# Patient Record
Sex: FEMALE | Race: WHITE | Employment: OTHER | URBAN - METROPOLITAN AREA
[De-identification: names, ages, dates, MRNs, and addresses within clinical notes are randomized per-mention and may not be internally consistent; named-entity substitution may affect disease eponyms.]

---

## 2017-08-15 ENCOUNTER — HOSPITAL ENCOUNTER (OUTPATIENT)
Dept: LAB | Age: 67
Discharge: HOME | End: 2017-08-15

## 2017-08-15 PROCEDURE — 88305 TISSUE EXAM BY PATHOLOGIST: CPT | Performed by: INTERNAL MEDICINE

## 2017-08-16 PROBLEM — K63.5 COLON POLYP: Status: ACTIVE | Noted: 2017-08-16

## 2017-08-16 PROBLEM — Z78.0 POSTMENOPAUSAL: Status: ACTIVE | Noted: 2017-08-16

## 2017-08-16 PROBLEM — K57.30 DIVERTICULOSIS OF LARGE INTESTINE: Status: ACTIVE | Noted: 2017-08-16

## 2018-08-21 PROBLEM — R01.1 SYSTOLIC MURMUR: Status: ACTIVE | Noted: 2018-08-21

## 2018-08-21 PROBLEM — D64.9 ANEMIA: Status: ACTIVE | Noted: 2018-08-21

## 2019-08-18 ENCOUNTER — HOSPITAL ENCOUNTER (OUTPATIENT)
Age: 69
Setting detail: OBSERVATION
Discharge: HOME | End: 2019-08-21
Attending: EMERGENCY MEDICINE | Admitting: INTERNAL MEDICINE
Payer: MEDICARE

## 2019-08-18 ENCOUNTER — APPOINTMENT (OUTPATIENT)
Dept: GENERAL RADIOLOGY | Age: 69
End: 2019-08-18
Attending: EMERGENCY MEDICINE
Payer: MEDICARE

## 2019-08-18 DIAGNOSIS — R06.00 DYSPNEA, UNSPECIFIED TYPE: ICD-10-CM

## 2019-08-18 DIAGNOSIS — R07.9 CHEST PAIN, UNSPECIFIED TYPE: Primary | ICD-10-CM

## 2019-08-18 LAB
ALBUMIN SERPL-MCNC: 3.4 G/DL (ref 3.2–4.6)
ALBUMIN/GLOB SERPL: 0.8 {RATIO} (ref 1.2–3.5)
ALP SERPL-CCNC: 120 U/L (ref 50–136)
ALT SERPL-CCNC: 15 U/L (ref 12–65)
ANION GAP SERPL CALC-SCNC: 8 MMOL/L (ref 7–16)
AST SERPL-CCNC: 8 U/L (ref 15–37)
BASOPHILS # BLD: 0.1 K/UL (ref 0–0.2)
BASOPHILS NFR BLD: 1 % (ref 0–2)
BILIRUB SERPL-MCNC: 0.4 MG/DL (ref 0.2–1.1)
BUN SERPL-MCNC: 15 MG/DL (ref 8–23)
CALCIUM SERPL-MCNC: 8.9 MG/DL (ref 8.3–10.4)
CHLORIDE SERPL-SCNC: 99 MMOL/L (ref 98–107)
CO2 SERPL-SCNC: 30 MMOL/L (ref 21–32)
CREAT SERPL-MCNC: 0.52 MG/DL (ref 0.6–1)
D DIMER PPP FEU-MCNC: 0.33 UG/ML(FEU)
DIFFERENTIAL METHOD BLD: ABNORMAL
EOSINOPHIL # BLD: 0.3 K/UL (ref 0–0.8)
EOSINOPHIL NFR BLD: 3 % (ref 0.5–7.8)
ERYTHROCYTE [DISTWIDTH] IN BLOOD BY AUTOMATED COUNT: 13.1 % (ref 11.9–14.6)
GLOBULIN SER CALC-MCNC: 4.2 G/DL (ref 2.3–3.5)
GLUCOSE SERPL-MCNC: 135 MG/DL (ref 65–100)
HCT VFR BLD AUTO: 34 % (ref 35.8–46.3)
HGB BLD-MCNC: 10.8 G/DL (ref 11.7–15.4)
IMM GRANULOCYTES # BLD AUTO: 0.1 K/UL (ref 0–0.5)
IMM GRANULOCYTES NFR BLD AUTO: 1 % (ref 0–5)
LYMPHOCYTES # BLD: 2 K/UL (ref 0.5–4.6)
LYMPHOCYTES NFR BLD: 21 % (ref 13–44)
MCH RBC QN AUTO: 29.4 PG (ref 26.1–32.9)
MCHC RBC AUTO-ENTMCNC: 31.8 G/DL (ref 31.4–35)
MCV RBC AUTO: 92.6 FL (ref 79.6–97.8)
MONOCYTES # BLD: 1.1 K/UL (ref 0.1–1.3)
MONOCYTES NFR BLD: 11 % (ref 4–12)
NEUTS SEG # BLD: 6.1 K/UL (ref 1.7–8.2)
NEUTS SEG NFR BLD: 64 % (ref 43–78)
NRBC # BLD: 0 K/UL (ref 0–0.2)
PLATELET # BLD AUTO: 339 K/UL (ref 150–450)
PMV BLD AUTO: 8.8 FL (ref 9.4–12.3)
POTASSIUM SERPL-SCNC: 3.9 MMOL/L (ref 3.5–5.1)
PROT SERPL-MCNC: 7.6 G/DL (ref 6.3–8.2)
RBC # BLD AUTO: 3.67 M/UL (ref 4.05–5.2)
SODIUM SERPL-SCNC: 137 MMOL/L (ref 136–145)
TROPONIN I SERPL-MCNC: <0.02 NG/ML (ref 0.02–0.05)
WBC # BLD AUTO: 9.5 K/UL (ref 4.3–11.1)

## 2019-08-18 PROCEDURE — 99285 EMERGENCY DEPT VISIT HI MDM: CPT | Performed by: EMERGENCY MEDICINE

## 2019-08-18 PROCEDURE — 93005 ELECTROCARDIOGRAM TRACING: CPT | Performed by: EMERGENCY MEDICINE

## 2019-08-18 PROCEDURE — 85379 FIBRIN DEGRADATION QUANT: CPT

## 2019-08-18 PROCEDURE — 85025 COMPLETE CBC W/AUTO DIFF WBC: CPT

## 2019-08-18 PROCEDURE — 80053 COMPREHEN METABOLIC PANEL: CPT

## 2019-08-18 PROCEDURE — 84484 ASSAY OF TROPONIN QUANT: CPT

## 2019-08-18 PROCEDURE — 71046 X-RAY EXAM CHEST 2 VIEWS: CPT

## 2019-08-19 ENCOUNTER — APPOINTMENT (OUTPATIENT)
Dept: ULTRASOUND IMAGING | Age: 69
End: 2019-08-19
Attending: NURSE PRACTITIONER
Payer: MEDICARE

## 2019-08-19 PROBLEM — R01.1 SYSTOLIC MURMUR: Chronic | Status: ACTIVE | Noted: 2018-08-21

## 2019-08-19 PROBLEM — R07.9 CHEST PAIN: Status: ACTIVE | Noted: 2019-08-19

## 2019-08-19 PROBLEM — D64.9 ANEMIA: Chronic | Status: ACTIVE | Noted: 2018-08-21

## 2019-08-19 PROBLEM — R53.83 FATIGUE: Status: ACTIVE | Noted: 2019-08-19

## 2019-08-19 LAB
ANION GAP SERPL CALC-SCNC: 6 MMOL/L (ref 7–16)
ATRIAL RATE: 83 BPM
BUN SERPL-MCNC: 13 MG/DL (ref 8–23)
CALCIUM SERPL-MCNC: 8.7 MG/DL (ref 8.3–10.4)
CALCULATED P AXIS, ECG09: 47 DEGREES
CALCULATED R AXIS, ECG10: -5 DEGREES
CALCULATED T AXIS, ECG11: 42 DEGREES
CHLORIDE SERPL-SCNC: 104 MMOL/L (ref 98–107)
CO2 SERPL-SCNC: 29 MMOL/L (ref 21–32)
CREAT SERPL-MCNC: 0.4 MG/DL (ref 0.6–1)
DIAGNOSIS, 93000: NORMAL
ERYTHROCYTE [DISTWIDTH] IN BLOOD BY AUTOMATED COUNT: 13.2 % (ref 11.9–14.6)
GLUCOSE SERPL-MCNC: 98 MG/DL (ref 65–100)
HCT VFR BLD AUTO: 29.7 % (ref 35.8–46.3)
HGB BLD-MCNC: 9.4 G/DL (ref 11.7–15.4)
MAGNESIUM SERPL-MCNC: 2.3 MG/DL (ref 1.8–2.4)
MCH RBC QN AUTO: 29.5 PG (ref 26.1–32.9)
MCHC RBC AUTO-ENTMCNC: 31.6 G/DL (ref 31.4–35)
MCV RBC AUTO: 93.1 FL (ref 79.6–97.8)
NRBC # BLD: 0 K/UL (ref 0–0.2)
P-R INTERVAL, ECG05: 154 MS
PLATELET # BLD AUTO: 282 K/UL (ref 150–450)
PMV BLD AUTO: 8.8 FL (ref 9.4–12.3)
POTASSIUM SERPL-SCNC: 3.8 MMOL/L (ref 3.5–5.1)
Q-T INTERVAL, ECG07: 400 MS
QRS DURATION, ECG06: 98 MS
QTC CALCULATION (BEZET), ECG08: 470 MS
RBC # BLD AUTO: 3.19 M/UL (ref 4.05–5.2)
SODIUM SERPL-SCNC: 139 MMOL/L (ref 136–145)
TROPONIN I SERPL-MCNC: <0.02 NG/ML (ref 0.02–0.05)
TROPONIN I SERPL-MCNC: <0.02 NG/ML (ref 0.02–0.05)
TSH SERPL DL<=0.005 MIU/L-ACNC: 3.43 UIU/ML (ref 0.36–3.74)
VENTRICULAR RATE, ECG03: 83 BPM
WBC # BLD AUTO: 6.6 K/UL (ref 4.3–11.1)

## 2019-08-19 PROCEDURE — 36415 COLL VENOUS BLD VENIPUNCTURE: CPT

## 2019-08-19 PROCEDURE — 93454 CORONARY ARTERY ANGIO S&I: CPT

## 2019-08-19 PROCEDURE — 74011250636 HC RX REV CODE- 250/636: Performed by: INTERNAL MEDICINE

## 2019-08-19 PROCEDURE — 93880 EXTRACRANIAL BILAT STUDY: CPT

## 2019-08-19 PROCEDURE — 80048 BASIC METABOLIC PNL TOTAL CA: CPT

## 2019-08-19 PROCEDURE — 74011636320 HC RX REV CODE- 636/320: Performed by: INTERNAL MEDICINE

## 2019-08-19 PROCEDURE — 93306 TTE W/DOPPLER COMPLETE: CPT

## 2019-08-19 PROCEDURE — 74011000250 HC RX REV CODE- 250: Performed by: INTERNAL MEDICINE

## 2019-08-19 PROCEDURE — 84443 ASSAY THYROID STIM HORMONE: CPT

## 2019-08-19 PROCEDURE — 94760 N-INVAS EAR/PLS OXIMETRY 1: CPT

## 2019-08-19 PROCEDURE — 99152 MOD SED SAME PHYS/QHP 5/>YRS: CPT

## 2019-08-19 PROCEDURE — 84484 ASSAY OF TROPONIN QUANT: CPT

## 2019-08-19 PROCEDURE — 74011250637 HC RX REV CODE- 250/637: Performed by: NURSE PRACTITIONER

## 2019-08-19 PROCEDURE — 99218 HC RM OBSERVATION: CPT

## 2019-08-19 PROCEDURE — 83735 ASSAY OF MAGNESIUM: CPT

## 2019-08-19 PROCEDURE — 74011250636 HC RX REV CODE- 250/636

## 2019-08-19 PROCEDURE — 85027 COMPLETE CBC AUTOMATED: CPT

## 2019-08-19 RX ORDER — CLOPIDOGREL BISULFATE 75 MG/1
75 TABLET ORAL DAILY
Status: DISCONTINUED | OUTPATIENT
Start: 2019-08-19 | End: 2019-08-19

## 2019-08-19 RX ORDER — FLUTICASONE PROPIONATE 50 MCG
2 SPRAY, SUSPENSION (ML) NASAL DAILY
Status: DISCONTINUED | OUTPATIENT
Start: 2019-08-19 | End: 2019-08-21 | Stop reason: HOSPADM

## 2019-08-19 RX ORDER — CITALOPRAM 20 MG/1
20 TABLET, FILM COATED ORAL DAILY
Status: DISCONTINUED | OUTPATIENT
Start: 2019-08-19 | End: 2019-08-20

## 2019-08-19 RX ORDER — HEPARIN SODIUM 200 [USP'U]/100ML
3 INJECTION, SOLUTION INTRAVENOUS CONTINUOUS
Status: DISCONTINUED | OUTPATIENT
Start: 2019-08-19 | End: 2019-08-20

## 2019-08-19 RX ORDER — METOPROLOL TARTRATE 25 MG/1
25 TABLET, FILM COATED ORAL 2 TIMES DAILY
Status: DISCONTINUED | OUTPATIENT
Start: 2019-08-19 | End: 2019-08-21 | Stop reason: HOSPADM

## 2019-08-19 RX ORDER — ASPIRIN 81 MG/1
81 TABLET ORAL DAILY
Status: DISCONTINUED | OUTPATIENT
Start: 2019-08-19 | End: 2019-08-21 | Stop reason: HOSPADM

## 2019-08-19 RX ORDER — PRAVASTATIN SODIUM 20 MG/1
40 TABLET ORAL
Status: DISCONTINUED | OUTPATIENT
Start: 2019-08-19 | End: 2019-08-19 | Stop reason: SDUPTHER

## 2019-08-19 RX ORDER — LIDOCAINE HYDROCHLORIDE 10 MG/ML
10-200 INJECTION INFILTRATION; PERINEURAL ONCE
Status: COMPLETED | OUTPATIENT
Start: 2019-08-19 | End: 2019-08-19

## 2019-08-19 RX ORDER — SODIUM CHLORIDE 0.9 % (FLUSH) 0.9 %
5-40 SYRINGE (ML) INJECTION EVERY 8 HOURS
Status: DISCONTINUED | OUTPATIENT
Start: 2019-08-19 | End: 2019-08-21 | Stop reason: HOSPADM

## 2019-08-19 RX ORDER — LANOLIN ALCOHOL/MO/W.PET/CERES
1 CREAM (GRAM) TOPICAL
Status: DISCONTINUED | OUTPATIENT
Start: 2019-08-19 | End: 2019-08-21 | Stop reason: HOSPADM

## 2019-08-19 RX ORDER — NITROGLYCERIN 0.4 MG/1
0.4 TABLET SUBLINGUAL
Status: DISCONTINUED | OUTPATIENT
Start: 2019-08-19 | End: 2019-08-21 | Stop reason: HOSPADM

## 2019-08-19 RX ORDER — AMLODIPINE BESYLATE 5 MG/1
5 TABLET ORAL DAILY
Status: DISCONTINUED | OUTPATIENT
Start: 2019-08-19 | End: 2019-08-21

## 2019-08-19 RX ORDER — MORPHINE SULFATE 2 MG/ML
2 INJECTION, SOLUTION INTRAMUSCULAR; INTRAVENOUS
Status: DISCONTINUED | OUTPATIENT
Start: 2019-08-19 | End: 2019-08-21 | Stop reason: HOSPADM

## 2019-08-19 RX ORDER — FENTANYL CITRATE 50 UG/ML
25-50 INJECTION, SOLUTION INTRAMUSCULAR; INTRAVENOUS
Status: DISCONTINUED | OUTPATIENT
Start: 2019-08-19 | End: 2019-08-21 | Stop reason: HOSPADM

## 2019-08-19 RX ORDER — ACETAMINOPHEN 325 MG/1
650 TABLET ORAL
Status: DISCONTINUED | OUTPATIENT
Start: 2019-08-19 | End: 2019-08-21 | Stop reason: HOSPADM

## 2019-08-19 RX ORDER — MAG HYDROX/ALUMINUM HYD/SIMETH 200-200-20
30 SUSPENSION, ORAL (FINAL DOSE FORM) ORAL
Status: DISCONTINUED | OUTPATIENT
Start: 2019-08-19 | End: 2019-08-21 | Stop reason: HOSPADM

## 2019-08-19 RX ORDER — ONDANSETRON 2 MG/ML
4 INJECTION INTRAMUSCULAR; INTRAVENOUS
Status: DISCONTINUED | OUTPATIENT
Start: 2019-08-19 | End: 2019-08-21 | Stop reason: HOSPADM

## 2019-08-19 RX ORDER — SODIUM CHLORIDE 0.9 % (FLUSH) 0.9 %
5-40 SYRINGE (ML) INJECTION AS NEEDED
Status: DISCONTINUED | OUTPATIENT
Start: 2019-08-19 | End: 2019-08-21 | Stop reason: HOSPADM

## 2019-08-19 RX ORDER — PRAVASTATIN SODIUM 40 MG/1
40 TABLET ORAL
Status: DISCONTINUED | OUTPATIENT
Start: 2019-08-19 | End: 2019-08-21 | Stop reason: HOSPADM

## 2019-08-19 RX ORDER — NALOXONE HYDROCHLORIDE 0.4 MG/ML
0.4 INJECTION, SOLUTION INTRAMUSCULAR; INTRAVENOUS; SUBCUTANEOUS AS NEEDED
Status: DISCONTINUED | OUTPATIENT
Start: 2019-08-19 | End: 2019-08-21 | Stop reason: HOSPADM

## 2019-08-19 RX ORDER — MIDAZOLAM HYDROCHLORIDE 1 MG/ML
.5-2 INJECTION, SOLUTION INTRAMUSCULAR; INTRAVENOUS
Status: DISCONTINUED | OUTPATIENT
Start: 2019-08-19 | End: 2019-08-20

## 2019-08-19 RX ORDER — LISINOPRIL 5 MG/1
10 TABLET ORAL DAILY
Status: DISCONTINUED | OUTPATIENT
Start: 2019-08-19 | End: 2019-08-21 | Stop reason: HOSPADM

## 2019-08-19 RX ADMIN — IOPAMIDOL 40 ML: 755 INJECTION, SOLUTION INTRAVENOUS at 19:04

## 2019-08-19 RX ADMIN — METOPROLOL TARTRATE 25 MG: 25 TABLET ORAL at 17:20

## 2019-08-19 RX ADMIN — CLOPIDOGREL BISULFATE 75 MG: 75 TABLET ORAL at 09:04

## 2019-08-19 RX ADMIN — MIDAZOLAM HYDROCHLORIDE 2 MG: 1 INJECTION, SOLUTION INTRAMUSCULAR; INTRAVENOUS at 18:51

## 2019-08-19 RX ADMIN — HEPARIN SODIUM 3 ML/HR: 5000 INJECTION, SOLUTION INTRAVENOUS; SUBCUTANEOUS at 18:43

## 2019-08-19 RX ADMIN — METOPROLOL TARTRATE 25 MG: 25 TABLET ORAL at 09:04

## 2019-08-19 RX ADMIN — Medication 10 ML: at 01:12

## 2019-08-19 RX ADMIN — CITALOPRAM HYDROBROMIDE 20 MG: 20 TABLET ORAL at 09:19

## 2019-08-19 RX ADMIN — FENTANYL CITRATE 50 MCG: 50 INJECTION, SOLUTION INTRAMUSCULAR; INTRAVENOUS at 18:51

## 2019-08-19 RX ADMIN — LISINOPRIL 10 MG: 5 TABLET ORAL at 09:04

## 2019-08-19 RX ADMIN — PRAVASTATIN SODIUM 40 MG: 40 TABLET ORAL at 22:39

## 2019-08-19 RX ADMIN — Medication 5 ML: at 09:07

## 2019-08-19 RX ADMIN — Medication 10 ML: at 22:39

## 2019-08-19 RX ADMIN — NITROGLYCERIN 1 INCH: 20 OINTMENT TOPICAL at 01:12

## 2019-08-19 RX ADMIN — FLUTICASONE PROPIONATE 2 SPRAY: 50 SPRAY, METERED NASAL at 09:03

## 2019-08-19 RX ADMIN — HEPARIN SODIUM 2 ML: 10000 INJECTION INTRAVENOUS; SUBCUTANEOUS at 18:51

## 2019-08-19 RX ADMIN — LIDOCAINE HYDROCHLORIDE 2 ML: 10 INJECTION, SOLUTION INFILTRATION; PERINEURAL at 18:51

## 2019-08-19 RX ADMIN — AMLODIPINE BESYLATE 5 MG: 5 TABLET ORAL at 09:04

## 2019-08-19 RX ADMIN — ASPIRIN 81 MG: 81 TABLET ORAL at 09:04

## 2019-08-19 RX ADMIN — Medication 10 ML: at 06:42

## 2019-08-19 NOTE — PROGRESS NOTES
Initial visit to assess pt's spiritual needs. Feeling today? Nervous, anxious  Pt is waiting for a procedure in cath lab    Receiving good care?  yes    Needs from Spiritual Care:  prayer    Ministry of presence & prayer to demonstrate caring & concern, convey emotional & spiritual support.     jules Santos MDiv,[facility],PhD

## 2019-08-19 NOTE — PROGRESS NOTES
Bedside and Verbal shift change report given to Britton Sanchez RN (oncoming nurse) by self Milvia mike).  Report included the following information SBAR, Kardex, Intake/Output, MAR, Recent Results and Cardiac Rhythm Sr.

## 2019-08-19 NOTE — ED TRIAGE NOTES
Pt arrives from home with  via POV with c/o chest pain x 4 days. Pt has hx of stent in 2014. Pt states pressure can get to a level 4/10 on exertion, lessens to 0/10 with rest. Pt states she has been \"run down\" lately. Pain is intermittent, no pain in triage, but has been getting worse over time. Pt takes BP medication, states compliant with regimen, and has not taken her nitro with this pain. Diogo NVD or diaphoresis.

## 2019-08-19 NOTE — PROGRESS NOTES
Verbal bedside report given to Danna fields onclinda RN. Patient's situation, background, assessment and recommendations provided. Kardex, Mar, and recent results also reviewed. Opportunity for questions provided. Oncoming RN assumed care of patient.      Pt in the cath lab

## 2019-08-19 NOTE — PROGRESS NOTES
Pt admitted at Observation status. Pt instructed on home medication policy; pt voices understanding. Pt provided copies of the following: Admission pamphlet with Observation insert and Medicare FAQ's. Home meds ordered per MD, verified with [facility] and supplied by patient. No narcotic meds. Medications verified and labeled per pharmacy and placed in locked box in patient's room. The medications received from the patient include plavix, asa, norvasc, celexa, metoprolol, pravastatin,. The following medications were not ordered include lisinopril hctz (placed in box).

## 2019-08-19 NOTE — PROGRESS NOTES
TRANSFER - IN REPORT:    Verbal report received from Alesha Elena RN(name) on Classie Orf  being received from ER(unit) for routine progression of care      Report consisted of patients Situation, Background, Assessment and   Recommendations(SBAR). Information from the following report(s) SBAR, Kardex, ED Summary, Intake/Output, MAR, Recent Results and Cardiac Rhythm SR was reviewed with the receiving nurse. Opportunity for questions and clarification was provided. Assessment completed upon patients arrival to unit and care assumed. Dual skin assessment completed on admission. Sacrum visualized with no issues. Heels are intact. No skin issues noted. Patient reports \"tan won't come off legs. It is a sun tan. \"

## 2019-08-19 NOTE — PROGRESS NOTES
TRANSFER - OUT REPORT:    Verbal report given to RN on Emily Saldana  being transferred to [facility] for routine progression of care       Report consisted of patients Situation, Background, Assessment and Recommendations(SBAR). Information from the following report(s) SBAR, Kardex, Procedure Summary and MAR was reviewed with the receiving nurse. Opportunity for questions and clarification was provided.       [facility] with Dr Jack  No intervention  2 versed  50 fentanyl  Right radial Rband 14ml at [facility]

## 2019-08-19 NOTE — ED NOTES
Patient report to Justina Lezama [facility]. Patient care to be assumed at this time.

## 2019-08-19 NOTE — ED NOTES
TRANSFER - OUT REPORT:    Verbal report given to [facility] on Maki Eng  being transferred to [facility] Telemetry(unit) for routine progression of care       Report consisted of patients Situation, Background, Assessment and   Recommendations(SBAR). Information from the following report(s) SBAR, Kardex, ED Summary, MAR, Recent Results and Cardiac Rhythm NSR was reviewed with the receiving nurse. Lines:   Peripheral IV 08/18/19 Left Antecubital (Active)   Site Assessment Clean, dry, & intact 8/18/2019  8:50 PM   Phlebitis Assessment 0 8/18/2019  8:50 PM   Infiltration Assessment 0 8/18/2019  8:50 PM   Dressing Status Clean, dry, & intact 8/18/2019  8:50 PM   Dressing Type Transparent 8/18/2019  8:50 PM   Hub Color/Line Status Green 8/18/2019  8:50 PM        Opportunity for questions and clarification was provided.       Patient transported with:   Monitor  Registered Nurse

## 2019-08-19 NOTE — PROGRESS NOTES
TRANSFER - IN REPORT:    Verbal report received from ARABELLA Bar on Roque Servin being received from [address] [place] [address] for routine post - op      Report consisted of patients Situation, Background, Assessment and Recommendations(SBAR). Information from the following report(s) Procedure Summary was reviewed with the receiving nurse. Opportunity for questions and clarification was provided.

## 2019-08-19 NOTE — PROGRESS NOTES
Care Management Interventions  PCP Verified by CM: Yes  Last Visit to PCP: 02/26/19  Mode of Transport at Discharge: Other (see comment)(Medhat Tejeda Spouse [phone] )  Transition of Care Consult (CM Consult): Discharge Planning  Discharge Durable Medical Equipment: No  Physical Therapy Consult: No  Occupational Therapy Consult: No  Speech Therapy Consult: No  Current Support Network: Lives with Spouse  Confirm Follow Up Transport: Family  Plan discussed with Pt/Family/Caregiver: Yes  Freedom of Choice Offered: Yes  Discharge Location  Discharge Placement: Home      Pt admitted to 3rd floor [facility] for CP . CM met with pt to discuss CM needs & DCP. Pt is A&Ox4. Pt is indep at home with all ADLS. Pt lives with spouse. Pt has no DME needs. Pt has no difficulty with obtaining medications or transport. DCP home with spouse. No further needs noted.

## 2019-08-19 NOTE — PROGRESS NOTES
Verbal bedside report received from [address]. Patient's situation, background, assessment and recommendations were provided. Kardex, Mar, and recent results also reviewed. Opportunity for questions provided. Assumed care of patient.

## 2019-08-19 NOTE — ED PROVIDER NOTES
55-year-old lady presents with concerns about pain in her chest that has gradually gotten worse over the past couple weeks. With it is progressive dyspnea on exertion. Patient says that she has had a history of a previous cardiac stent. She denies any history of pulmonary embolism or congestive heart failure. She said she does know she has a loud murmur. She is due to follow-up with cardiology this coming week for further evaluation of her symptoms. She notes that her pain gets worse with exertion and she said even going up a small incline gives her pain and dyspnea. She denies any specific fevers or chills. Elements of this note were created using speech recognition software. As such, errors of speech recognition may be present.            Past Medical History:   Diagnosis Date    Abnormal weight gain     Acute bronchitis     Acute, but ill-defined, cerebrovascular disease 4/20/2015    Adjustment disorder with depressed mood     Allergic rhinitis, cause unspecified 4/21/2015    Asthma     Coronary atherosclerosis of unspecified type of vessel, native or graft 4/20/2015    Coronary atherosclerosis of unspecified type of vessel, native or graft     Coronary atherosclerosis of unspecified type of vessel, native or graft     Depressive disorder, not elsewhere classified 4/20/2015    Disorder of bone and cartilage, unspecified 4/21/2015    Encounter for long-term (current) use of other medications     Herpes zoster without mention of complication     Hypertension     Mixed incontinence urge and stress (male)(female) 4/21/2015    Other and unspecified hyperlipidemia     Overweight(278.02)     Pleurisy without mention of effusion or current tuberculosis     Premature menopause     Psychiatric disorder     MENOPAUSE    Rheumatoid arthritis(714.0)     Stroke ([facility] [address]     AT AGE 35    Unspecified asthma(493.90) 4/21/2015    Unspecified disorder of skin and subcutaneous tissue     Unspecified menopausal and postmenopausal disorder        Past Surgical History:   Procedure Laterality Date    ABDOMEN SURGERY PROC UNLISTED      GALLBLADDER    HX CHOLECYSTECTOMY  1988    HX CORONARY STENT PLACEMENT      HX GYN  1998    HYSTERECTOMY    HX MOHS PROCEDURES  2005    HX ORTHOPAEDIC      SHOULDER    HX UROLOGICAL  2008    BLADDER TACK    VASCULAR SURGERY PROCEDURE UNLIST      VEIN IN R LEG         Family History:   Problem Relation Age of Onset    Diabetes Mother     Glaucoma Mother     Heart Disease Mother     Alcohol abuse Father     Cancer Sister         pancreatic cancer    Diabetes Sister     Hypertension Sister     Heart Disease Sister     Diabetes Maternal Grandmother     Diabetes Paternal Grandmother        Social History     Socioeconomic History    Marital status:      Spouse name: Not on file    Number of children: Not on file    Years of education: Not on file    Highest education level: Not on file   Occupational History    Not on file   Social Needs    Financial resource strain: Not on file    Food insecurity:     Worry: Not on file     Inability: Not on file    Transportation needs:     Medical: Not on file     Non-medical: Not on file   Tobacco Use    Smoking status: Former Smoker     Last attempt to quit: 2001     Years since quittin.7    Smokeless tobacco: Never Used   Substance and Sexual Activity    Alcohol use:  Yes     Alcohol/week: 3.3 standard drinks     Types: 3 Cans of beer, 1 Standard drinks or equivalent per week     Comment: occasionally    Drug use: No    Sexual activity: Not on file   Lifestyle    Physical activity:     Days per week: Not on file     Minutes per session: Not on file    Stress: Not on file   Relationships    Social connections:     Talks on phone: Not on file     Gets together: Not on file     Attends Hoahaoism service: Not on file     Active member of club or organization: Not on file     Attends meetings of clubs or organizations: Not on file     Relationship status: Not on file    Intimate partner violence:     Fear of current or ex partner: Not on file     Emotionally abused: Not on file     Physically abused: Not on file     Forced sexual activity: Not on file   Other Topics Concern    Not on file   Social History Narrative    Not on file         ALLERGIES: Other medication and Sulfa (sulfonamide antibiotics)    Review of Systems   Constitutional: Negative for chills, diaphoresis and fever. HENT: Negative for congestion, rhinorrhea and sore throat. Eyes: Negative for redness and visual disturbance. Respiratory: Positive for chest tightness and shortness of breath. Negative for cough and wheezing. Cardiovascular: Negative for chest pain and palpitations. Gastrointestinal: Negative for abdominal pain, blood in stool, diarrhea, nausea and vomiting. Endocrine: Negative for polydipsia and polyuria. Genitourinary: Negative for dysuria and hematuria. Musculoskeletal: Negative for arthralgias, myalgias and neck stiffness. Skin: Negative for rash. Allergic/Immunologic: Negative for environmental allergies and food allergies. Neurological: Negative for dizziness, weakness and headaches. Hematological: Negative for adenopathy. Does not bruise/bleed easily. Psychiatric/Behavioral: Negative for confusion and sleep disturbance. The patient is not nervous/anxious. Vitals:    08/18/19 2102 08/18/19 2231 08/18/19 2234 08/18/19 2234   BP: 110/55  109/56    Pulse: 73  71    Resp: 20      Temp:       SpO2: 93% 93% 93% 96%   Weight:       Height:                Physical Exam   Constitutional: She is oriented to person, place, and time. She appears well-developed and well-nourished. HENT:   Head: Normocephalic and atraumatic. Eyes: Pupils are equal, round, and reactive to light. Right eye exhibits no discharge. Left eye exhibits no discharge. Cardiovascular: Normal rate and regular rhythm. 3 out of 6 holosystolic murmur   Pulmonary/Chest: Effort normal and breath sounds normal.   Abdominal: Soft. Bowel sounds are normal.   Musculoskeletal: Normal range of motion. She exhibits no deformity. Neurological: She is alert and oriented to person, place, and time. Nursing note and vitals reviewed. MDM  Number of Diagnoses or Management Options  Diagnosis management comments: Given her symptoms I will check a d-dimer to screen for potential pulmonary embolism. Her initial troponin and EKG are unremarkable. D-dimer is negative. I will discuss the case with on-call for cardiology. 11:27 PM  Given her exertional chest pain symptoms and her dyspnea with a negative d-dimer, I discussed the case with on-call for cardiology who kindly agreed to have his service see the patient.          Procedures

## 2019-08-19 NOTE — H&P
[facility] CARDIOLOGY History &Physical                 Primary Cardiologist: Dr. Ashwini Ball    Primary Care Physician: Dr. Graciela Holt    Admitting Physician: Dr. Latrice Holbrook:     Patient is a 76 y.o.  female with PMHx of CAD (s/p PCI mLAD x1, 2009), HTN, HLD, AS, Anemia, Lymphoma (no tx), Asthma and Depression who presented to the ED tonight with c/o CP. Has been having intermittent episodes of mid chest pressure for a couple months. Seems to be occurring more frequently now. States occurs with exertion and resolves with rest. Reports has noted having sweats with the episodes. Denies associated HA, dizziness, palpitations, syncope, N/V, edema.  has also had more SNOWDEN and fatigue. Even walking a very short distance causes her to have significant dyspnea. States she just feels \"weak. \" Reports taking all meds as directed. No recent illness, F/C/S.  had f/u with Oncology re lymphoma 5/2019 and not treatment needed as her counts are good and she is asymptomatic.  has not had LHC since stent placed in 2009. Has an ECHO pending in the office but not done yet. [facility] has appt next week to see Dr. Mehreen Yeung.       Past Medical History:   Diagnosis Date    Abnormal weight gain     Acute bronchitis     Acute, but ill-defined, cerebrovascular disease 4/20/2015    Adjustment disorder with depressed mood     Allergic rhinitis, cause unspecified 4/21/2015    Asthma     Coronary atherosclerosis of unspecified type of vessel, native or graft 4/20/2015    Coronary atherosclerosis of unspecified type of vessel, native or graft     Coronary atherosclerosis of unspecified type of vessel, native or graft     Depressive disorder, not elsewhere classified 4/20/2015    Disorder of bone and cartilage, unspecified 4/21/2015    Encounter for long-term (current) use of other medications     Herpes zoster without mention of complication     Hypertension     Mixed incontinence urge and stress (male)(female) 2015    Other and unspecified hyperlipidemia     Overweight(278.02)     Pleurisy without mention of effusion or current tuberculosis     Premature menopause     Psychiatric disorder     MENOPAUSE    Rheumatoid arthritis(714.0)     Stroke ([facility] [address]     AT AGE 35    Unspecified asthma(493.90) 2015    Unspecified disorder of skin and subcutaneous tissue     Unspecified menopausal and postmenopausal disorder       Past Surgical History:   Procedure Laterality Date    ABDOMEN SURGERY PROC UNLISTED      GALLBLADDER    HX CHOLECYSTECTOMY      HX CORONARY STENT PLACEMENT      HX GYN  1998    HYSTERECTOMY    HX MOHS PROCEDURES      HX ORTHOPAEDIC      SHOULDER    HX UROLOGICAL  2008    BLADDER TACK    VASCULAR SURGERY PROCEDURE UNLIST      VEIN IN R LEG      Allergies   Allergen Reactions    Other Medication Rash     TAPE,  USE PAPER TAPE    Sulfa (Sulfonamide Antibiotics) Nausea Only     Social History     Tobacco Use    Smoking status: Former Smoker     Last attempt to quit: 2001     Years since quittin.7    Smokeless tobacco: Never Used   Substance Use Topics    Alcohol use:  Yes     Alcohol/week: 3.3 standard drinks     Types: 3 Cans of beer, 1 Standard drinks or equivalent per week     Comment: occasionally      FH:   Family History   Problem Relation Age of Onset    Diabetes Mother     Glaucoma Mother     Heart Disease Mother     Alcohol abuse Father     Cancer Sister         pancreatic cancer    Diabetes Sister     Hypertension Sister     Heart Disease Sister     Diabetes Maternal Grandmother     Diabetes Paternal Grandmother         Review of Systems  General: no weight change, +weakness, no fever or chills  Skin: no rashes, lumps, or other skin changes  HEENT: no headache, dizziness, lightheadedness, vision changes, hearing changes, tinnitus, vertigo, sinus pressure/pain, bleeding gums, sore throat, or hoarseness  Neck: no swollen glands, goiter, pain or stiffness  Respiratory: no cough, sputum, hemoptysis, +dyspnea, no wheezing  Cardiovascular: + as per HPI  Gastrointestinal: no reflux, constipation, diarrhea, liver problems, GI bleeding  Urinary: no frequency, urgency , hematuria, burning/pain with urination, recent flank pain, polyuria, nocturia, or difficulty urinating  Peripheral Vascular: no claudication, leg cramps, prior DVTs, swelling of calves, legs, or feet, color change, or swelling with redness or tenderness  Musculoskeletal: no muscle or joint pain/stiffness, joint swelling, erythema of joints, or back pain  Psychiatric: +depression, no excessive stress  Neurological: no sensory or motor loss, seizures, syncope, tremors, numbness, tingling, no changes in mood, attention, or speech, no changes in orientation, memory, insight, or judgment. Hematologic: no anemia, easy bruising or bleeding  Endocrine: no thyroid problems, no heat or cold intolerance, excessive sweating, polyuria, polydipsia, no diabetes. Objective:       Visit Vitals  /53   Pulse 73   Temp 98.3 °F (36.8 °C)   Resp 18   Ht 4' 11.25\" (1.505 m)   Wt 63.5 kg (140 lb)   SpO2 95%   BMI 28.04 kg/m²       No intake/output data recorded. No intake/output data recorded.     Physical Exam:  General: well developed, well nourished, NAD  HEENT: pupils equal and round, no abnormalities noted  Neck: supple, no JVD, + bilat carotid bruits  Heart: S1S2 with RRR, holosystolic murmur, no gallops  Lungs: clear throughout auscultation bilaterally without adventitious sounds, normal effort, on O2  Abd: soft, nontender, nondistended, with good bowel sounds  Ext: warm, no edema, calves supple/nontender, pulses 2+ bilaterally  Skin: warm and dry  Psychiatric: Normal mood and affect  Neurologic: Alert and oriented X 3, CNs intact, moves all 4      ECG: SR 83, NSST changes, no acute DIANNE    Data Review:      Recent Results (from the past 24 hour(s))   CBC WITH AUTOMATED DIFF    Collection Time: 08/18/19  8:52 PM   Result Value Ref Range    WBC 9.5 4.3 - 11.1 K/uL    RBC 3.67 (L) 4.05 - 5.2 M/uL    HGB 10.8 (L) 11.7 - 15.4 g/dL    HCT 34.0 (L) 35.8 - 46.3 %    MCV 92.6 79.6 - 97.8 FL    MCH 29.4 26.1 - 32.9 PG    MCHC 31.8 31.4 - 35.0 g/dL    RDW 13.1 11.9 - 14.6 %    PLATELET 697 326 - 917 K/uL    MPV 8.8 (L) 9.4 - 12.3 FL    ABSOLUTE NRBC 0.00 0.0 - 0.2 K/uL    DF AUTOMATED      NEUTROPHILS 64 43 - 78 %    LYMPHOCYTES 21 13 - 44 %    MONOCYTES 11 4.0 - 12.0 %    EOSINOPHILS 3 0.5 - 7.8 %    BASOPHILS 1 0.0 - 2.0 %    IMMATURE GRANULOCYTES 1 0.0 - 5.0 %    ABS. NEUTROPHILS 6.1 1.7 - 8.2 K/UL    ABS. LYMPHOCYTES 2.0 0.5 - 4.6 K/UL    ABS. MONOCYTES 1.1 0.1 - 1.3 K/UL    ABS. EOSINOPHILS 0.3 0.0 - 0.8 K/UL    ABS. BASOPHILS 0.1 0.0 - 0.2 K/UL    ABS. IMM. GRANS. 0.1 0.0 - 0.5 K/UL   METABOLIC PANEL, COMPREHENSIVE    Collection Time: 08/18/19  8:52 PM   Result Value Ref Range    Sodium 137 136 - 145 mmol/L    Potassium 3.9 3.5 - 5.1 mmol/L    Chloride 99 98 - 107 mmol/L    CO2 30 21 - 32 mmol/L    Anion gap 8 7 - 16 mmol/L    Glucose 135 (H) 65 - 100 mg/dL    BUN 15 8 - 23 MG/DL    Creatinine 0.52 (L) 0.6 - 1.0 MG/DL    GFR est AA >60 >60 ml/min/1.73m2    GFR est non-AA >60 >60 ml/min/1.73m2    Calcium 8.9 8.3 - 10.4 MG/DL    Bilirubin, total 0.4 0.2 - 1.1 MG/DL    ALT (SGPT) 15 12 - 65 U/L    AST (SGOT) 8 (L) 15 - 37 U/L    Alk.  phosphatase 120 50 - 136 U/L    Protein, total 7.6 6.3 - 8.2 g/dL    Albumin 3.4 3.2 - 4.6 g/dL    Globulin 4.2 (H) 2.3 - 3.5 g/dL    A-G Ratio 0.8 (L) 1.2 - 3.5     TROPONIN I    Collection Time: 08/18/19  8:52 PM   Result Value Ref Range    Troponin-I, Qt. <0.02 (L) 0.02 - 0.05 NG/ML   D DIMER    Collection Time: 08/18/19  8:52 PM   Result Value Ref Range    D DIMER 0.33 <0.56 ug/ml(FEU)       CXR: (-) acute pathology    Assessment/Plan:   Principal Problem:    Chest pain -- admit to tele, serial Soraya, cont ASA + topical NTG, consider heparin for recurrent CP or elevated Soraya, update ECHO in AM, NPO for poss [facility]    Active Problems:    Essential hypertension, benign -- cont home meds, adjust as indicated      Coronary atherosclerosis of native coronary vessel -- s/p PCI 2009, no LHC since, cont ASA, Plavix, BB, ACE and statin therapy      Depression -- cont home meds      Lymphoma -- followed by Oncology at [facility], good report 5/2019      Dyslipidemia -- check lipids, cont statin      Bruit (arterial) -- bilat carotid, check US to further evaluate severity      Systolic murmur -- ? worsening AS causing vs contributing to s/s, check ECHO      Fatigue -- 2/2 progressive CAD vs worsening AS, see plan as above, check TSH for completeness      Anemia -- stable on admit, f/u serial labs              PATRICIO Kirk  8/19/2019  12:06 AM

## 2019-08-20 LAB
ANION GAP SERPL CALC-SCNC: 9 MMOL/L (ref 7–16)
BUN SERPL-MCNC: 10 MG/DL (ref 8–23)
CALCIUM SERPL-MCNC: 8.4 MG/DL (ref 8.3–10.4)
CHLORIDE SERPL-SCNC: 105 MMOL/L (ref 98–107)
CHOLEST SERPL-MCNC: 83 MG/DL
CO2 SERPL-SCNC: 26 MMOL/L (ref 21–32)
CREAT SERPL-MCNC: 0.38 MG/DL (ref 0.6–1)
ERYTHROCYTE [DISTWIDTH] IN BLOOD BY AUTOMATED COUNT: 13.3 % (ref 11.9–14.6)
GLUCOSE SERPL-MCNC: 84 MG/DL (ref 65–100)
HCT VFR BLD AUTO: 31.9 % (ref 35.8–46.3)
HDLC SERPL-MCNC: 33 MG/DL (ref 40–60)
HDLC SERPL: 2.5 {RATIO}
HGB BLD-MCNC: 9.8 G/DL (ref 11.7–15.4)
LDLC SERPL CALC-MCNC: 34.8 MG/DL
LIPID PROFILE,FLP: ABNORMAL
MAGNESIUM SERPL-MCNC: 2.3 MG/DL (ref 1.8–2.4)
MCH RBC QN AUTO: 29.1 PG (ref 26.1–32.9)
MCHC RBC AUTO-ENTMCNC: 30.7 G/DL (ref 31.4–35)
MCV RBC AUTO: 94.7 FL (ref 79.6–97.8)
NRBC # BLD: 0 K/UL (ref 0–0.2)
PLATELET # BLD AUTO: 279 K/UL (ref 150–450)
PMV BLD AUTO: 9 FL (ref 9.4–12.3)
POTASSIUM SERPL-SCNC: 3.5 MMOL/L (ref 3.5–5.1)
RBC # BLD AUTO: 3.37 M/UL (ref 4.05–5.2)
SODIUM SERPL-SCNC: 140 MMOL/L (ref 136–145)
TRIGL SERPL-MCNC: 76 MG/DL (ref 35–150)
VLDLC SERPL CALC-MCNC: 15.2 MG/DL (ref 6–23)
WBC # BLD AUTO: 7.4 K/UL (ref 4.3–11.1)

## 2019-08-20 PROCEDURE — 80061 LIPID PANEL: CPT

## 2019-08-20 PROCEDURE — 99218 HC RM OBSERVATION: CPT

## 2019-08-20 PROCEDURE — 80048 BASIC METABOLIC PNL TOTAL CA: CPT

## 2019-08-20 PROCEDURE — 74011250636 HC RX REV CODE- 250/636

## 2019-08-20 PROCEDURE — 85027 COMPLETE CBC AUTOMATED: CPT

## 2019-08-20 PROCEDURE — 93312 ECHO TRANSESOPHAGEAL: CPT

## 2019-08-20 PROCEDURE — 83735 ASSAY OF MAGNESIUM: CPT

## 2019-08-20 PROCEDURE — 36415 COLL VENOUS BLD VENIPUNCTURE: CPT

## 2019-08-20 PROCEDURE — 74011250637 HC RX REV CODE- 250/637: Performed by: NURSE PRACTITIONER

## 2019-08-20 PROCEDURE — 99152 MOD SED SAME PHYS/QHP 5/>YRS: CPT

## 2019-08-20 RX ORDER — CITALOPRAM 20 MG/1
20 TABLET, FILM COATED ORAL
Status: DISCONTINUED | OUTPATIENT
Start: 2019-08-20 | End: 2019-08-21 | Stop reason: HOSPADM

## 2019-08-20 RX ORDER — MIDAZOLAM HYDROCHLORIDE 1 MG/ML
.5-2 INJECTION, SOLUTION INTRAMUSCULAR; INTRAVENOUS
Status: DISCONTINUED | OUTPATIENT
Start: 2019-08-20 | End: 2019-08-21 | Stop reason: HOSPADM

## 2019-08-20 RX ADMIN — ASPIRIN 81 MG: 81 TABLET ORAL at 08:36

## 2019-08-20 RX ADMIN — Medication 5 ML: at 21:19

## 2019-08-20 RX ADMIN — CITALOPRAM 20 MG: 20 TABLET, FILM COATED ORAL at 21:19

## 2019-08-20 RX ADMIN — LISINOPRIL 10 MG: 5 TABLET ORAL at 08:35

## 2019-08-20 RX ADMIN — METOPROLOL TARTRATE 25 MG: 25 TABLET ORAL at 17:22

## 2019-08-20 RX ADMIN — AMLODIPINE BESYLATE 5 MG: 5 TABLET ORAL at 08:36

## 2019-08-20 RX ADMIN — PRAVASTATIN SODIUM 40 MG: 40 TABLET ORAL at 21:19

## 2019-08-20 RX ADMIN — Medication 10 ML: at 05:45

## 2019-08-20 RX ADMIN — MIDAZOLAM HYDROCHLORIDE 2 MG: 1 INJECTION, SOLUTION INTRAMUSCULAR; INTRAVENOUS at 15:33

## 2019-08-20 RX ADMIN — FENTANYL CITRATE 50 MCG: 50 INJECTION, SOLUTION INTRAMUSCULAR; INTRAVENOUS at 15:26

## 2019-08-20 RX ADMIN — MIDAZOLAM HYDROCHLORIDE 2 MG: 1 INJECTION, SOLUTION INTRAMUSCULAR; INTRAVENOUS at 15:26

## 2019-08-20 RX ADMIN — FENTANYL CITRATE 50 MCG: 50 INJECTION, SOLUTION INTRAMUSCULAR; INTRAVENOUS at 15:33

## 2019-08-20 RX ADMIN — METOPROLOL TARTRATE 25 MG: 25 TABLET ORAL at 08:36

## 2019-08-20 NOTE — PROGRESS NOTES
Bedside shift report received from Marino Prieto RN     Pt just returned from the cath lab. R radial TR band CDI. No bleeding or hematoma present. Pt placed on eagle with BP cycling every 15 minutes.

## 2019-08-20 NOTE — PROGRESS NOTES
[facility] CARDIOLOGY PROGRESS NOTE           8/20/2019 1:09 PM    Admit Date: 8/18/2019      Subjective:   Patient with significant dyspnea on exertion. Severe/critical AS on exam and echo. Cardiac catheterization with stable CAD. ROS:  Cardiovascular:  As noted above    Objective:      Vitals:    08/20/19 0409 08/20/19 0444 08/20/19 0851 08/20/19 1225   BP:  105/45 117/71 103/63   Pulse:  81 93 74   Resp:  18 18 18   Temp:  98.9 °F (37.2 °C) 98.3 °F (36.8 °C) 97.3 °F (36.3 °C)   SpO2:  98% 95% 98%   Weight: 65.4 kg (144 lb 3.2 oz)      Height:           Physical Exam:  General-No Acute Distress  Neck- supple, no JVD  CV- regular rate and rhythm no MRG  Lung- clear bilaterally  Abd- soft, nontender, nondistended  Ext- no edema bilaterally. Skin- warm and dry    Data Review:   Recent Labs     08/20/19  0359 08/19/19  1217 08/19/19  0349     --  139   K 3.5  --  3.8   MG 2.3  --  2.3   BUN 10  --  13   CREA 0.38*  --  0.40*   GLU 84  --  98   WBC 7.4  --  6.6   HGB 9.8*  --  9.4*   HCT 31.9*  --  29.7*     --  282   TROIQ  --  <0.02* <0.02*   CHOL 83  --   --    LDLC 34.8  --   --    HDL 33*  --   --        Assessment/Plan:     Principal Problem:    Chest pain (8/19/2019)    Likely multifactorial but moderate CAD and critical AS. Plan BAM today to better assess if bicuspid.       Active Problems:    Essential hypertension, benign (3/12/2009)    This is well controlled       Coronary atherosclerosis of native coronary vessel (3/12/2009)    Stable by cardiac catheterization       Depression (4/20/2015)    Moderate       Lymphoma (HCC) (2/2/2016)    Chronic low grade       Dyslipidemia (10/18/2016)    On pravastatin       Bruit (arterial) (10/18/2016)    Stable carotid disease       Anemia (8/21/2018)    Chronic and stable       Systolic murmur (3/87/1483)    Due to AS      Fatigue (8/19/2019)    Due to Katty Jamison MD  8/20/2019 1:09 PM

## 2019-08-20 NOTE — PROGRESS NOTES
Radial compression band removed at 2215 after slowly reducing air from 14 cc to zero as per hospital protocol. No bleeding or hematoma noted. 2 x 2 gauze with tegaderm placed over puncture site. The affected extremity is warm and dry to the touch. Frequent vital signs printed and placed on bedside chart. Patient instructed to call if any bleeding noted on gauze. Patient verbalized understanding the nursing instructions. Vitals placed in the patients chart.

## 2019-08-20 NOTE — PROGRESS NOTES
TRANSFER - OUT REPORT:    Verbal report given to ARABELLA Bar on Zena Garcia  being transferred to [address] [place] [address] for routine progression of care       Report consisted of patients Situation, Background, Assessment and Recommendations(SBAR). Information from the following report(s) SBAR, Kardex, Procedure Summary and MAR was reviewed with the receiving nurse. Opportunity for questions and clarification was provided.       BAM with Dr Manpreet Bush   4 versed  100 fentanyl

## 2019-08-20 NOTE — PROGRESS NOTES
Verbal bedside report given to Dipika LEAVITT, oncoming RN. Patient's situation, background, assessment and recommendations provided. Opportunity for questions provided. Oncoming RN assumed care of patient. Right radial site visualized.

## 2019-08-20 NOTE — PROCEDURES
[facility]  CARDIAC CATH    Name:  Mitchel Knight  MR#:  [id number]  :  1950  ACCOUNT #:  [de-identified]  DATE OF SERVICE:  2019      PREPROCEDURE DIAGNOSIS:  Aortic stenosis. POSTPROCEDURE DIAGNOSIS:  Aortic stenosis. PROCEDURE PERFORMED:  Coronary angiography. SURGEON:  Maggy Sam MD     ASSISTANT:  None. ANESTHESIA:  The patient received 2 mg of Versed and 50 mcg of fentanyl, was monitored for conscious sedation beginning at 06:53 and ending at 07:08 by nurse Roseanne Marie. COMPLICATIONS:  None. SPECIMENS REMOVED:  None. ESTIMATED BLOOD LOSS:  Less than 5 mL. IMPLANTS:  None. PROCEDURE:  After informed consent, the patient was prepped and draped in the usual sterile fashion. The right wrist was infiltrated with lidocaine. The right radial artery was accessed by the modified Seldinger technique with a 6-Maltese sheath. A total of 40 mL of Isovue contrast were used for the entire procedure. A Terumo band was used for hemostasis. CATHETERS USED:  Included a 6-Maltese JL-3.5, a 5-Maltese JR-5. FINDINGS:  Left ventricle:  Left ventriculogram was deferred as the patient has recently undergone an echocardiogram indicating severe aortic stenosis. Left main:  With distal 30% stenosis. LAD had patent stents with luminal irregularities throughout without a focal stenosis greater than 20%. Circumflex had luminal irregularities without focal stenosis greater than 30%. The right coronary artery was dominant and had mild luminal irregularities. CONCLUSION:  This is a 70-year-old female who presents with chest pain concerning for symptomatic aortic stenosis and no evidence of obstructive coronary artery disease by heart catheterization.       Dino Regalado MD      [id number]  D:  2019 20:00  T:  2019 20:04  JOB #:  [id number]

## 2019-08-20 NOTE — PROGRESS NOTES
Problem: Falls - Risk of  Goal: *Absence of Falls  Description  Document Enrico Yanes Fall Risk and appropriate interventions in the flowsheet.   Outcome: Progressing Towards Goal  Note:   Fall Risk Interventions:            Medication Interventions: Evaluate medications/consider consulting pharmacy

## 2019-08-21 VITALS
BODY MASS INDEX: 28.93 KG/M2 | DIASTOLIC BLOOD PRESSURE: 49 MMHG | RESPIRATION RATE: 18 BRPM | HEIGHT: 59 IN | TEMPERATURE: 98.9 F | SYSTOLIC BLOOD PRESSURE: 98 MMHG | WEIGHT: 143.5 LBS | OXYGEN SATURATION: 97 % | HEART RATE: 69 BPM

## 2019-08-21 PROBLEM — R01.1 SYSTOLIC MURMUR: Chronic | Status: RESOLVED | Noted: 2018-08-21 | Resolved: 2019-08-21

## 2019-08-21 PROBLEM — I35.0 NONRHEUMATIC AORTIC VALVE STENOSIS: Status: ACTIVE | Noted: 2019-08-21

## 2019-08-21 LAB
ANION GAP SERPL CALC-SCNC: 6 MMOL/L (ref 7–16)
APPEARANCE UR: CLEAR
BILIRUB UR QL: NEGATIVE
BUN SERPL-MCNC: 8 MG/DL (ref 8–23)
CALCIUM SERPL-MCNC: 8.3 MG/DL (ref 8.3–10.4)
CHLORIDE SERPL-SCNC: 106 MMOL/L (ref 98–107)
CO2 SERPL-SCNC: 28 MMOL/L (ref 21–32)
COLOR UR: YELLOW
CREAT SERPL-MCNC: 0.37 MG/DL (ref 0.6–1)
ERYTHROCYTE [DISTWIDTH] IN BLOOD BY AUTOMATED COUNT: 13.2 % (ref 11.9–14.6)
EST. AVERAGE GLUCOSE BLD GHB EST-MCNC: 111 MG/DL
GLUCOSE SERPL-MCNC: 93 MG/DL (ref 65–100)
GLUCOSE UR STRIP.AUTO-MCNC: NEGATIVE MG/DL
HBA1C MFR BLD: 5.5 % (ref 4.8–6)
HCT VFR BLD AUTO: 30.2 % (ref 35.8–46.3)
HGB BLD-MCNC: 9.4 G/DL (ref 11.7–15.4)
HGB UR QL STRIP: NEGATIVE
KETONES UR QL STRIP.AUTO: NEGATIVE MG/DL
LEUKOCYTE ESTERASE UR QL STRIP.AUTO: NEGATIVE
MAGNESIUM SERPL-MCNC: 2.3 MG/DL (ref 1.8–2.4)
MCH RBC QN AUTO: 29.5 PG (ref 26.1–32.9)
MCHC RBC AUTO-ENTMCNC: 31.1 G/DL (ref 31.4–35)
MCV RBC AUTO: 94.7 FL (ref 79.6–97.8)
NITRITE UR QL STRIP.AUTO: NEGATIVE
NRBC # BLD: 0 K/UL (ref 0–0.2)
PH UR STRIP: 7 [PH] (ref 5–9)
PLATELET # BLD AUTO: 237 K/UL (ref 150–450)
PMV BLD AUTO: 8.9 FL (ref 9.4–12.3)
POTASSIUM SERPL-SCNC: 3.7 MMOL/L (ref 3.5–5.1)
PROT UR STRIP-MCNC: NEGATIVE MG/DL
RBC # BLD AUTO: 3.19 M/UL (ref 4.05–5.2)
SODIUM SERPL-SCNC: 140 MMOL/L (ref 136–145)
SP GR UR REFRACTOMETRY: 1.01 (ref 1–1.02)
UROBILINOGEN UR QL STRIP.AUTO: 1 EU/DL (ref 0.2–1)
WBC # BLD AUTO: 6.5 K/UL (ref 4.3–11.1)

## 2019-08-21 PROCEDURE — 83036 HEMOGLOBIN GLYCOSYLATED A1C: CPT

## 2019-08-21 PROCEDURE — 74011250637 HC RX REV CODE- 250/637: Performed by: NURSE PRACTITIONER

## 2019-08-21 PROCEDURE — 36415 COLL VENOUS BLD VENIPUNCTURE: CPT

## 2019-08-21 PROCEDURE — 80048 BASIC METABOLIC PNL TOTAL CA: CPT

## 2019-08-21 PROCEDURE — 81003 URINALYSIS AUTO W/O SCOPE: CPT

## 2019-08-21 PROCEDURE — 85027 COMPLETE CBC AUTOMATED: CPT

## 2019-08-21 PROCEDURE — 83735 ASSAY OF MAGNESIUM: CPT

## 2019-08-21 PROCEDURE — 99218 HC RM OBSERVATION: CPT

## 2019-08-21 RX ORDER — LISINOPRIL 10 MG/1
10 TABLET ORAL DAILY
Qty: 30 TAB | Refills: 5 | Status: SHIPPED | OUTPATIENT
Start: 2019-08-22 | End: 2019-09-16

## 2019-08-21 RX ADMIN — Medication 10 ML: at 06:03

## 2019-08-21 RX ADMIN — FLUTICASONE PROPIONATE 2 SPRAY: 50 SPRAY, METERED NASAL at 08:15

## 2019-08-21 RX ADMIN — METOPROLOL TARTRATE 25 MG: 25 TABLET ORAL at 08:14

## 2019-08-21 RX ADMIN — AMLODIPINE BESYLATE 5 MG: 5 TABLET ORAL at 08:14

## 2019-08-21 RX ADMIN — LISINOPRIL 10 MG: 5 TABLET ORAL at 08:13

## 2019-08-21 RX ADMIN — ASPIRIN 81 MG: 81 TABLET ORAL at 08:15

## 2019-08-21 NOTE — CONSULTS
Gina Martines. MD Bard Annie Leeedel. MD Roque Black         8/18/2019 1950    REFERRING PHYSICIAN:  Dr. Yfn Kevin:  The patient is a 76 y.o. female who presented to the ER with intermittent chest pain. Chest pain was occurring with exertion. She noted worsening fatigue as well. She was noted to have a murmur. Echo showed severe AS. She underwent LHC that showed mild non-obstructive CAD. She underwent BAM yesterday that confirmed severe AS with markedly reduced cuspal separation. Mean gradient was 59.82mmHg, peak gradient was 93mmHg. She states she is active at baseline. She has been doing water aerobics. Her and her  recently traveled to [facility] and [place]. She states she has noted worsening fatigue over the last few months but did not understand why this was occurring. She denies any dizziness or lightheadedness. Risk factors include prior CVA? (She states this occurred in her 35s and was related to migraine), tobacco abuse, HTN, dyslipidemia  She has marginal zone lymphoma and is followed by oncology at [facility]. She has been asymptomatic and has been on surveillance.     ** No history of prior cardiac surgery, prior vascular surgery, anesthetic complication, lung disease, DVT or PE, GI bleeding       Past Medical History:   Diagnosis Date    Abnormal weight gain     Acute bronchitis     Acute, but ill-defined, cerebrovascular disease 4/20/2015    Adjustment disorder with depressed mood     Allergic rhinitis, cause unspecified 4/21/2015    Asthma     Coronary atherosclerosis of unspecified type of vessel, native or graft 4/20/2015    Coronary atherosclerosis of unspecified type of vessel, native or graft     Coronary atherosclerosis of unspecified type of vessel, native or graft     Depressive disorder, not elsewhere classified 4/20/2015    Disorder of bone and cartilage, unspecified 4/21/2015    Encounter for long-term (current) use of other medications     Herpes zoster without mention of complication     Hypertension     Mixed incontinence urge and stress (male)(female) 2015    Other and unspecified hyperlipidemia     Overweight(278.02)     Pleurisy without mention of effusion or current tuberculosis     Premature menopause     Psychiatric disorder     MENOPAUSE    Rheumatoid arthritis(714.0)     Stroke ([facility] [address]     AT AGE 28    Systolic murmur     Unspecified asthma(493.90) 2015    Unspecified disorder of skin and subcutaneous tissue     Unspecified menopausal and postmenopausal disorder        Past Surgical History:   Procedure Laterality Date    ABDOMEN SURGERY PROC UNLISTED      GALLBLADDER    HX CHOLECYSTECTOMY      HX CORONARY STENT PLACEMENT      HX GYN  1998    HYSTERECTOMY    HX MOHS PROCEDURES  2005    HX ORTHOPAEDIC      SHOULDER    HX UROLOGICAL  2008    BLADDER TACK    VASCULAR SURGERY PROCEDURE UNLIST      VEIN IN R LEG       Family History   Problem Relation Age of Onset    Diabetes Mother     Glaucoma Mother     Heart Disease Mother     Alcohol abuse Father     Cancer Sister         pancreatic cancer    Diabetes Sister     Hypertension Sister     Heart Disease Sister     Diabetes Maternal Grandmother     Diabetes Paternal Grandmother        Social History     Socioeconomic History    Marital status:      Spouse name: Not on file    Number of children: Not on file    Years of education: Not on file    Highest education level: Not on file   Occupational History    Not on file   Social Needs    Financial resource strain: Not on file    Food insecurity:     Worry: Not on file     Inability: Not on file    Transportation needs:     Medical: Not on file     Non-medical: Not on file   Tobacco Use    Smoking status: Former Smoker     Last attempt to quit: 2001     Years since quittin.7    Smokeless tobacco: Never Used   Substance and Sexual Activity    Alcohol use: Yes     Alcohol/week: 3.3 standard drinks     Types: 3 Cans of beer, 1 Standard drinks or equivalent per week     Comment: occasionally    Drug use: No    Sexual activity: Not on file   Lifestyle    Physical activity:     Days per week: Not on file     Minutes per session: Not on file    Stress: Not on file   Relationships    Social connections:     Talks on phone: Not on file     Gets together: Not on file     Attends Mu-ism service: Not on file     Active member of club or organization: Not on file     Attends meetings of clubs or organizations: Not on file     Relationship status: Not on file    Intimate partner violence:     Fear of current or ex partner: Not on file     Emotionally abused: Not on file     Physically abused: Not on file     Forced sexual activity: Not on file   Other Topics Concern    Not on file   Social History Narrative    Not on file       Allergies   Allergen Reactions    Other Medication Rash     TAPE,  USE PAPER TAPE    Sulfa (Sulfonamide Antibiotics) Nausea Only       No current facility-administered medications on file prior to encounter. Current Outpatient Medications on File Prior to Encounter   Medication Sig Dispense Refill    citalopram (CELEXA) 20 mg tablet TAKE ONE TABLET BY MOUTH ONCE DAILY. (Patient taking differently: Take 20 mg by mouth nightly.) 30 Tab 11    clopidogrel (PLAVIX) 75 mg tab TAKE ONE TABLET BY MOUTH ONCE DAILY. 30 Tab 11    amLODIPine (NORVASC) 5 mg tablet TAKE ONE TABLET BY MOUTH ONCE DAILY. 30 Tab 11    pravastatin (PRAVACHOL) 40 mg tablet TAKE ONE TABLET BY MOUTH EVERY EVENING 90 Tab 4    metoprolol tartrate (LOPRESSOR) 25 mg tablet TAKE ONE TABLET BY MOUTH TWICE DAILY 180 Tab 3    ferrous sulfate 325 mg (65 mg iron) tablet Take  by mouth Daily (before breakfast).  cyanocobalamin 1,000 mcg tablet Take 1,000 mcg by mouth daily.  aspirin delayed-release 81 mg tablet Take  by mouth daily.       fluticasone propionate (FLONASE) 50 mcg/actuation nasal spray USE ONE TO TWO SPRAYS IN EACH NOSTRIL DAILY (Patient taking differently: as needed.) 16 g 11    nitroglycerin (NITROSTAT) 0.4 mg SL tablet 1 Tab by SubLINGual route every five (5) minutes as needed for Chest Pain. Indications: Angina 25 Tab 11    therapeutic multivitamin-minerals (THERAGRAN-M) tablet Take 1 Tab by mouth daily.  lactase (LACTAID) 3,000 unit chew Take  by mouth.  SIMETHICONE (PHAZYME PO) Take  by mouth.  ibuprofen (ADVIL) 200 mg tablet Take  by mouth. REVIEW OF SYSTEMS:  Review of Systems   Constitution: Positive for malaise/fatigue. Negative for chills, fever, weight gain and weight loss. HENT: Negative for ear pain, hearing loss, nosebleeds, sore throat and tinnitus. Eyes: Negative for blurred vision, vision loss in left eye and vision loss in right eye. Cardiovascular: Positive for chest pain. Negative for dyspnea on exertion, leg swelling, near-syncope, orthopnea, palpitations, paroxysmal nocturnal dyspnea and syncope. Respiratory: Negative for cough, hemoptysis, shortness of breath, sputum production and wheezing. Endocrine: Negative for cold intolerance, heat intolerance and polydipsia. Hematologic/Lymphatic: Does not bruise/bleed easily. Skin: Negative for color change and rash. Musculoskeletal: Negative for back pain, joint pain, joint swelling and myalgias. Gastrointestinal: Negative for abdominal pain, constipation, diarrhea, dysphagia, heartburn, hematemesis, melena, nausea and vomiting. Genitourinary: Negative for dysuria, frequency, hematuria and urgency. Neurological: Negative for difficulty with concentration, dizziness, headaches, light-headedness, numbness, paresthesias, seizures, vertigo and weakness. Psychiatric/Behavioral: Negative for altered mental status and depression.        Physical Exam  Vitals:    08/21/19 0000 08/21/19 0540 08/21/19 0810 08/21/19 0849   BP: 95/56 131/63 124/59    Pulse: 83 90 86 68   Resp: 18 18  18   Temp: 98.2 °F (36.8 °C) 97.9 °F (36.6 °C)  98.8 °F (37.1 °C)   SpO2: 94% 97%  97%   Weight:  143 lb 8 oz (65.1 kg)     Height:           Physical Exam:  General: Well Developed, Well Nourished, No Acute Distress  HEENT: Normocephalic, pupils equal and round, no scleral icterus  Neck: supple, no JVD  Chest wall: No deformity  Heart: S1S2 with RRR with grade III/VI ALEJA  Lungs: Clear throughout auscultation bilaterally without adventitious sounds  Vascular: Pulses are full and equal. There is no venous stasis disease.   Abd: soft, nontender, nondistended, with good bowel sounds, no pulsatile masses  Ext: warm, no edema, calves supple/nontender, pulses 2+ bilaterally  Skin: warm and dry, no rashes, cyanosis, jaundice, ecchymoses or evidence of skin breakdown  Psychiatric: Normal mood and affect  Neurologic: Alert and oriented X 3, no focal deficit noted    Labs:   Recent Labs     08/21/19  0500 08/20/19  0359 08/19/19  1217 08/19/19  0349    140  --  139   K 3.7 3.5  --  3.8   MG 2.3 2.3  --  2.3   BUN 8 10  --  13   CREA 0.37* 0.38*  --  0.40*   GLU 93 84  --  98   WBC 6.5 7.4  --  6.6   HGB 9.4* 9.8*  --  9.4*   HCT 30.2* 31.9*  --  29.7*    279  --  282   CHOL  --  83  --   --    HDL  --  33*  --   --    CHHD  --  2.5  --   --    LDLC  --  34.8  --   --    VLDL  --  15.2  --   --    TROIQ  --   --  <0.02* <0.02*       Lab Results   Component Value Date/Time    Cholesterol, total 83 08/20/2019 03:59 AM    HDL Cholesterol 33 (L) 08/20/2019 03:59 AM    LDL, calculated 34.8 08/20/2019 03:59 AM    VLDL, calculated 15.2 08/20/2019 03:59 AM    Triglyceride 76 08/20/2019 03:59 AM    CHOL/HDL Ratio 2.5 08/20/2019 03:59 AM       Recent Labs     08/19/19  1217 08/19/19  0349 08/18/19 2052   TROIQ <0.02* <0.02* <0.02*       Assessment:     Principal Problem:    Chest pain (8/19/2019)  Active Problems:    Essential hypertension, benign (3/12/2009)    Coronary atherosclerosis of native coronary vessel (3/12/2009)    Depression (4/20/2015)    Lymphoma ([facility] [address] (2/2/2016)    Dyslipidemia (10/18/2016)    Bruit (arterial) (10/18/2016)    Anemia (8/21/2018)    Fatigue (8/19/2019)    Nonrheumatic aortic valve stenosis (8/21/2019)       Plan:     Alan Best is to see preoperative teaching film that thoroughly discusses procedure, risks, and possible complications. Risks, benefits, and alternatives were discussed to include, but not limited to:     1. Bleeding  2. Arrhythmia   3. Infection including mediastinitis  4. Myocardial infarction  5. Need for reoperation  6. Renal failure  7. Respiratory failure  8. Stroke  9. Potential death      Pt is on Plavix, last dose 8/19. Dr. Breanna Frausto will personally view the cardiac catheterization, echocardiogram, and labs. TAVR vs SAVR discussed. The mortality risk for surgical AVR is 2.459%. Pt states she wishes to proceed with surgical AVR as she wants to get her valve fixed and move on with her life.         Tristan Richard PA-C

## 2019-08-21 NOTE — DISCHARGE SUMMARY
[address] Cardiology Discharge Summary     Patient ID:  Carmencita Hill  [id number]  68 y.o.  1950    Admit date: 8/18/2019    Discharge date:  8/21/19    Admitting Physician: Matthieu Ca MD     Discharge Physician: Wood Baxter NP/Dr. Cesar     Admission Diagnoses: Chest pain [R07.9]    Discharge Diagnoses:   Patient Active Problem List    Diagnosis Date Noted    Nonrheumatic aortic valve stenosis 08/21/2019    Chest pain 08/19/2019    Fatigue 08/19/2019    Anemia 08/21/2018    Postmenopausal 08/16/2017    Colon polyp 08/16/2017    Diverticulosis of large intestine 08/16/2017    Dyslipidemia 10/18/2016    Bruit (arterial) 10/18/2016    Lymphoma ([address] 02/02/2016    Mixed incontinence urge and stress (male)(female) 04/21/2015    Osteopenia 04/21/2015    Asthma 04/21/2015    Allergic rhinitis 04/21/2015    Migraine 04/21/2015    History of CVA (cerebrovascular accident) 04/20/2015    Depression 04/20/2015    Essential hypertension, benign 03/12/2009    Coronary atherosclerosis of native coronary vessel 03/12/2009    S/P angioplasty with stent 03/12/2009       Cardiology Procedures this admission:  Diagnostic left heart catheterization  EchoCardiogram  BAM  Consults: Cardiac Surgery    Hospital Course: Patient with PMHx of CAD (s/p PCI mLAD x1, 2009), HTN, HLD, AS, Anemia, Lymphoma (no tx), Asthma and Depression who presented to the ED with c/o CP. Has been having intermittent episodes of mid chest pressure for a couple months. Seems to be occurring more frequently now. States occurs with exertion and resolves with rest. Reports has noted having sweats with the episodes. Denies associated HA, dizziness, palpitations, syncope, N/V, edema. States has also had more SNOWDEN and fatigue. Even walking a very short distance causes her to have significant dyspnea. States she just feels \"weak. \" Reports taking all meds as directed. No recent illness, F/C/S.  States had f/u with Oncology re lymphoma 5/2019 and not treatment needed as her counts are good and she is asymptomatic. She was admitted to telemetry for serial trop, echo and likely LHC. An echocardiogram showed -  Left ventricle: Systolic function was mildly reduced. Ejection fraction was estimated in the range of 55 % to 60 %. There were no regional wall motion abnormalities. -  Left atrium: The atrium was mildly dilated. -  Aortic valve: The valve was not visualized well enough to rule out a bicuspid morphology. The aortic valve area by the continuity equation was 0.63 cm2. The findings were most consistent with severe aortic stenosis. Patient underwent cardiac catheterization by Dr. Jack on 8/19/19. Patient was found to have stable CAD. Patient tolerated the procedure well and was taken to the telemetry floor for recovery. There were concern for bicuspid AV so patient underwent BAM that showed -  Left ventricle: Systolic function was normal. Ejection fraction was estimated in the range of 55 % to 60 %. There were no regional wall motion abnormalities. -  Aortic valve: The valve was trileaflet. Leaflets exhibited calcification and markedly reduced cuspal separation. DI: 0.14. There was mild regurgitation. The findings were most consistent with severe aortic stenosis. Dr. Narda Montesinos saw patient morning of 8/21/19 and discussed TAVR and SAVR with pros/cons of each procedure. Patient opt for SAVR. CT Surgery Dr. Eugenia Perales saw patient in consultation and will proceed to SAVR next week. Her plavix will remain off plavix at d/c. The afternoon of 8/21/19, the patient was feeling well without any complaints of chest pain or shortness of breath. Patient's right radial cath site was clean, dry and intact without hematoma or bruit. Patient's labs were WNL. Patient was seen and examined by Dr. Narda Montesinos and determined stable and ready for discharge. Patient was instructed on the importance of medication compliance.  Instructed no plavix at d/c pending SAVR. The patient will follow up with [facility] Cardiology post SAVR. DISPOSITION: The patient is being discharged home in stable condition on a low saturated fat, low cholesterol and low salt diet. The patient is instructed to advance activities as tolerated to the limit of fatigue or shortness of breath. The patient is instructed to avoid all heavy lifting for 5 days. The patient is instructed to watch the cath site for bleeding/oozing; if seen, the patient is instructed to apply firm pressure with a clean cloth and call [facility] Cardiology at [phone]. The patient is instructed to watch for signs of infection which include: increasing area of redness, fever/hot to touch or purulent drainage at the catheterization site. The patient is instructed not to soak in a bathtub for 7-10 days, but is cleared to shower. The patient is instructed to call the office or return to the ER for immediate evaluation for any shortness of breath or chest pain not relieved by NTG. Discharge Exam:   Visit Vitals  /59   Pulse 68   Temp 98.8 °F (37.1 °C)   Resp 18   Ht 4' 11.25\" (1.505 m)   Wt 143 lb 8 oz (65.1 kg)   SpO2 97%   BMI 28.74 kg/m²     Patient has been seen by Dr. Verena Guerrero: see his progress note for exam details.     Recent Results (from the past 24 hour(s))   MAGNESIUM    Collection Time: 08/21/19  5:00 AM   Result Value Ref Range    Magnesium 2.3 1.8 - 2.4 mg/dL   CBC W/O DIFF    Collection Time: 08/21/19  5:00 AM   Result Value Ref Range    WBC 6.5 4.3 - 11.1 K/uL    RBC 3.19 (L) 4.05 - 5.2 M/uL    HGB 9.4 (L) 11.7 - 15.4 g/dL    HCT 30.2 (L) 35.8 - 46.3 %    MCV 94.7 79.6 - 97.8 FL    MCH 29.5 26.1 - 32.9 PG    MCHC 31.1 (L) 31.4 - 35.0 g/dL    RDW 13.2 11.9 - 14.6 %    PLATELET 234 021 - 329 K/uL    MPV 8.9 (L) 9.4 - 12.3 FL    ABSOLUTE NRBC 0.00 0.0 - 0.2 K/uL   METABOLIC PANEL, BASIC    Collection Time: 08/21/19  5:00 AM   Result Value Ref Range    Sodium 140 136 - 145 mmol/L    Potassium 3.7 3.5 - 5.1 mmol/L    Chloride 106 98 - 107 mmol/L    CO2 28 21 - 32 mmol/L    Anion gap 6 (L) 7 - 16 mmol/L    Glucose 93 65 - 100 mg/dL    BUN 8 8 - 23 MG/DL    Creatinine 0.37 (L) 0.6 - 1.0 MG/DL    GFR est AA >60 >60 ml/min/1.73m2    GFR est non-AA >60 >60 ml/min/1.73m2    Calcium 8.3 8.3 - 10.4 MG/DL         Patient Instructions:     Current Discharge Medication List      START taking these medications    Details   lisinopril (PRINIVIL, ZESTRIL) 10 mg tablet Take 1 Tab by mouth daily. Qty: 30 Tab, Refills: 5         CONTINUE these medications which have NOT CHANGED    Details   citalopram (CELEXA) 20 mg tablet TAKE ONE TABLET BY MOUTH ONCE DAILY. Qty: 30 Tab, Refills: 11      pravastatin (PRAVACHOL) 40 mg tablet TAKE ONE TABLET BY MOUTH EVERY EVENING  Qty: 90 Tab, Refills: 4      metoprolol tartrate (LOPRESSOR) 25 mg tablet TAKE ONE TABLET BY MOUTH TWICE DAILY  Qty: 180 Tab, Refills: 3      ferrous sulfate 325 mg (65 mg iron) tablet Take  by mouth Daily (before breakfast). cyanocobalamin 1,000 mcg tablet Take 1,000 mcg by mouth daily. aspirin delayed-release 81 mg tablet Take  by mouth daily. fluticasone propionate (FLONASE) 50 mcg/actuation nasal spray USE ONE TO TWO SPRAYS IN EACH NOSTRIL DAILY  Qty: 16 g, Refills: 11      nitroglycerin (NITROSTAT) 0.4 mg SL tablet 1 Tab by SubLINGual route every five (5) minutes as needed for Chest Pain. Indications: Angina  Qty: 25 Tab, Refills: 11      therapeutic multivitamin-minerals (THERAGRAN-M) tablet Take 1 Tab by mouth daily. lactase (LACTAID) 3,000 unit chew Take  by mouth. SIMETHICONE (PHAZYME PO) Take  by mouth.          STOP taking these medications       clopidogrel (PLAVIX) 75 mg tab Comments:   Reason for Stopping:         amLODIPine (NORVASC) 5 mg tablet Comments:   Reason for Stopping:         ibuprofen (ADVIL) 200 mg tablet Comments:   Reason for Stopping:                 Signed:  PATRICIO Dennis  8/21/2019  12:21 PM

## 2019-08-21 NOTE — PROGRESS NOTES
Verbal bedside report received from [place], [facility]. Assumed care of patient.

## 2019-08-21 NOTE — PROGRESS NOTES
Care Management Interventions  PCP Verified by CM: Yes  Last Visit to PCP: 02/26/19  Mode of Transport at Discharge: Other (see comment)(family)  Transition of Care Consult (CM Consult): Discharge Planning  Discharge Durable Medical Equipment: No  Physical Therapy Consult: No  Occupational Therapy Consult: No  Speech Therapy Consult: No  Current Support Network: Lives with Spouse  Confirm Follow Up Transport: Family  Plan discussed with Pt/Family/Caregiver: Yes  Freedom of Choice Offered: Yes  Discharge Location  Discharge Placement: Home  Patient ready for d/c today.  Patient d/c home with family

## 2019-08-21 NOTE — DISCHARGE INSTRUCTIONS
Patient Education        Aortic Valve Replacement: Before Your Surgery  What is aortic valve replacement? Aortic valve replacement gives you a new aortic heart valve. The new valve may be mechanical or made of animal tissue, often from a pig. Your doctor will talk with you before surgery about which type of valve is best for you. The aortic valve opens and closes to keep blood flowing in the proper direction through your heart. When the aortic valve does not close properly, it's called aortic valve regurgitation. If the valve is very tight and narrow, it's called aortic valve stenosis. In both of these cases, blood does not flow through the heart the right way. You will be asleep during the surgery. Your doctor will make a cut in the skin over your breastbone (sternum). This cut is called an incision. Then the doctor will cut through your sternum to reach your heart. The doctor will connect you to a heart-lung bypass machine. It adds oxygen to your blood and moves the blood through your body. This machine will allow the doctor to stop your heartbeat and replace the valve. After the doctor has replaced your aortic valve, he or she will restart your heartbeat. Then the doctor will use wire to put your sternum back together. Your incision will be closed with stitches or staples. The wire will stay in your chest. The incision will leave a scar that may fade with time. You will stay in the hospital for 3 to 8 days after surgery. Follow-up care is a key part of your treatment and safety. Be sure to make and go to all appointments, and call your doctor if you are having problems. It's also a good idea to know your test results and keep a list of the medicines you take. What happens before surgery?   Surgery can be stressful. This information will help you understand what you can expect.  And it will help you safely prepare for surgery.   Preparing for surgery    · Understand exactly what surgery is planned, along with the risks, benefits, and other options. · Tell your doctors ALL the medicines, vitamins, supplements, and herbal remedies you take. Some of these can increase the risk of bleeding or interact with anesthesia.     · If you take blood thinners, such as warfarin (Coumadin), clopidogrel (Plavix), or aspirin, be sure to talk to your doctor. He or she will tell you if you should stop taking these medicines before your surgery. Make sure that you understand exactly what your doctor wants you to do.     · Your doctor will tell you which medicines to take or stop before your surgery. You may need to stop taking certain medicines a week or more before surgery. So talk to your doctor as soon as you can.     · If you have an advance directive, let your doctor know. It may include a living will and a durable power of  for health care. Bring a copy to the hospital. If you don't have one, you may want to prepare one. It lets your doctor and loved ones know your health care wishes. Doctors advise that everyone prepare these papers before any type of surgery or procedure. What happens on the day of surgery? · Follow the instructions exactly about when to stop eating and drinking. If you don't, your surgery may be canceled. If your doctor told you to take your medicines on the day of surgery, take them with only a sip of water.     · Take a bath or shower before you come in for your surgery. Do not apply lotions, perfumes, deodorants, or nail polish.     · Do not shave the surgical site yourself.     · Take off all jewelry and piercings. And take out contact lenses, if you wear them.    At the hospital or surgery center   · Bring a picture ID.     · The area for surgery is often marked to make sure there are no errors.     · You will be kept comfortable and safe by your anesthesia provider.  You will be asleep during the surgery.     · The surgery will take about 3 to 5 hours.    After surgery    · You will go to the intensive care unit (ICU) right after surgery. You will probably stay in the ICU for 1 or 2 days before you go to your regular hospital room.     · You will have a breathing tube down your throat. This is usually removed within 6 hours after surgery. You will not be able to talk or drink liquids while the tube is in your throat. After the tube is removed, your throat will feel dry and scratchy. Your nurse will tell you when it is safe to drink liquids again.     · As you wake up in the ICU, the nurse will check to be sure you are stable and comfortable. It is important for you to tell your doctor and nurse how you feel and ask questions about any concerns you may have.     · You will have a thin plastic tube in a vein in your neck. This tube is called a catheter. It is used to keep track of how well your heart is working. This is usually removed in 1 to 3 days.     · You will have chest tubes to drain fluid and blood after surgery. The fluid and extra blood are normal. They usually last for only a few days. The chest tubes are usually removed in 1 or 2 days.     · You will have several thin wires coming out of your chest near your incision. These wires can help keep your heartbeat steady after surgery. They will be removed before you go home. Going home   · Be sure you have someone to drive you home. Anesthesia and pain medicine make it unsafe for you to drive.     · You will be given more specific instructions about recovering from your surgery. They will cover things like diet, wound care, follow-up care, driving, and getting back to your normal routine. When should you call your doctor? · You have questions or concerns.     · You don't understand how to prepare for your surgery.     · You become ill before the surgery (such as fever, flu, or a cold).     · You need to reschedule or have changed your mind about having the surgery. Where can you learn more?   Go to http://jigar-christopher.info/. Enter K680 in the search box to learn more about \"Aortic Valve Replacement: Before Your Surgery. \"  Current as of: July 22, 2018  Content Version: 12.1  © 9157-1397 [employer]. Care instructions adapted under license by [employer] (which disclaims liability or warranty for this information). If you have questions about a medical condition or this instruction, always ask your healthcare professional. [address] any warranty or liability for your use of this information. Patient Education   Lisinopril (By mouth)   Lisinopril (lye-SIN-oh-pril)  Treats high blood pressure and heart failure. Also given to reduce the risk of death after a heart attack. This medicine is an ACE inhibitor. Brand Name(s): Prinivil, Qbrelis, Zestril   There may be other brand names for this medicine. When This Medicine Should Not Be Used: This medicine is not right for everyone. Do not use it if you had an allergic reaction to lisinopril or another ACE inhibitor, or if you are pregnant. How to Use This Medicine:   Liquid, Tablet  · Take your medicine as directed. Your dose may need to be changed several times to find what works best for you. · Oral liquid: Measure the oral liquid medicine with a marked measuring spoon, oral syringe, or medicine cup. · Missed dose: Take a dose as soon as you remember. If it is almost time for your next dose, wait until then and take a regular dose. Do not take extra medicine to make up for a missed dose. · Store the medicine in a closed container at room temperature, away from heat, moisture, and direct light. Drugs and Foods to Avoid:   Ask your doctor or pharmacist before using any other medicine, including over-the-counter medicines, vitamins, and herbal products. · Do not use this medicine together with aliskiren if you have diabetes. · Some foods and medicines may affect how lisinopril works.  Tell your doctor if you are using any of the following:   ¨ Aliskiren, everolimus, lithium, sirolimus, temsirolimus  ¨ Another blood pressure medicine, including an angiotensin receptor blocker (ARB)  ¨ Diuretic (water pill, including amiloride, spironolactone, triamterene)  ¨ Insulin or diabetes medicine  ¨ NSAID pain or arthritis medicine (including aspirin, celecoxib, diclofenac, ibuprofen, naproxen)  · Ask your doctor before you use any medicine, supplement, or salt substitute that contains potassium. Warnings While Using This Medicine:   · It is not safe to take this medicine during pregnancy. It could harm an unborn baby. Tell your doctor right away if you become pregnant. · Tell your doctor if you are breastfeeding, or if you have kidney disease, liver disease, diabetes, or heart or blood vessel disease. · This medicine may cause the following problems:  ¨ Angioedema (severe swelling)  ¨ Kidney problems  ¨ Serious liver problems  · This medicine could lower your blood pressure too much, especially when you first use it or if you are dehydrated. Stand or sit up slowly if you feel lightheaded or dizzy. · Do not stop using this medicine without asking your doctor, even if you feel well. This medicine will not cure your high blood pressure, but it will help keep it in a normal range. You may have to take blood pressure medicine for the rest of your life. · Tell any doctor or dentist who treats you that you are using this medicine. · Your doctor will do lab tests at regular visits to check on the effects of this medicine. Keep all appointments. · Keep all medicine out of the reach of children. Never share your medicine with anyone.   Possible Side Effects While Using This Medicine:   Call your doctor right away if you notice any of these side effects:  · Allergic reaction: Itching or hives, swelling in your face or hands, swelling or tingling in your mouth or throat, chest tightness, trouble breathing  · Blistering, peeling, or red skin rash  · Change in how much or how often you urinate  · Confusion, weakness, uneven heartbeat, trouble breathing, numbness or tingling in your hands, feet, or lips  · Dark urine or pale stools, nausea, vomiting, loss of appetite, stomach pain, yellow skin or eyes  · Fever, chills, sore throat, body aches  · Lightheadedness, dizziness, fainting  · Severe stomach pain (with or without nausea or vomiting)  If you notice these less serious side effects, talk with your doctor:   · Dry cough  If you notice other side effects that you think are caused by this medicine, tell your doctor. Call your doctor for medical advice about side effects. You may report side effects to FDA at 8-464-FDA-2960  © 2017 [facility] Information is for End User's use only and may not be sold, redistributed or otherwise used for commercial purposes. The above information is an  only. It is not intended as medical advice for individual conditions or treatments. Talk to your doctor, nurse or pharmacist before following any medical regimen to see if it is safe and effective for you.

## 2019-08-21 NOTE — PROCEDURES
[facility] CARDIOLOGY PROGRESS NOTE           8/21/2019 8:46 AM    Admit Date: 8/18/2019      Subjective:   Patient denies any chest pain or dyspnea. BP low. No dizziness or syncope. ROS:  Cardiovascular:  As noted above    Objective:      Vitals:    08/20/19 2141 08/21/19 0000 08/21/19 0540 08/21/19 0810   BP: 94/48 95/56 131/63 124/59   Pulse: 69 83 90 86   Resp: 18 18 18    Temp: 97.8 °F (36.6 °C) 98.2 °F (36.8 °C) 97.9 °F (36.6 °C)    SpO2: 95% 94% 97%    Weight:   65.1 kg (143 lb 8 oz)    Height:           Physical Exam:  General-No Acute Distress  Neck- supple, no JVD  CV- regular rate and rhythm Grade III/VI ALEJA  Lung- clear bilaterally  Abd- soft, nontender, nondistended  Ext- no edema bilaterally. Skin- warm and dry      Data Review:   Recent Labs     08/21/19  0500 08/20/19  0359    140   K 3.7 3.5   MG 2.3 2.3   BUN 8 10   CREA 0.37* 0.38*   GLU 93 84   WBC 6.5 7.4   HGB 9.4* 9.8*   HCT 30.2* 31.9*    279   TRIGL  --  76   HDL  --  33*      No results found for: NURY Nagel    Assessment/Plan:     Principal Problem:    Chest pain (8/19/2019)    Cath stable. Due to severe aortic stenosis. Active Problems:    Essential hypertension, benign (3/12/2009)    BP low. Hold Norvasc. Coronary atherosclerosis of native coronary vessel (3/12/2009)    Stable CAD. Dyslipidemia (10/18/2016)    On Pravachol. Bruit (arterial) (10/18/2016)    Carotid artery with no significant stenosis. Anemia (8/21/2018)    Stable. Aortic Stenosis  Discussed in detail with patient need for AVR. Discussed TAVR and SAVR with pros/cons of each procedure. She would like to proceed with SAVR given known durability of valve. Will ask CT surgery to see. Plavix has been held. ? Home with surgery next week. Will discuss with surgery after they have seen her.                    Francesca Marie MD  8/21/2019 8:46 AM

## 2019-08-21 NOTE — PROGRESS NOTES
Problem: Falls - Risk of  Goal: *Absence of Falls  Description  Document Aleksandr Fells Fall Risk and appropriate interventions in the flowsheet.   Outcome: Progressing Towards Goal  Note:   Fall Risk Interventions:            Medication Interventions: Patient to call before getting OOB, Evaluate medications/consider consulting pharmacy

## 2019-08-22 ENCOUNTER — TELEPHONE (OUTPATIENT)
Dept: CASE MANAGEMENT | Age: 69
End: 2019-08-22

## 2019-08-22 DIAGNOSIS — I35.0 AORTIC VALVE STENOSIS, ETIOLOGY OF CARDIAC VALVE DISEASE UNSPECIFIED: Primary | ICD-10-CM

## 2019-08-22 NOTE — TELEPHONE ENCOUNTER
Pt decided she would like a TAVR and her SAVR has been cancelled. Called and instructed pt to arrive at 0730 on 8/23 for a TAVR CT. Nothing to eat after 0400 and nothing to drink except sips with meds after 0600.  reinded pt to take am dose metoprolol prior to arrival.    Debbie Castano, Structural Heart Navigator

## 2019-08-23 ENCOUNTER — HOSPITAL ENCOUNTER (OUTPATIENT)
Dept: CT IMAGING | Age: 69
Discharge: HOME | End: 2019-08-23
Attending: INTERNAL MEDICINE
Payer: MEDICARE

## 2019-08-23 VITALS — WEIGHT: 140 LBS | HEIGHT: 59 IN | BODY MASS INDEX: 28.22 KG/M2

## 2019-08-23 DIAGNOSIS — I35.0 AORTIC VALVE STENOSIS, ETIOLOGY OF CARDIAC VALVE DISEASE UNSPECIFIED: ICD-10-CM

## 2019-08-23 PROCEDURE — 75574 CT ANGIO HRT W/3D IMAGE: CPT

## 2019-08-23 PROCEDURE — 71275 CT ANGIOGRAPHY CHEST: CPT

## 2019-08-23 PROCEDURE — 74011000258 HC RX REV CODE- 258: Performed by: INTERNAL MEDICINE

## 2019-08-23 PROCEDURE — 74011636320 HC RX REV CODE- 636/320: Performed by: INTERNAL MEDICINE

## 2019-08-23 RX ORDER — SODIUM CHLORIDE 0.9 % (FLUSH) 0.9 %
10 SYRINGE (ML) INJECTION
Status: COMPLETED | OUTPATIENT
Start: 2019-08-23 | End: 2019-08-23

## 2019-08-23 RX ADMIN — IOPAMIDOL 75 ML: 755 INJECTION, SOLUTION INTRAVENOUS at 08:10

## 2019-08-23 RX ADMIN — SODIUM CHLORIDE 100 ML: 900 INJECTION, SOLUTION INTRAVENOUS at 08:10

## 2019-08-23 RX ADMIN — Medication 10 ML: at 08:10

## 2019-08-26 ENCOUNTER — HOSPITAL ENCOUNTER (OUTPATIENT)
Dept: SURGERY | Age: 69
Discharge: HOME | End: 2019-08-26
Payer: MEDICARE

## 2019-08-26 ENCOUNTER — ANESTHESIA EVENT (OUTPATIENT)
Dept: SURGERY | Age: 69
End: 2019-08-26

## 2019-08-26 VITALS
DIASTOLIC BLOOD PRESSURE: 45 MMHG | HEART RATE: 66 BPM | SYSTOLIC BLOOD PRESSURE: 111 MMHG | RESPIRATION RATE: 16 BRPM | BODY MASS INDEX: 28.73 KG/M2 | HEIGHT: 59 IN | WEIGHT: 142.5 LBS | TEMPERATURE: 97.6 F | OXYGEN SATURATION: 97 %

## 2019-08-26 LAB
BACTERIA SPEC CULT: NORMAL
BNP SERPL-MCNC: 363 PG/ML
INR PPP: 1.1
PROTHROMBIN TIME: 14 SEC (ref 11.7–14.5)
SERVICE CMNT-IMP: NORMAL

## 2019-08-26 PROCEDURE — 83880 ASSAY OF NATRIURETIC PEPTIDE: CPT

## 2019-08-26 PROCEDURE — 87641 MR-STAPH DNA AMP PROBE: CPT

## 2019-08-26 PROCEDURE — 94010 BREATHING CAPACITY TEST: CPT

## 2019-08-26 PROCEDURE — 86900 BLOOD TYPING SEROLOGIC ABO: CPT

## 2019-08-26 PROCEDURE — 86923 COMPATIBILITY TEST ELECTRIC: CPT

## 2019-08-26 PROCEDURE — 85610 PROTHROMBIN TIME: CPT

## 2019-08-26 RX ORDER — CITALOPRAM 20 MG/1
20 TABLET, FILM COATED ORAL
COMMUNITY
End: 2020-06-25 | Stop reason: SDUPTHER

## 2019-08-26 RX ORDER — CLOPIDOGREL BISULFATE 75 MG/1
75 TABLET ORAL DAILY
COMMUNITY
End: 2020-04-16 | Stop reason: SDUPTHER

## 2019-08-26 RX ORDER — FLUTICASONE PROPIONATE 50 MCG
2 SPRAY, SUSPENSION (ML) NASAL AS NEEDED
COMMUNITY
End: 2020-11-30 | Stop reason: SDUPTHER

## 2019-08-26 NOTE — ANESTHESIA PREPROCEDURE EVALUATION
Relevant Problems   No relevant active problems       Anesthetic History     PONV          Review of Systems / Medical History      Pulmonary            Asthma        Neuro/Psych       CVA  Headaches    Comments: CVA?  30 yrs ago sequelae of migraines Cardiovascular      Valvular problems/murmurs: aortic stenosis        CAD      Comments: EKG -LVH,EF% 55; severe AS mean gradient 59   GI/Hepatic/Renal                Endo/Other        Morbid obesity     Other Findings            Physical Exam    Airway  Mallampati: II  TM Distance: > 6 cm  Neck ROM: normal range of motion   Mouth opening: Normal     Cardiovascular    Rhythm: regular           Dental    Dentition: Caps/crowns     Pulmonary                 Abdominal  GI exam deferred       Other Findings            Anesthetic Plan    ASA: 4  Anesthesia type: general    Monitoring Plan: Arterial line      Induction: Intravenous  Anesthetic plan and risks discussed with: Patient

## 2019-08-26 NOTE — PERIOP NOTES
Patient verified name and . Patient provided medical/health information and PTA medications to the best of their ability. TYPE  CASE: 3  Order for consent Order for consent NOT found in EHR at time of PAT visit. Unable to verify case posting against order; surgery verified by patient. Labs per surgeon: Pt/INR, PRBC,BNP,MRSA/MSSA, labs pending. Labs per anesthesia protocol: no additional- CBC/BMP 19  EKG:  EKG 19,Cath 19, ECHO . Patient provided with and instructed on educaitonal handouts including Heart Guide to Surgery, blood transfusions, pain management, central line infection prevention, being on a ventilator and hand hygiene for the family and community, and [facility] brochure. Instructed that family must be present in building at all times. Incentive spirometry completed with return demonstration. FEV1 completed as ordered. Patient viewed pre-operative DVD. Instructed on type and cross match procedure and not to remove green blood bank bracelet. Instructed on medication- [facility] [phone]. Mupirocin ointment to be used the night before surgery and the am of surgery. Hibiclens and instructions given per hospital policy. Instructed patient to continue previous medications as prescribed prior to surgery unless otherwise directed and to take the following medications the day of surgery according to anesthesia guidelines : Aspirin 81mg, Plavix, and Lopressor . Instructed patient to hold  the following medications: Vitamin b12, Advil, Multivitamin, and all other vitamins, supplements and NSAIDs. Original medication prescription bottles were visualized during patient appointment. Patient teach back successful and patient demonstrates knowledge of instruction.

## 2019-08-26 NOTE — PERIOP NOTES
Recent Results (from the past 12 hour(s))   MSSA/MRSA SC BY PCR, NASAL SWAB    Collection Time: 08/26/19  8:18 AM   Result Value Ref Range    Special Requests: NO SPECIAL REQUESTS      Culture result:        SA target not detected. A MRSA NEGATIVE, SA NEGATIVE test result does not preclude MRSA or SA nasal colonization.    TYPE + CROSSMATCH    Collection Time: 08/26/19  8:36 AM   Result Value Ref Range    Crossmatch Expiration 08/29/2019     ABO/Rh(D) A NEGATIVE     Antibody screen NEG     Unit number [id number]     Blood component type  LR     Unit division 00     Status of unit ALLOCATED     Crossmatch result Compatible     Unit number [id number]     Blood component type  LR     Unit division 00     Status of unit ALLOCATED     Crossmatch result Compatible     Unit number [id number]     Blood component type  LR     Unit division 00     Status of unit ALLOCATED     Crossmatch result Compatible     Unit number [id number]     Blood component type  LR     Unit division 00     Status of unit ALLOCATED     Crossmatch result Compatible    PROTHROMBIN TIME + INR    Collection Time: 08/26/19  8:46 AM   Result Value Ref Range    Prothrombin time 14.0 11.7 - 14.5 sec    INR 1.1     BNP    Collection Time: 08/26/19  8:46 AM   Result Value Ref Range     (H) 0 pg/mL

## 2019-08-27 ENCOUNTER — ANESTHESIA (OUTPATIENT)
Dept: SURGERY | Age: 69
End: 2019-08-27

## 2019-08-27 NOTE — PROCEDURES
[facility]  PROCEDURE NOTE    Name:  Arnoldo Crowell  MR#:  [id number]  :  1950  ACCOUNT #:  [de-identified]  DATE OF SERVICE:  2019      INTERPRETATION OF SPIROMETRY    SPIROMETRY:  FVC 2.08 liters and 90% of predicted. FEV1 1.71 liters and 106% of predicted. Ratio 82%. IMPRESSION:  This is a normal spirometry.       Mindy Raymundo MD      [id number]  D:  2019 15:42  T:  2019 23:55  JOB #:  [id number]

## 2019-08-30 LAB
ABO + RH BLD: NORMAL
BLD PROD TYP BPU: NORMAL
BLOOD GROUP ANTIBODIES SERPL: NORMAL
BPU ID: NORMAL
CROSSMATCH RESULT,%XM: NORMAL
SPECIMEN EXP DATE BLD: NORMAL
STATUS OF UNIT,%ST: NORMAL
UNIT DIVISION, %UDIV: 0

## 2019-09-03 ENCOUNTER — ANESTHESIA EVENT (OUTPATIENT)
Dept: SURGERY | Age: 69
DRG: 220 | End: 2019-09-03
Payer: MEDICARE

## 2019-09-03 ENCOUNTER — HOSPITAL ENCOUNTER (OUTPATIENT)
Dept: SURGERY | Age: 69
Discharge: HOME | End: 2019-09-03
Payer: MEDICARE

## 2019-09-03 VITALS
BODY MASS INDEX: 28.95 KG/M2 | HEIGHT: 59 IN | RESPIRATION RATE: 16 BRPM | TEMPERATURE: 98.4 F | DIASTOLIC BLOOD PRESSURE: 44 MMHG | WEIGHT: 143.6 LBS | HEART RATE: 65 BPM | OXYGEN SATURATION: 96 % | SYSTOLIC BLOOD PRESSURE: 119 MMHG

## 2019-09-03 LAB
ALBUMIN SERPL-MCNC: 3.2 G/DL (ref 3.2–4.6)
ALBUMIN/GLOB SERPL: 0.8 {RATIO} (ref 1.2–3.5)
ALP SERPL-CCNC: 114 U/L (ref 50–136)
ALT SERPL-CCNC: 10 U/L (ref 12–65)
ANION GAP SERPL CALC-SCNC: 5 MMOL/L (ref 7–16)
AST SERPL-CCNC: 9 U/L (ref 15–37)
BILIRUB SERPL-MCNC: 0.4 MG/DL (ref 0.2–1.1)
BUN SERPL-MCNC: 13 MG/DL (ref 8–23)
CALCIUM SERPL-MCNC: 9 MG/DL (ref 8.3–10.4)
CHLORIDE SERPL-SCNC: 106 MMOL/L (ref 98–107)
CO2 SERPL-SCNC: 28 MMOL/L (ref 21–32)
CREAT SERPL-MCNC: 0.41 MG/DL (ref 0.6–1)
ERYTHROCYTE [DISTWIDTH] IN BLOOD BY AUTOMATED COUNT: 13.2 % (ref 11.9–14.6)
GLOBULIN SER CALC-MCNC: 4.2 G/DL (ref 2.3–3.5)
GLUCOSE SERPL-MCNC: 89 MG/DL (ref 65–100)
HCT VFR BLD AUTO: 33.8 % (ref 35.8–46.3)
HGB BLD-MCNC: 10.4 G/DL (ref 11.7–15.4)
MAGNESIUM SERPL-MCNC: 2.3 MG/DL (ref 1.8–2.4)
MCH RBC QN AUTO: 29.1 PG (ref 26.1–32.9)
MCHC RBC AUTO-ENTMCNC: 30.8 G/DL (ref 31.4–35)
MCV RBC AUTO: 94.7 FL (ref 79.6–97.8)
NRBC # BLD: 0 K/UL (ref 0–0.2)
PLATELET # BLD AUTO: 284 K/UL (ref 150–450)
PMV BLD AUTO: 9.1 FL (ref 9.4–12.3)
POTASSIUM SERPL-SCNC: 4.4 MMOL/L (ref 3.5–5.1)
PROT SERPL-MCNC: 7.4 G/DL (ref 6.3–8.2)
RBC # BLD AUTO: 3.57 M/UL (ref 4.05–5.2)
SODIUM SERPL-SCNC: 139 MMOL/L (ref 136–145)
WBC # BLD AUTO: 7.4 K/UL (ref 4.3–11.1)

## 2019-09-03 PROCEDURE — 85027 COMPLETE CBC AUTOMATED: CPT

## 2019-09-03 PROCEDURE — 86923 COMPATIBILITY TEST ELECTRIC: CPT

## 2019-09-03 PROCEDURE — 83735 ASSAY OF MAGNESIUM: CPT

## 2019-09-03 PROCEDURE — 86900 BLOOD TYPING SEROLOGIC ABO: CPT

## 2019-09-03 PROCEDURE — 80053 COMPREHEN METABOLIC PANEL: CPT

## 2019-09-03 RX ORDER — AMIODARONE HYDROCHLORIDE 200 MG/1
600 TABLET ORAL ONCE
COMMUNITY
Start: 2019-09-03 | End: 2019-09-09

## 2019-09-03 NOTE — PERIOP NOTES
Recent Results (from the past 12 hour(s))   TYPE + CROSSMATCH    Collection Time: 09/03/19  8:16 AM   Result Value Ref Range    Crossmatch Expiration 09/06/2019     ABO/Rh(D) A NEGATIVE     Antibody screen NEG     Unit number [id number]     Blood component type RC LR     Unit division 00     Status of unit ALLOCATED     Crossmatch result Compatible     Unit number [id number]     Blood component type RC LR     Unit division 00     Status of unit ALLOCATED     Crossmatch result Compatible     Unit number [id number]     Blood component type RC LR     Unit division 00     Status of unit ALLOCATED     Crossmatch result Compatible     Unit number [id number]     Blood component type RC LR     Unit division 00     Status of unit ALLOCATED     Crossmatch result Compatible    CBC W/O DIFF    Collection Time: 09/03/19  8:16 AM   Result Value Ref Range    WBC 7.4 4.3 - 11.1 K/uL    RBC 3.57 (L) 4.05 - 5.2 M/uL    HGB 10.4 (L) 11.7 - 15.4 g/dL    HCT 33.8 (L) 35.8 - 46.3 %    MCV 94.7 79.6 - 97.8 FL    MCH 29.1 26.1 - 32.9 PG    MCHC 30.8 (L) 31.4 - 35.0 g/dL    RDW 13.2 11.9 - 14.6 %    PLATELET 591 081 - 437 K/uL    MPV 9.1 (L) 9.4 - 12.3 FL    ABSOLUTE NRBC 0.00 0.0 - 0.2 K/uL   METABOLIC PANEL, COMPREHENSIVE    Collection Time: 09/03/19  8:16 AM   Result Value Ref Range    Sodium 139 136 - 145 mmol/L    Potassium 4.4 3.5 - 5.1 mmol/L    Chloride 106 98 - 107 mmol/L    CO2 28 21 - 32 mmol/L    Anion gap 5 (L) 7 - 16 mmol/L    Glucose 89 65 - 100 mg/dL    BUN 13 8 - 23 MG/DL    Creatinine 0.41 (L) 0.6 - 1.0 MG/DL    GFR est AA >60 >60 ml/min/1.73m2    GFR est non-AA >60 >60 ml/min/1.73m2    Calcium 9.0 8.3 - 10.4 MG/DL    Bilirubin, total 0.4 0.2 - 1.1 MG/DL    ALT (SGPT) 10 (L) 12 - 65 U/L    AST (SGOT) 9 (L) 15 - 37 U/L    Alk.  phosphatase 114 50 - 136 U/L    Protein, total 7.4 6.3 - 8.2 g/dL    Albumin 3.2 3.2 - 4.6 g/dL    Globulin 4.2 (H) 2.3 - 3.5 g/dL    A-G Ratio 0.8 (L) 1.2 - 3.5     MAGNESIUM Collection Time: 09/03/19  8:16 AM   Result Value Ref Range    Magnesium 2.3 1.8 - 2.4 mg/dL    Lab results reviewed and routed to surgeon.

## 2019-09-03 NOTE — PERIOP NOTES
Patient confirms name and . Order to obtain consent found in EHR and matches case posting. Labs/Orders per Surgeon: completed --- Previous tests/lab results reviewed from 19 and 19 including CXR 19, Bilateral Carotid U/S 19, EKG 19, BAM Echo 19, heart cath 19, Spirometry and impression of results from Dr Renee Oliveros 19, MRSA/MSSA nasal swab, UA. Current labs collected by Meeta Pickering PCT and blood bank bracelet was applied to R arm. POC Glucose:not indicated    Dr Angela Fonsecaph in to assess patient per anesthesia protocol. All cardiac records reviewed. No new orders received. Patient viewed preoperative heart teaching video. Pre op instructions and education sheets given and reviewed with patient:  Heart instructions, central line catheter infection prevention, ventilator education,  blood transfusion education, hand hygiene education, smoking cessation education, pain management education. Patient verbalizes understanding of all instructions. Patient brought incentive spirometer and demonstrates proficient use, and verbalizes understanding to bring incentive spirometer on day of surgery. Patient seen by respiratory therapist 19, and  FEV1 results on chart. Pt instructed on importance of handwashing for infection prevention. Pt verbalizes understanding to shower nightly with antibacterial soap & Hibiclens provided at pre assessment on the night prior to and morning of surgery. Instructed to turn water off and wash with HIBICLENS from chin to toes avoiding genitalia, then rinse after 1 minute. Pt verbalizes understanding to repeat this the morning of surgery. Pt verbalizes understanding to take listed medications ONLY on morning of surgery:aspirin 81 mg,  Metoprolol, mupirocin nasal ointment. Pt verbalizes understanding to bring NTG to the hospital DOS.     Pt verbalizes understanding to HOLD the  following medications:Plavix 7 days prior to surgery and ibuprofen 5 days prior to surgery--- hold verified by the patient. Pt verbalizes understanding to HOLD ALL VITAMINS AND SUPPLEMENTS FOR 7 DAYS PRIOR TO SURGERY DATE (with exception of renal vitamins). Pt verbalizes understanding to CONTINUE ALL OTHER MEDICATIONS AS PRESCRIBED UNTIL DAY OF SURGERY. Prescriptions called to patient's pharmacy: [facility] [place]  Patient given written and verbal instructions to obtain and instructions for use. Pt previously obtained mupirocin ointment 8/26/19 and verifies understanding of instructions to apply Mupirocin ointment with clean q-tips to each nostril twice daily and morning of surgery. Pt verbalizes understanding to obtain Amiodarone 200 mg 3 tablets and to take this evening after 4 pm--- One time dose with no refills. MRSA swab and clean catch urine results reviewed as stated above. Results of CXR and carotid ultrasound on chart. Labs drawn by [facility] PCT including  blood bank labs. [facility] blood bank wrist band placed on the patient's right wrist and pt verbalizes understanding not to remove the band until discharged from the hospital after surgery. Patient verbalizes understanding that arrival time will be called to patient on the weekday prior to surgery date and that patient must check phone messages. If patient has any questions regarding arrival times call [phone]. PT. INSTRUCTED TO CALL THE FOLLOWING NUMBERS IF ANY SAFETY CONCERNS BEFORE, DURING, OR AFTER HOSPITAL ENCOUNTER: PT. SAFETY LINE - [phone] OR PT. RELATIONS - [phone].

## 2019-09-03 NOTE — ANESTHESIA PREPROCEDURE EVALUATION
Relevant Problems   No relevant active problems       Anesthetic History     PONV          Review of Systems / Medical History  Patient summary reviewed and pertinent labs reviewed    Pulmonary          Smoker (Former)  Asthma : well controlled       Neuro/Psych       CVA (\"secondary to severe migraine\"): no residual symptoms  Headaches (Migraines) and psychiatric history     Cardiovascular    Hypertension          CAD and cardiac stents (LAD 2009)    Exercise tolerance: >4 METS: Intermittent chest tightness with activity   Comments: BAM 8/2019 -  EF 55-60%, normal RV function, severe aortic stenosis (mean gradient 59, peak velocity 4.83)  DI 0.15    LHC 8/2019 - no evidence of obstructive coronary artery disease          GI/Hepatic/Renal     GERD (\"valve does not open and close properly - requires sleeping with HOB elevated\"): poorly controlled           Endo/Other        Arthritis     Other Findings   Comments: Lymphoma         Physical Exam    Airway  Mallampati: II  TM Distance: 4 - 6 cm  Neck ROM: normal range of motion   Mouth opening: Normal     Cardiovascular    Rhythm: regular  Rate: normal    Murmur: Grade 3, Aortic area  Pertinent negatives: No JVD and peripheral edema   Dental    Dentition: Caps/crowns     Pulmonary  Breath sounds clear to auscultation               Abdominal  GI exam deferred       Other Findings            Anesthetic Plan    ASA: 4  Anesthesia type: general    Monitoring Plan: Arterial line, BIS, CVP, [place] and BAM      Induction: Intravenous  Anesthetic plan and risks discussed with: Patient and Spouse

## 2019-09-04 ENCOUNTER — ANESTHESIA (OUTPATIENT)
Dept: SURGERY | Age: 69
DRG: 220 | End: 2019-09-04
Payer: MEDICARE

## 2019-09-04 ENCOUNTER — APPOINTMENT (OUTPATIENT)
Dept: GENERAL RADIOLOGY | Age: 69
DRG: 220 | End: 2019-09-04
Attending: THORACIC SURGERY (CARDIOTHORACIC VASCULAR SURGERY)
Payer: MEDICARE

## 2019-09-04 ENCOUNTER — HOSPITAL ENCOUNTER (INPATIENT)
Age: 69
LOS: 5 days | Discharge: HOME HEALTH | DRG: 220 | End: 2019-09-09
Attending: THORACIC SURGERY (CARDIOTHORACIC VASCULAR SURGERY) | Admitting: THORACIC SURGERY (CARDIOTHORACIC VASCULAR SURGERY)
Payer: MEDICARE

## 2019-09-04 DIAGNOSIS — J45.20 MILD INTERMITTENT ASTHMA WITHOUT COMPLICATION: ICD-10-CM

## 2019-09-04 DIAGNOSIS — I35.0 AORTIC VALVE STENOSIS, ETIOLOGY OF CARDIAC VALVE DISEASE UNSPECIFIED: ICD-10-CM

## 2019-09-04 DIAGNOSIS — Z99.11 ENCOUNTER FOR WEANING FROM VENTILATOR (HCC): ICD-10-CM

## 2019-09-04 DIAGNOSIS — J98.11 ATELECTASIS, LEFT: ICD-10-CM

## 2019-09-04 DIAGNOSIS — Z95.2 S/P AVR (AORTIC VALVE REPLACEMENT): ICD-10-CM

## 2019-09-04 DIAGNOSIS — D62 ACUTE BLOOD LOSS ANEMIA: ICD-10-CM

## 2019-09-04 DIAGNOSIS — R06.2 WHEEZING: ICD-10-CM

## 2019-09-04 DIAGNOSIS — R09.02 HYPOXIA: ICD-10-CM

## 2019-09-04 DIAGNOSIS — R04.0 EPISTAXIS: ICD-10-CM

## 2019-09-04 LAB
ANION GAP SERPL CALC-SCNC: 7 MMOL/L (ref 7–16)
APTT PPP: 38.5 SEC (ref 24.7–39.8)
ARTERIAL PATENCY WRIST A: ABNORMAL
ATRIAL RATE: 84 BPM
BASE DEFICIT BLD-SCNC: 1 MMOL/L
BASE DEFICIT BLD-SCNC: 2 MMOL/L
BASE DEFICIT BLD-SCNC: 3 MMOL/L
BASE DEFICIT BLD-SCNC: 5 MMOL/L
BASE DEFICIT BLD-SCNC: 6 MMOL/L
BASE DEFICIT BLD-SCNC: 7 MMOL/L
BASE EXCESS BLD CALC-SCNC: 1 MMOL/L
BASE EXCESS BLD CALC-SCNC: 1 MMOL/L
BDY SITE: ABNORMAL
BODY TEMPERATURE: 98.6
BUN SERPL-MCNC: 12 MG/DL (ref 8–23)
CA-I BLD-MCNC: 1.08 MMOL/L (ref 1.12–1.32)
CA-I BLD-MCNC: 1.1 MMOL/L (ref 1.12–1.32)
CA-I BLD-MCNC: 1.15 MMOL/L (ref 1.12–1.32)
CA-I BLD-MCNC: 1.16 MMOL/L (ref 1.12–1.32)
CA-I BLD-MCNC: 1.19 MMOL/L (ref 1.12–1.32)
CA-I BLD-MCNC: 1.21 MMOL/L (ref 1.12–1.32)
CA-I BLD-MCNC: 1.28 MMOL/L (ref 1.12–1.32)
CA-I BLD-MCNC: 1.36 MMOL/L (ref 1.12–1.32)
CALCIUM SERPL-MCNC: 7.3 MG/DL (ref 8.3–10.4)
CALCULATED P AXIS, ECG09: 46 DEGREES
CALCULATED T AXIS, ECG11: -98 DEGREES
CHLORIDE SERPL-SCNC: 116 MMOL/L (ref 98–107)
CO2 BLD-SCNC: 23 MMOL/L
CO2 BLD-SCNC: 23 MMOL/L
CO2 SERPL-SCNC: 23 MMOL/L (ref 21–32)
COLLECT TIME,HTIME: 1136
COLLECT TIME,HTIME: 1630
COLLECT TIME,HTIME: 1750
COLLECT TIME,HTIME: 1925
CREAT SERPL-MCNC: 0.46 MG/DL (ref 0.6–1)
DIAGNOSIS, 93000: NORMAL
ERYTHROCYTE [DISTWIDTH] IN BLOOD BY AUTOMATED COUNT: 13.3 % (ref 11.9–14.6)
EXHALED MINUTE VOLUME, VE: 10.2 L/MIN
EXHALED MINUTE VOLUME, VE: 6 L/MIN
EXHALED MINUTE VOLUME, VE: 6.2 L/MIN
EXHALED MINUTE VOLUME, VE: 8.1 L/MIN
FIBRINOGEN PPP-MCNC: 282 MG/DL (ref 190–501)
GAS FLOW.O2 O2 DELIVERY SYS: ABNORMAL L/MIN
GAS FLOW.O2 SETTING OXYMISER: 16 BPM
GLUCOSE BLD STRIP.AUTO-MCNC: 102 MG/DL (ref 65–100)
GLUCOSE BLD STRIP.AUTO-MCNC: 103 MG/DL (ref 65–100)
GLUCOSE BLD STRIP.AUTO-MCNC: 103 MG/DL (ref 65–100)
GLUCOSE BLD STRIP.AUTO-MCNC: 106 MG/DL (ref 65–100)
GLUCOSE BLD STRIP.AUTO-MCNC: 107 MG/DL (ref 65–100)
GLUCOSE BLD STRIP.AUTO-MCNC: 112 MG/DL (ref 65–100)
GLUCOSE BLD STRIP.AUTO-MCNC: 113 MG/DL (ref 65–100)
GLUCOSE BLD STRIP.AUTO-MCNC: 116 MG/DL (ref 65–100)
GLUCOSE BLD STRIP.AUTO-MCNC: 127 MG/DL (ref 65–100)
GLUCOSE BLD STRIP.AUTO-MCNC: 141 MG/DL (ref 65–100)
GLUCOSE BLD STRIP.AUTO-MCNC: 143 MG/DL (ref 65–100)
GLUCOSE BLD STRIP.AUTO-MCNC: 171 MG/DL (ref 65–100)
GLUCOSE BLD STRIP.AUTO-MCNC: 173 MG/DL (ref 65–100)
GLUCOSE BLD STRIP.AUTO-MCNC: 176 MG/DL (ref 65–100)
GLUCOSE BLD STRIP.AUTO-MCNC: 95 MG/DL (ref 65–100)
GLUCOSE SERPL-MCNC: 126 MG/DL (ref 65–100)
HCO3 BLD-SCNC: 19.7 MMOL/L (ref 22–26)
HCO3 BLD-SCNC: 21.5 MMOL/L (ref 22–26)
HCO3 BLD-SCNC: 21.6 MMOL/L (ref 22–26)
HCO3 BLD-SCNC: 22.6 MMOL/L (ref 22–26)
HCO3 BLD-SCNC: 22.6 MMOL/L (ref 22–26)
HCO3 BLD-SCNC: 23.1 MMOL/L (ref 22–26)
HCO3 BLD-SCNC: 24 MMOL/L (ref 22–26)
HCO3 BLD-SCNC: 25.2 MMOL/L (ref 22–26)
HCO3 BLD-SCNC: 25.6 MMOL/L (ref 22–26)
HCO3 BLD-SCNC: 28 MMOL/L (ref 22–26)
HCT VFR BLD AUTO: 28.4 % (ref 35.8–46.3)
HCT VFR BLD AUTO: 28.5 % (ref 35.8–46.3)
HCT VFR BLD AUTO: 29.7 % (ref 35.8–46.3)
HGB BLD-MCNC: 8.6 G/DL (ref 11.7–15.4)
HGB BLD-MCNC: 8.6 G/DL (ref 11.7–15.4)
HGB BLD-MCNC: 9 G/DL (ref 11.7–15.4)
INR PPP: 1.5
INSPIRATION.DURATION SETTING TIME VENT: 0.9 SEC
MAGNESIUM SERPL-MCNC: 2.8 MG/DL (ref 1.8–2.4)
MAGNESIUM SERPL-MCNC: 2.9 MG/DL (ref 1.8–2.4)
MAGNESIUM SERPL-MCNC: 3.5 MG/DL (ref 1.8–2.4)
MCH RBC QN AUTO: 29 PG (ref 26.1–32.9)
MCHC RBC AUTO-ENTMCNC: 30.3 G/DL (ref 31.4–35)
MCV RBC AUTO: 95.8 FL (ref 79.6–97.8)
NRBC # BLD: 0 K/UL (ref 0–0.2)
O2/TOTAL GAS SETTING VFR VENT: 40 %
O2/TOTAL GAS SETTING VFR VENT: 40 %
O2/TOTAL GAS SETTING VFR VENT: 50 %
O2/TOTAL GAS SETTING VFR VENT: 60 %
P-R INTERVAL, ECG05: 182 MS
PCO2 BLD: 38.2 MMHG (ref 35–45)
PCO2 BLD: 40.5 MMHG (ref 35–45)
PCO2 BLD: 41.3 MMHG (ref 35–45)
PCO2 BLD: 42.2 MMHG (ref 35–45)
PCO2 BLD: 43 MMHG (ref 35–45)
PCO2 BLD: 43.6 MMHG (ref 35–45)
PCO2 BLD: 50.2 MMHG (ref 35–45)
PCO2 BLD: 50.6 MMHG (ref 35–45)
PCO2 BLD: 52 MMHG (ref 35–45)
PCO2 BLD: 54.9 MMHG (ref 35–45)
PEEP RESPIRATORY: 8 CMH2O
PH BLD: 7.24 [PH] (ref 7.35–7.45)
PH BLD: 7.27 [PH] (ref 7.35–7.45)
PH BLD: 7.29 [PH] (ref 7.35–7.45)
PH BLD: 7.3 [PH] (ref 7.35–7.45)
PH BLD: 7.3 [PH] (ref 7.35–7.45)
PH BLD: 7.32 [PH] (ref 7.35–7.45)
PH BLD: 7.34 [PH] (ref 7.35–7.45)
PH BLD: 7.35 [PH] (ref 7.35–7.45)
PH BLD: 7.36 [PH] (ref 7.35–7.45)
PH BLD: 7.43 [PH] (ref 7.35–7.45)
PLATELET # BLD AUTO: 170 K/UL (ref 150–450)
PMV BLD AUTO: 9 FL (ref 9.4–12.3)
PO2 BLD: 127 MMHG (ref 75–100)
PO2 BLD: 127 MMHG (ref 75–100)
PO2 BLD: 152 MMHG (ref 75–100)
PO2 BLD: 185 MMHG (ref 75–100)
PO2 BLD: 220 MMHG (ref 75–100)
PO2 BLD: 228 MMHG (ref 75–100)
PO2 BLD: 249 MMHG (ref 75–100)
PO2 BLD: 269 MMHG (ref 75–100)
PO2 BLD: 337 MMHG (ref 75–100)
PO2 BLD: 356 MMHG (ref 75–100)
POTASSIUM BLD-SCNC: 3.2 MMOL/L (ref 3.5–5.1)
POTASSIUM BLD-SCNC: 3.6 MMOL/L (ref 3.5–5.1)
POTASSIUM BLD-SCNC: 3.6 MMOL/L (ref 3.5–5.1)
POTASSIUM BLD-SCNC: 3.8 MMOL/L (ref 3.5–5.1)
POTASSIUM BLD-SCNC: 4.2 MMOL/L (ref 3.5–5.1)
POTASSIUM BLD-SCNC: 4.3 MMOL/L (ref 3.5–5.1)
POTASSIUM BLD-SCNC: 4.4 MMOL/L (ref 3.5–5.1)
POTASSIUM BLD-SCNC: 4.7 MMOL/L (ref 3.5–5.1)
POTASSIUM SERPL-SCNC: 3.4 MMOL/L (ref 3.5–5.1)
POTASSIUM SERPL-SCNC: 4.3 MMOL/L (ref 3.5–5.1)
POTASSIUM SERPL-SCNC: 4.6 MMOL/L (ref 3.5–5.1)
PRESSURE SUPPORT SETTING VENT: 12 CMH2O
PRESSURE SUPPORT SETTING VENT: 8 CMH2O
PROTHROMBIN TIME: 18.4 SEC (ref 11.7–14.5)
Q-T INTERVAL, ECG07: 456 MS
QRS DURATION, ECG06: 96 MS
QTC CALCULATION (BEZET), ECG08: 538 MS
RBC # BLD AUTO: 3.1 M/UL (ref 4.05–5.2)
SAO2 % BLD: 100 % (ref 95–98)
SAO2 % BLD: 98 % (ref 95–98)
SAO2 % BLD: 98 % (ref 95–98)
SAO2 % BLD: 99 % (ref 95–98)
SAO2 % BLD: 99 % (ref 95–98)
SERVICE CMNT-IMP: ABNORMAL
SODIUM BLD-SCNC: 138 MMOL/L (ref 136–145)
SODIUM BLD-SCNC: 139 MMOL/L (ref 136–145)
SODIUM BLD-SCNC: 140 MMOL/L (ref 136–145)
SODIUM BLD-SCNC: 140 MMOL/L (ref 136–145)
SODIUM BLD-SCNC: 141 MMOL/L (ref 136–145)
SODIUM BLD-SCNC: 141 MMOL/L (ref 136–145)
SODIUM BLD-SCNC: 144 MMOL/L (ref 136–145)
SODIUM BLD-SCNC: 146 MMOL/L (ref 136–145)
SODIUM SERPL-SCNC: 146 MMOL/L (ref 136–145)
SPECIMEN TYPE: ABNORMAL
TOTAL RESP. RATE, ITRR: 14
TOTAL RESP. RATE, ITRR: 18
VENTILATION MODE VENT: ABNORMAL
VENTRICULAR RATE, ECG03: 84 BPM
VT SETTING VENT: 450 ML
WBC # BLD AUTO: 19.7 K/UL (ref 4.3–11.1)

## 2019-09-04 PROCEDURE — 74011636637 HC RX REV CODE- 636/637

## 2019-09-04 PROCEDURE — 77030018836 HC SOL IRR NACL ICUM -A: Performed by: THORACIC SURGERY (CARDIOTHORACIC VASCULAR SURGERY)

## 2019-09-04 PROCEDURE — 77030002966 HC SUT PDS J&J -A: Performed by: THORACIC SURGERY (CARDIOTHORACIC VASCULAR SURGERY)

## 2019-09-04 PROCEDURE — 82803 BLOOD GASES ANY COMBINATION: CPT

## 2019-09-04 PROCEDURE — 76010000201 HC CV SURG 3.5 TO 4 HR INTENSV-TIER 1: Performed by: THORACIC SURGERY (CARDIOTHORACIC VASCULAR SURGERY)

## 2019-09-04 PROCEDURE — 71045 X-RAY EXAM CHEST 1 VIEW: CPT

## 2019-09-04 PROCEDURE — 77030005537 HC CATH URETH BARD -A: Performed by: THORACIC SURGERY (CARDIOTHORACIC VASCULAR SURGERY)

## 2019-09-04 PROCEDURE — 74011250637 HC RX REV CODE- 250/637: Performed by: PHYSICIAN ASSISTANT

## 2019-09-04 PROCEDURE — 85384 FIBRINOGEN ACTIVITY: CPT

## 2019-09-04 PROCEDURE — 82947 ASSAY GLUCOSE BLOOD QUANT: CPT

## 2019-09-04 PROCEDURE — 77030008771 HC TU NG SALEM SUMP -A: Performed by: NURSE ANESTHETIST, CERTIFIED REGISTERED

## 2019-09-04 PROCEDURE — 74011000250 HC RX REV CODE- 250: Performed by: THORACIC SURGERY (CARDIOTHORACIC VASCULAR SURGERY)

## 2019-09-04 PROCEDURE — 77030002520 HC INSRT CLMP LATIS STLTH AMR -B: Performed by: THORACIC SURGERY (CARDIOTHORACIC VASCULAR SURGERY)

## 2019-09-04 PROCEDURE — 77030005401 HC CATH RAD ARRO -A: Performed by: NURSE ANESTHETIST, CERTIFIED REGISTERED

## 2019-09-04 PROCEDURE — 74011250636 HC RX REV CODE- 250/636: Performed by: ANESTHESIOLOGY

## 2019-09-04 PROCEDURE — 77030025646 HC AUTOTRNSFUS KT TERU -C: Performed by: THORACIC SURGERY (CARDIOTHORACIC VASCULAR SURGERY)

## 2019-09-04 PROCEDURE — 74011000302 HC RX REV CODE- 302: Performed by: THORACIC SURGERY (CARDIOTHORACIC VASCULAR SURGERY)

## 2019-09-04 PROCEDURE — 77030002888 HC SUT CHRMC J&J -A: Performed by: THORACIC SURGERY (CARDIOTHORACIC VASCULAR SURGERY)

## 2019-09-04 PROCEDURE — 74011000258 HC RX REV CODE- 258

## 2019-09-04 PROCEDURE — 77030003010 HC SUT SURG STL J&J -B: Performed by: THORACIC SURGERY (CARDIOTHORACIC VASCULAR SURGERY)

## 2019-09-04 PROCEDURE — P9045 ALBUMIN (HUMAN), 5%, 250 ML: HCPCS

## 2019-09-04 PROCEDURE — 77030013292 HC BOWL MX PRSM J&J -A: Performed by: NURSE ANESTHETIST, CERTIFIED REGISTERED

## 2019-09-04 PROCEDURE — 03HY32Z INSERTION OF MONITORING DEVICE INTO UPPER ARTERY, PERCUTANEOUS APPROACH: ICD-10-PCS | Performed by: ANESTHESIOLOGY

## 2019-09-04 PROCEDURE — 77030018793 HC ORG SUT GAB FRAT TELE -B: Performed by: THORACIC SURGERY (CARDIOTHORACIC VASCULAR SURGERY)

## 2019-09-04 PROCEDURE — 74011000250 HC RX REV CODE- 250

## 2019-09-04 PROCEDURE — 77030005518 HC CATH URETH FOL 2W BARD -B: Performed by: THORACIC SURGERY (CARDIOTHORACIC VASCULAR SURGERY)

## 2019-09-04 PROCEDURE — 77030002996 HC SUT SLK J&J -A: Performed by: THORACIC SURGERY (CARDIOTHORACIC VASCULAR SURGERY)

## 2019-09-04 PROCEDURE — 77030002970 HC SUT PLEDG TELE -A: Performed by: THORACIC SURGERY (CARDIOTHORACIC VASCULAR SURGERY)

## 2019-09-04 PROCEDURE — P9047 ALBUMIN (HUMAN), 25%, 50ML: HCPCS

## 2019-09-04 PROCEDURE — 77030020782 HC GWN BAIR PAWS FLX 3M -B: Performed by: NURSE ANESTHETIST, CERTIFIED REGISTERED

## 2019-09-04 PROCEDURE — 77030016688: Performed by: THORACIC SURGERY (CARDIOTHORACIC VASCULAR SURGERY)

## 2019-09-04 PROCEDURE — 82962 GLUCOSE BLOOD TEST: CPT

## 2019-09-04 PROCEDURE — 85610 PROTHROMBIN TIME: CPT

## 2019-09-04 PROCEDURE — 85018 HEMOGLOBIN: CPT

## 2019-09-04 PROCEDURE — 85027 COMPLETE CBC AUTOMATED: CPT

## 2019-09-04 PROCEDURE — 77030033070 HC RLD QLC FPCH COR-KNOT LSIS -C: Performed by: THORACIC SURGERY (CARDIOTHORACIC VASCULAR SURGERY)

## 2019-09-04 PROCEDURE — 74011250636 HC RX REV CODE- 250/636: Performed by: THORACIC SURGERY (CARDIOTHORACIC VASCULAR SURGERY)

## 2019-09-04 PROCEDURE — 77030020263 HC SOL INJ SOD CL0.9% LFCR 1000ML

## 2019-09-04 PROCEDURE — 76060000039 HC ANESTHESIA 4 TO 4.5 HR: Performed by: THORACIC SURGERY (CARDIOTHORACIC VASCULAR SURGERY)

## 2019-09-04 PROCEDURE — 77030027138 HC INCENT SPIROMETER -A

## 2019-09-04 PROCEDURE — 77030018548 HC SUT ETHBND2 J&J -B: Performed by: THORACIC SURGERY (CARDIOTHORACIC VASCULAR SURGERY)

## 2019-09-04 PROCEDURE — 74011250636 HC RX REV CODE- 250/636

## 2019-09-04 PROCEDURE — 77030025827 HC BG BLD DNR AUTLG MEDT -A: Performed by: THORACIC SURGERY (CARDIOTHORACIC VASCULAR SURGERY)

## 2019-09-04 PROCEDURE — 85730 THROMBOPLASTIN TIME PARTIAL: CPT

## 2019-09-04 PROCEDURE — 74011000272 HC RX REV CODE- 272: Performed by: THORACIC SURGERY (CARDIOTHORACIC VASCULAR SURGERY)

## 2019-09-04 PROCEDURE — 77030003034 HC SUT WRE CRD J&J -B: Performed by: THORACIC SURGERY (CARDIOTHORACIC VASCULAR SURGERY)

## 2019-09-04 PROCEDURE — 76010000155 HC AUTO TRANSFUSION/CELL SAVER: Performed by: THORACIC SURGERY (CARDIOTHORACIC VASCULAR SURGERY)

## 2019-09-04 PROCEDURE — 86580 TB INTRADERMAL TEST: CPT | Performed by: THORACIC SURGERY (CARDIOTHORACIC VASCULAR SURGERY)

## 2019-09-04 PROCEDURE — 36600 WITHDRAWAL OF ARTERIAL BLOOD: CPT

## 2019-09-04 PROCEDURE — 77030034927 HC PK PROC CPB INSPIRE PERF LIVA -F: Performed by: THORACIC SURGERY (CARDIOTHORACIC VASCULAR SURGERY)

## 2019-09-04 PROCEDURE — 77030020751 HC FLTR TBNG TRNSFUS HAEM -A: Performed by: NURSE ANESTHETIST, CERTIFIED REGISTERED

## 2019-09-04 PROCEDURE — 77030002986 HC SUT PROL J&J -A: Performed by: THORACIC SURGERY (CARDIOTHORACIC VASCULAR SURGERY)

## 2019-09-04 PROCEDURE — 02RF08Z REPLACEMENT OF AORTIC VALVE WITH ZOOPLASTIC TISSUE, OPEN APPROACH: ICD-10-PCS | Performed by: THORACIC SURGERY (CARDIOTHORACIC VASCULAR SURGERY)

## 2019-09-04 PROCEDURE — 77030018792 HC NDL SUT TFSH -A: Performed by: THORACIC SURGERY (CARDIOTHORACIC VASCULAR SURGERY)

## 2019-09-04 PROCEDURE — 77030020269 HC MISC IMPL: Performed by: THORACIC SURGERY (CARDIOTHORACIC VASCULAR SURGERY)

## 2019-09-04 PROCEDURE — 77030020751 HC FLTR TBNG TRNSFUS HAEM -A: Performed by: THORACIC SURGERY (CARDIOTHORACIC VASCULAR SURGERY)

## 2019-09-04 PROCEDURE — 74011250637 HC RX REV CODE- 250/637: Performed by: THORACIC SURGERY (CARDIOTHORACIC VASCULAR SURGERY)

## 2019-09-04 PROCEDURE — B24BZZ4 ULTRASONOGRAPHY OF HEART WITH AORTA, TRANSESOPHAGEAL: ICD-10-PCS | Performed by: ANESTHESIOLOGY

## 2019-09-04 PROCEDURE — P9045 ALBUMIN (HUMAN), 5%, 250 ML: HCPCS | Performed by: THORACIC SURGERY (CARDIOTHORACIC VASCULAR SURGERY)

## 2019-09-04 PROCEDURE — 93005 ELECTROCARDIOGRAM TRACING: CPT | Performed by: THORACIC SURGERY (CARDIOTHORACIC VASCULAR SURGERY)

## 2019-09-04 PROCEDURE — 65610000006 HC RM INTENSIVE CARE

## 2019-09-04 PROCEDURE — 77030037088 HC TUBE ENDOTRACH ORAL NSL COVD-A: Performed by: NURSE ANESTHETIST, CERTIFIED REGISTERED

## 2019-09-04 PROCEDURE — 84132 ASSAY OF SERUM POTASSIUM: CPT

## 2019-09-04 PROCEDURE — C1769 GUIDE WIRE: HCPCS | Performed by: THORACIC SURGERY (CARDIOTHORACIC VASCULAR SURGERY)

## 2019-09-04 PROCEDURE — 80048 BASIC METABOLIC PNL TOTAL CA: CPT

## 2019-09-04 PROCEDURE — 77030012890

## 2019-09-04 PROCEDURE — 74011250637 HC RX REV CODE- 250/637

## 2019-09-04 PROCEDURE — 77030034936 HC DEV MIN COR-KNOT KT LSIS -F: Performed by: THORACIC SURGERY (CARDIOTHORACIC VASCULAR SURGERY)

## 2019-09-04 PROCEDURE — 74011250636 HC RX REV CODE- 250/636: Performed by: PHYSICIAN ASSISTANT

## 2019-09-04 PROCEDURE — 88305 TISSUE EXAM BY PATHOLOGIST: CPT

## 2019-09-04 PROCEDURE — 02HV33Z INSERTION OF INFUSION DEVICE INTO SUPERIOR VENA CAVA, PERCUTANEOUS APPROACH: ICD-10-PCS | Performed by: ANESTHESIOLOGY

## 2019-09-04 PROCEDURE — 77030012390 HC DRN CHST BTL GTNG -B: Performed by: THORACIC SURGERY (CARDIOTHORACIC VASCULAR SURGERY)

## 2019-09-04 PROCEDURE — C1751 CATH, INF, PER/CENT/MIDLINE: HCPCS | Performed by: NURSE ANESTHETIST, CERTIFIED REGISTERED

## 2019-09-04 PROCEDURE — 77030016564 HC BLD STRNL SAW4 CNMD -B: Performed by: THORACIC SURGERY (CARDIOTHORACIC VASCULAR SURGERY)

## 2019-09-04 PROCEDURE — 99223 1ST HOSP IP/OBS HIGH 75: CPT | Performed by: INTERNAL MEDICINE

## 2019-09-04 PROCEDURE — 77030010827: Performed by: THORACIC SURGERY (CARDIOTHORACIC VASCULAR SURGERY)

## 2019-09-04 PROCEDURE — C1729 CATH, DRAINAGE: HCPCS | Performed by: THORACIC SURGERY (CARDIOTHORACIC VASCULAR SURGERY)

## 2019-09-04 PROCEDURE — 77030020407 HC IV BLD WRMR ST 3M -A: Performed by: NURSE ANESTHETIST, CERTIFIED REGISTERED

## 2019-09-04 PROCEDURE — 94002 VENT MGMT INPAT INIT DAY: CPT

## 2019-09-04 PROCEDURE — 77030019908 HC STETH ESOPH SIMS -A: Performed by: NURSE ANESTHETIST, CERTIFIED REGISTERED

## 2019-09-04 PROCEDURE — 77030014007 HC SPNG HEMSTAT J&J -B: Performed by: THORACIC SURGERY (CARDIOTHORACIC VASCULAR SURGERY)

## 2019-09-04 PROCEDURE — 77030018547 HC SUT ETHBND1 J&J -B: Performed by: THORACIC SURGERY (CARDIOTHORACIC VASCULAR SURGERY)

## 2019-09-04 PROCEDURE — 83735 ASSAY OF MAGNESIUM: CPT

## 2019-09-04 PROCEDURE — 77030003422 HC NDL ASPIR NOVO -A: Performed by: THORACIC SURGERY (CARDIOTHORACIC VASCULAR SURGERY)

## 2019-09-04 PROCEDURE — 77030031139 HC SUT VCRL2 J&J -A: Performed by: THORACIC SURGERY (CARDIOTHORACIC VASCULAR SURGERY)

## 2019-09-04 PROCEDURE — 77030039425 HC BLD LARYNG TRULITE DISP TELE -A: Performed by: NURSE ANESTHETIST, CERTIFIED REGISTERED

## 2019-09-04 PROCEDURE — 77030018729 HC ELECTRD DEFIB PAD CARD -B: Performed by: THORACIC SURGERY (CARDIOTHORACIC VASCULAR SURGERY)

## 2019-09-04 PROCEDURE — 77030003037 HC SUT WRE STRNOTMY AEMC -B: Performed by: THORACIC SURGERY (CARDIOTHORACIC VASCULAR SURGERY)

## 2019-09-04 PROCEDURE — 77030013797 HC KT TRNSDUC PRSSR EDWD -A: Performed by: THORACIC SURGERY (CARDIOTHORACIC VASCULAR SURGERY)

## 2019-09-04 PROCEDURE — 77030013794 HC KT TRNSDUC BLD EDWD -B: Performed by: NURSE ANESTHETIST, CERTIFIED REGISTERED

## 2019-09-04 RX ORDER — ONDANSETRON 2 MG/ML
4 INJECTION INTRAMUSCULAR; INTRAVENOUS
Status: DISCONTINUED | OUTPATIENT
Start: 2019-09-04 | End: 2019-09-09 | Stop reason: HOSPADM

## 2019-09-04 RX ORDER — PROPOFOL 10 MG/ML
0-50 VIAL (ML) INTRAVENOUS
Status: DISCONTINUED | OUTPATIENT
Start: 2019-09-04 | End: 2019-09-05

## 2019-09-04 RX ORDER — FENTANYL CITRATE 50 UG/ML
INJECTION, SOLUTION INTRAMUSCULAR; INTRAVENOUS AS NEEDED
Status: DISCONTINUED | OUTPATIENT
Start: 2019-09-04 | End: 2019-09-04 | Stop reason: HOSPADM

## 2019-09-04 RX ORDER — PHENYLEPHRINE HCL IN 0.9% NACL 30MG/250ML
10-100 PLASTIC BAG, INJECTION (ML) INTRAVENOUS
Status: DISCONTINUED | OUTPATIENT
Start: 2019-09-04 | End: 2019-09-05

## 2019-09-04 RX ORDER — HEPARIN SODIUM 1000 [USP'U]/ML
INJECTION, SOLUTION INTRAVENOUS; SUBCUTANEOUS AS NEEDED
Status: DISCONTINUED | OUTPATIENT
Start: 2019-09-04 | End: 2019-09-04 | Stop reason: HOSPADM

## 2019-09-04 RX ORDER — MIDAZOLAM HYDROCHLORIDE 1 MG/ML
INJECTION, SOLUTION INTRAMUSCULAR; INTRAVENOUS AS NEEDED
Status: DISCONTINUED | OUTPATIENT
Start: 2019-09-04 | End: 2019-09-04 | Stop reason: HOSPADM

## 2019-09-04 RX ORDER — DEXTROSE 50 % IN WATER (D50W) INTRAVENOUS SYRINGE
25 AS NEEDED
Status: DISCONTINUED | OUTPATIENT
Start: 2019-09-04 | End: 2019-09-05

## 2019-09-04 RX ORDER — SODIUM CHLORIDE, SODIUM LACTATE, POTASSIUM CHLORIDE, CALCIUM CHLORIDE 600; 310; 30; 20 MG/100ML; MG/100ML; MG/100ML; MG/100ML
INJECTION, SOLUTION INTRAVENOUS
Status: DISCONTINUED | OUTPATIENT
Start: 2019-09-04 | End: 2019-09-04 | Stop reason: HOSPADM

## 2019-09-04 RX ORDER — NALOXONE HYDROCHLORIDE 0.4 MG/ML
0.2 INJECTION, SOLUTION INTRAMUSCULAR; INTRAVENOUS; SUBCUTANEOUS
Status: DISCONTINUED | OUTPATIENT
Start: 2019-09-04 | End: 2019-09-04 | Stop reason: HOSPADM

## 2019-09-04 RX ORDER — LIDOCAINE HYDROCHLORIDE 20 MG/ML
INJECTION, SOLUTION INFILTRATION; PERINEURAL AS NEEDED
Status: DISCONTINUED | OUTPATIENT
Start: 2019-09-04 | End: 2019-09-04 | Stop reason: HOSPADM

## 2019-09-04 RX ORDER — MUPIROCIN 20 MG/G
OINTMENT TOPICAL 2 TIMES DAILY
Status: COMPLETED | OUTPATIENT
Start: 2019-09-04 | End: 2019-09-07

## 2019-09-04 RX ORDER — SODIUM CHLORIDE 9 MG/ML
INJECTION, SOLUTION INTRAVENOUS
Status: DISCONTINUED | OUTPATIENT
Start: 2019-09-04 | End: 2019-09-04 | Stop reason: HOSPADM

## 2019-09-04 RX ORDER — NALOXONE HYDROCHLORIDE 0.4 MG/ML
0.4 INJECTION, SOLUTION INTRAMUSCULAR; INTRAVENOUS; SUBCUTANEOUS AS NEEDED
Status: DISCONTINUED | OUTPATIENT
Start: 2019-09-04 | End: 2019-09-05

## 2019-09-04 RX ORDER — MORPHINE SULFATE 10 MG/ML
3-5 INJECTION, SOLUTION INTRAMUSCULAR; INTRAVENOUS
Status: DISCONTINUED | OUTPATIENT
Start: 2019-09-04 | End: 2019-09-05

## 2019-09-04 RX ORDER — VECURONIUM BROMIDE FOR INJECTION 1 MG/ML
INJECTION, POWDER, LYOPHILIZED, FOR SOLUTION INTRAVENOUS AS NEEDED
Status: DISCONTINUED | OUTPATIENT
Start: 2019-09-04 | End: 2019-09-04 | Stop reason: HOSPADM

## 2019-09-04 RX ORDER — CHLORHEXIDINE GLUCONATE 1.2 MG/ML
10 RINSE ORAL 2 TIMES DAILY
Status: DISCONTINUED | OUTPATIENT
Start: 2019-09-04 | End: 2019-09-05

## 2019-09-04 RX ORDER — SODIUM CHLORIDE, SODIUM LACTATE, POTASSIUM CHLORIDE, CALCIUM CHLORIDE 600; 310; 30; 20 MG/100ML; MG/100ML; MG/100ML; MG/100ML
25 INJECTION, SOLUTION INTRAVENOUS CONTINUOUS
Status: DISCONTINUED | OUTPATIENT
Start: 2019-09-04 | End: 2019-09-04 | Stop reason: HOSPADM

## 2019-09-04 RX ORDER — ONDANSETRON 2 MG/ML
INJECTION INTRAMUSCULAR; INTRAVENOUS
Status: ACTIVE
Start: 2019-09-04 | End: 2019-09-05

## 2019-09-04 RX ORDER — DEXTROSE, SODIUM CHLORIDE, AND POTASSIUM CHLORIDE 5; .45; .15 G/100ML; G/100ML; G/100ML
25 INJECTION INTRAVENOUS CONTINUOUS
Status: DISCONTINUED | OUTPATIENT
Start: 2019-09-04 | End: 2019-09-05

## 2019-09-04 RX ORDER — SODIUM CHLORIDE 9 MG/ML
25 INJECTION, SOLUTION INTRAVENOUS CONTINUOUS
Status: DISCONTINUED | OUTPATIENT
Start: 2019-09-04 | End: 2019-09-05

## 2019-09-04 RX ORDER — METOPROLOL TARTRATE 25 MG/1
25 TABLET, FILM COATED ORAL EVERY 12 HOURS
Status: DISCONTINUED | OUTPATIENT
Start: 2019-09-05 | End: 2019-09-09 | Stop reason: HOSPADM

## 2019-09-04 RX ORDER — ONDANSETRON 2 MG/ML
4 INJECTION INTRAMUSCULAR; INTRAVENOUS
Status: DISCONTINUED | OUTPATIENT
Start: 2019-09-04 | End: 2019-09-04 | Stop reason: HOSPADM

## 2019-09-04 RX ORDER — CEFAZOLIN SODIUM/WATER 2 G/20 ML
2 SYRINGE (ML) INTRAVENOUS ONCE
Status: COMPLETED | OUTPATIENT
Start: 2019-09-04 | End: 2019-09-04

## 2019-09-04 RX ORDER — GUAIFENESIN 100 MG/5ML
81 LIQUID (ML) ORAL DAILY
Status: DISCONTINUED | OUTPATIENT
Start: 2019-09-05 | End: 2019-09-09 | Stop reason: HOSPADM

## 2019-09-04 RX ORDER — EPHEDRINE SULFATE 50 MG/ML
INJECTION, SOLUTION INTRAVENOUS AS NEEDED
Status: DISCONTINUED | OUTPATIENT
Start: 2019-09-04 | End: 2019-09-04 | Stop reason: HOSPADM

## 2019-09-04 RX ORDER — ALBUMIN HUMAN 50 G/1000ML
25 SOLUTION INTRAVENOUS ONCE
Status: COMPLETED | OUTPATIENT
Start: 2019-09-04 | End: 2019-09-04

## 2019-09-04 RX ORDER — NITROGLYCERIN 20 MG/100ML
10-100 INJECTION INTRAVENOUS
Status: DISCONTINUED | OUTPATIENT
Start: 2019-09-04 | End: 2019-09-05

## 2019-09-04 RX ORDER — SODIUM CHLORIDE 0.9 % (FLUSH) 0.9 %
5-40 SYRINGE (ML) INJECTION AS NEEDED
Status: DISCONTINUED | OUTPATIENT
Start: 2019-09-04 | End: 2019-09-09 | Stop reason: HOSPADM

## 2019-09-04 RX ORDER — MIDAZOLAM HYDROCHLORIDE 1 MG/ML
1 INJECTION, SOLUTION INTRAMUSCULAR; INTRAVENOUS
Status: DISCONTINUED | OUTPATIENT
Start: 2019-09-04 | End: 2019-09-05

## 2019-09-04 RX ORDER — SODIUM BICARBONATE 84 MG/ML
50 INJECTION, SOLUTION INTRAVENOUS AS NEEDED
Status: DISCONTINUED | OUTPATIENT
Start: 2019-09-04 | End: 2019-09-05

## 2019-09-04 RX ORDER — ATORVASTATIN CALCIUM 40 MG/1
80 TABLET, FILM COATED ORAL
Status: DISCONTINUED | OUTPATIENT
Start: 2019-09-04 | End: 2019-09-06

## 2019-09-04 RX ORDER — OXYMETAZOLINE HCL 0.05 %
SPRAY, NON-AEROSOL (ML) NASAL AS NEEDED
Status: DISCONTINUED | OUTPATIENT
Start: 2019-09-04 | End: 2019-09-04 | Stop reason: HOSPADM

## 2019-09-04 RX ORDER — FENTANYL CITRATE 50 UG/ML
100 INJECTION, SOLUTION INTRAMUSCULAR; INTRAVENOUS ONCE
Status: DISCONTINUED | OUTPATIENT
Start: 2019-09-04 | End: 2019-09-04 | Stop reason: HOSPADM

## 2019-09-04 RX ORDER — AMIODARONE HYDROCHLORIDE 200 MG/1
200 TABLET ORAL EVERY 12 HOURS
Status: DISCONTINUED | OUTPATIENT
Start: 2019-09-04 | End: 2019-09-09 | Stop reason: HOSPADM

## 2019-09-04 RX ORDER — AMIODARONE HYDROCHLORIDE 200 MG/1
600 TABLET ORAL ONCE
Status: COMPLETED | OUTPATIENT
Start: 2019-09-04 | End: 2019-09-04

## 2019-09-04 RX ORDER — MIDAZOLAM HYDROCHLORIDE 1 MG/ML
2 INJECTION, SOLUTION INTRAMUSCULAR; INTRAVENOUS
Status: DISCONTINUED | OUTPATIENT
Start: 2019-09-04 | End: 2019-09-04 | Stop reason: HOSPADM

## 2019-09-04 RX ORDER — POTASSIUM CHLORIDE 14.9 MG/ML
10 INJECTION INTRAVENOUS AS NEEDED
Status: DISCONTINUED | OUTPATIENT
Start: 2019-09-04 | End: 2019-09-05

## 2019-09-04 RX ORDER — ALBUMIN HUMAN 50 G/1000ML
SOLUTION INTRAVENOUS AS NEEDED
Status: DISCONTINUED | OUTPATIENT
Start: 2019-09-04 | End: 2019-09-04 | Stop reason: HOSPADM

## 2019-09-04 RX ORDER — CEFAZOLIN SODIUM/WATER 2 G/20 ML
2 SYRINGE (ML) INTRAVENOUS EVERY 8 HOURS
Status: COMPLETED | OUTPATIENT
Start: 2019-09-04 | End: 2019-09-05

## 2019-09-04 RX ORDER — SODIUM CHLORIDE 0.9 % (FLUSH) 0.9 %
5-40 SYRINGE (ML) INJECTION EVERY 8 HOURS
Status: DISCONTINUED | OUTPATIENT
Start: 2019-09-04 | End: 2019-09-09 | Stop reason: HOSPADM

## 2019-09-04 RX ORDER — ROCURONIUM BROMIDE 10 MG/ML
INJECTION, SOLUTION INTRAVENOUS AS NEEDED
Status: DISCONTINUED | OUTPATIENT
Start: 2019-09-04 | End: 2019-09-04 | Stop reason: HOSPADM

## 2019-09-04 RX ORDER — NOREPINEPHRINE BIT/0.9 % NACL 4MG/250ML
.01-.2 PLASTIC BAG, INJECTION (ML) INTRAVENOUS
Status: DISCONTINUED | OUTPATIENT
Start: 2019-09-04 | End: 2019-09-05

## 2019-09-04 RX ORDER — LIDOCAINE HYDROCHLORIDE 10 MG/ML
0.1 INJECTION INFILTRATION; PERINEURAL AS NEEDED
Status: DISCONTINUED | OUTPATIENT
Start: 2019-09-04 | End: 2019-09-04 | Stop reason: HOSPADM

## 2019-09-04 RX ORDER — MAGNESIUM SULFATE 1 G/100ML
1 INJECTION INTRAVENOUS AS NEEDED
Status: DISCONTINUED | OUTPATIENT
Start: 2019-09-04 | End: 2019-09-05

## 2019-09-04 RX ORDER — AMIODARONE HYDROCHLORIDE 150 MG/3ML
INJECTION, SOLUTION INTRAVENOUS AS NEEDED
Status: DISCONTINUED | OUTPATIENT
Start: 2019-09-04 | End: 2019-09-04 | Stop reason: HOSPADM

## 2019-09-04 RX ORDER — MIDAZOLAM HYDROCHLORIDE 1 MG/ML
2 INJECTION, SOLUTION INTRAMUSCULAR; INTRAVENOUS ONCE
Status: DISCONTINUED | OUTPATIENT
Start: 2019-09-04 | End: 2019-09-04 | Stop reason: HOSPADM

## 2019-09-04 RX ORDER — OXYCODONE AND ACETAMINOPHEN 5; 325 MG/1; MG/1
1 TABLET ORAL
Status: DISCONTINUED | OUTPATIENT
Start: 2019-09-04 | End: 2019-09-05 | Stop reason: SDUPTHER

## 2019-09-04 RX ORDER — LIDOCAINE HCL/PF 100 MG/5ML
50-100 SYRINGE (ML) INTRAVENOUS
Status: ACTIVE | OUTPATIENT
Start: 2019-09-04 | End: 2019-09-05

## 2019-09-04 RX ORDER — PROTAMINE SULFATE 10 MG/ML
INJECTION, SOLUTION INTRAVENOUS AS NEEDED
Status: DISCONTINUED | OUTPATIENT
Start: 2019-09-04 | End: 2019-09-04 | Stop reason: HOSPADM

## 2019-09-04 RX ADMIN — Medication 10 ML: at 16:06

## 2019-09-04 RX ADMIN — SODIUM CHLORIDE: 9 INJECTION, SOLUTION INTRAVENOUS at 08:15

## 2019-09-04 RX ADMIN — EPHEDRINE SULFATE 2 MG: 50 INJECTION, SOLUTION INTRAVENOUS at 08:23

## 2019-09-04 RX ADMIN — SODIUM CHLORIDE: 9 INJECTION, SOLUTION INTRAVENOUS at 11:15

## 2019-09-04 RX ADMIN — PROTAMINE SULFATE 150 MG: 10 INJECTION, SOLUTION INTRAVENOUS at 10:25

## 2019-09-04 RX ADMIN — EPHEDRINE SULFATE 3 MG: 50 INJECTION, SOLUTION INTRAVENOUS at 08:27

## 2019-09-04 RX ADMIN — LIDOCAINE HYDROCHLORIDE 100 MG: 20 INJECTION, SOLUTION INFILTRATION; PERINEURAL at 07:46

## 2019-09-04 RX ADMIN — Medication 2 G: at 11:33

## 2019-09-04 RX ADMIN — POTASSIUM CHLORIDE 10 MEQ: 200 INJECTION, SOLUTION INTRAVENOUS at 14:40

## 2019-09-04 RX ADMIN — FENTANYL CITRATE 200 MCG: 50 INJECTION, SOLUTION INTRAMUSCULAR; INTRAVENOUS at 08:41

## 2019-09-04 RX ADMIN — Medication 2 SPRAY: at 10:53

## 2019-09-04 RX ADMIN — Medication 2 G: at 08:35

## 2019-09-04 RX ADMIN — MUPIROCIN: 20 OINTMENT TOPICAL at 20:33

## 2019-09-04 RX ADMIN — EPHEDRINE SULFATE 2.5 MG: 50 INJECTION, SOLUTION INTRAVENOUS at 08:09

## 2019-09-04 RX ADMIN — AMIODARONE HYDROCHLORIDE 600 MG: 200 TABLET ORAL at 05:40

## 2019-09-04 RX ADMIN — EPHEDRINE SULFATE 2.5 MG: 50 INJECTION, SOLUTION INTRAVENOUS at 08:48

## 2019-09-04 RX ADMIN — OXYCODONE HYDROCHLORIDE AND ACETAMINOPHEN 1 TABLET: 5; 325 TABLET ORAL at 20:51

## 2019-09-04 RX ADMIN — ONDANSETRON 4 MG: 2 INJECTION INTRAMUSCULAR; INTRAVENOUS at 14:09

## 2019-09-04 RX ADMIN — EPHEDRINE SULFATE 2 MG: 50 INJECTION, SOLUTION INTRAVENOUS at 08:00

## 2019-09-04 RX ADMIN — AMIODARONE HYDROCHLORIDE 200 MG: 200 TABLET ORAL at 20:32

## 2019-09-04 RX ADMIN — CHLORHEXIDINE GLUCONATE 10 ML: 1.2 RINSE ORAL at 12:00

## 2019-09-04 RX ADMIN — SODIUM CHLORIDE, SODIUM LACTATE, POTASSIUM CHLORIDE, CALCIUM CHLORIDE: 600; 310; 30; 20 INJECTION, SOLUTION INTRAVENOUS at 07:32

## 2019-09-04 RX ADMIN — ROCURONIUM BROMIDE 50 MG: 10 INJECTION, SOLUTION INTRAVENOUS at 07:47

## 2019-09-04 RX ADMIN — FENTANYL CITRATE 100 MCG: 50 INJECTION, SOLUTION INTRAMUSCULAR; INTRAVENOUS at 08:35

## 2019-09-04 RX ADMIN — POTASSIUM CHLORIDE 10 MEQ: 200 INJECTION, SOLUTION INTRAVENOUS at 16:02

## 2019-09-04 RX ADMIN — DEXTROSE MONOHYDRATE, SODIUM CHLORIDE, AND POTASSIUM CHLORIDE 25 ML/HR: 50; 4.5; 1.49 INJECTION, SOLUTION INTRAVENOUS at 11:52

## 2019-09-04 RX ADMIN — EPHEDRINE SULFATE 2.5 MG: 50 INJECTION, SOLUTION INTRAVENOUS at 08:04

## 2019-09-04 RX ADMIN — TUBERCULIN PURIFIED PROTEIN DERIVATIVE 5 UNITS: 5 INJECTION INTRADERMAL at 20:25

## 2019-09-04 RX ADMIN — FENTANYL CITRATE 100 MCG: 50 INJECTION, SOLUTION INTRAMUSCULAR; INTRAVENOUS at 08:39

## 2019-09-04 RX ADMIN — SODIUM BICARBONATE 50 MEQ: 84 INJECTION, SOLUTION INTRAVENOUS at 16:51

## 2019-09-04 RX ADMIN — FENTANYL CITRATE 100 MCG: 50 INJECTION, SOLUTION INTRAMUSCULAR; INTRAVENOUS at 08:43

## 2019-09-04 RX ADMIN — EPHEDRINE SULFATE 2.5 MG: 50 INJECTION, SOLUTION INTRAVENOUS at 08:16

## 2019-09-04 RX ADMIN — MIDAZOLAM HYDROCHLORIDE 5 MG: 1 INJECTION, SOLUTION INTRAMUSCULAR; INTRAVENOUS at 09:51

## 2019-09-04 RX ADMIN — ALBUMIN HUMAN 250 ML: 50 SOLUTION INTRAVENOUS at 08:09

## 2019-09-04 RX ADMIN — FENTANYL CITRATE 400 MCG: 50 INJECTION, SOLUTION INTRAMUSCULAR; INTRAVENOUS at 07:46

## 2019-09-04 RX ADMIN — EPHEDRINE SULFATE 1 MG: 50 INJECTION, SOLUTION INTRAVENOUS at 07:55

## 2019-09-04 RX ADMIN — EPHEDRINE SULFATE 2 MG: 50 INJECTION, SOLUTION INTRAVENOUS at 08:49

## 2019-09-04 RX ADMIN — ROCURONIUM BROMIDE 50 MG: 10 INJECTION, SOLUTION INTRAVENOUS at 09:51

## 2019-09-04 RX ADMIN — EPHEDRINE SULFATE 2 MG: 50 INJECTION, SOLUTION INTRAVENOUS at 09:06

## 2019-09-04 RX ADMIN — VECURONIUM BROMIDE FOR INJECTION 5 MG: 1 INJECTION, POWDER, LYOPHILIZED, FOR SOLUTION INTRAVENOUS at 08:35

## 2019-09-04 RX ADMIN — MORPHINE SULFATE 3 MG: 10 INJECTION, SOLUTION INTRAMUSCULAR; INTRAVENOUS at 17:28

## 2019-09-04 RX ADMIN — CHLORHEXIDINE GLUCONATE 10 ML: 1.2 RINSE ORAL at 20:32

## 2019-09-04 RX ADMIN — ALBUMIN HUMAN 250 ML: 50 SOLUTION INTRAVENOUS at 11:14

## 2019-09-04 RX ADMIN — EPHEDRINE SULFATE 2 MG: 50 INJECTION, SOLUTION INTRAVENOUS at 08:54

## 2019-09-04 RX ADMIN — Medication 2 G: at 20:25

## 2019-09-04 RX ADMIN — ATORVASTATIN CALCIUM 80 MG: 40 TABLET, FILM COATED ORAL at 20:32

## 2019-09-04 RX ADMIN — MIDAZOLAM HYDROCHLORIDE 3 MG: 1 INJECTION, SOLUTION INTRAMUSCULAR; INTRAVENOUS at 07:46

## 2019-09-04 RX ADMIN — HEPARIN SODIUM 20000 UNITS: 1000 INJECTION, SOLUTION INTRAVENOUS; SUBCUTANEOUS at 08:49

## 2019-09-04 RX ADMIN — ALBUMIN (HUMAN) 25 G: 12.5 INJECTION, SOLUTION INTRAVENOUS at 20:57

## 2019-09-04 RX ADMIN — Medication 2 SPRAY: at 10:38

## 2019-09-04 RX ADMIN — FENTANYL CITRATE 200 MCG: 50 INJECTION, SOLUTION INTRAMUSCULAR; INTRAVENOUS at 08:38

## 2019-09-04 RX ADMIN — SODIUM CHLORIDE, SODIUM LACTATE, POTASSIUM CHLORIDE, AND CALCIUM CHLORIDE 25 ML/HR: 600; 310; 30; 20 INJECTION, SOLUTION INTRAVENOUS at 05:43

## 2019-09-04 RX ADMIN — SODIUM CHLORIDE 25 ML/HR: 900 INJECTION, SOLUTION INTRAVENOUS at 11:50

## 2019-09-04 RX ADMIN — SODIUM CHLORIDE, SODIUM LACTATE, POTASSIUM CHLORIDE, CALCIUM CHLORIDE: 600; 310; 30; 20 INJECTION, SOLUTION INTRAVENOUS at 08:15

## 2019-09-04 RX ADMIN — AMIODARONE HYDROCHLORIDE 150 MG: 150 INJECTION, SOLUTION INTRAVENOUS at 10:14

## 2019-09-04 RX ADMIN — MUPIROCIN: 20 OINTMENT TOPICAL at 11:58

## 2019-09-04 RX ADMIN — MIDAZOLAM HYDROCHLORIDE 2 MG: 1 INJECTION, SOLUTION INTRAMUSCULAR; INTRAVENOUS at 07:36

## 2019-09-04 RX ADMIN — Medication 2 SPRAY: at 11:16

## 2019-09-04 RX ADMIN — FENTANYL CITRATE 100 MCG: 50 INJECTION, SOLUTION INTRAMUSCULAR; INTRAVENOUS at 08:44

## 2019-09-04 NOTE — PROGRESS NOTES
Bedside and verbal report given to Shannonφόροolivia Ποσειδώlolita Pearson RN for continuation of care.

## 2019-09-04 NOTE — ANESTHESIA PROCEDURE NOTES
Central Line Placement    Start time: 9/4/2019 7:35 AM  End time: 9/4/2019 7:47 AM  Performed by: Saloni Raphael MD  Authorized by: Saloni Raphael MD     Indications: vascular access, central pressure monitoring and need for vasopressors  Preanesthetic Checklist: patient identified, risks and benefits discussed, anesthesia consent, site marked, patient being monitored and timeout performed    Timeout Time: 07:35       Pre-procedure: All elements of maximal sterile barrier technique followed? Yes    2% Chlorhexidine for cutaneous antisepsis, Hand hygiene performed prior to catheter insertion and Ultrasound guidance    Sterile Ultrasound Technique followed?: Yes        Ultrasound Image Stored? Image stored    Procedure:   Prep:  ChloraPrep  Location:  Internal jugular  Orientation:  Right  Patient position:  Trendelenburg  Catheter type:  Double lumen  Catheter size:  9 Fr  Catheter length:  10 cm  Number of attempts:  1  Successful placement: Yes      Assessment:   Post-procedure:  Catheter secured, sterile dressing applied and sterile dressing with CHG applied  Assessment:  Placement verified by x-ray, free fluid flow, blood return through all ports and guidewire removal verified  Insertion:  Uncomplicated  Patient tolerance:  Patient tolerated the procedure well with no immediate complications  PA cath to 40 cm but do to severe ectopy, removed.

## 2019-09-04 NOTE — PROGRESS NOTES
Dual skin assessment performed with second RN. All areas of skin inspected. Allevyn in place over sacrum. Allevyn pulled back and sacrum inspected, allevyn placed back over sacrum. Small area of blanchable redness observed over sacrum. No redness or breakdown observed elsewhere.

## 2019-09-04 NOTE — BRIEF OP NOTE
BRIEF OPERATIVE NOTE    Date of Procedure: 9/4/2019   Preoperative Diagnosis: Nonrheumatic aortic valve stenosis [I35.0]  Postoperative Diagnosis: Nonrheumatic aortic valve stenosis [I35.0]    Procedure(s):  AORTIC VALVE REPLACEMENT (AVR)  ESOPHAGEAL TRANS ECHOCARDIOGRAM  Surgeon(s) and Role:     * Mickie Martínez MD - Primary         Surgical Assistant:     Surgical Staff:  Circ-1: Alyssa Cesar RN  Circ-Relief: Mary Velazco RN  Perfusionist: Pa Perez Tech-1: Burgess Dukes  Scrub Tech-2: Darwin Tinsley  Event Time In Time Out   Incision Start 2350    Incision Close 1059      Anesthesia: General   Estimated Blood Loss: minimal  Specimens:   ID Type Source Tests Collected by Time Destination   1 : Aortic Valve Preservative Aortic Valve  Mickie Martínez MD 9/4/2019 5013 Pathology      Findings: as   Complications: none  Implants:   Implant Name Type Inv.  Item Serial No.  Lot No. LRB No. Used Action   INSPIRIS RESILIA  AORTIC VALVE   [id number] [employer]  N/A 1 Implanted

## 2019-09-04 NOTE — PROGRESS NOTES
Patient out from operating room and placed on the ventilator on documented settings. Patient is orally intubated with a # 7.0 ET Tube secured at the 22 cm abdirizak at the lip. Breath sounds are diminished. Trachea is midline. Negative for subcutaneous air, chest excursion is symmetric. Negative for pitting edema. Patient is also Negative for cyanosis. Patient has a Left Radial arterial line. Patient has 1 Chest tube. All alarms are set and audible. Resuscitation bag is at the head of the bed. Ventilator Settings  Mode FIO2 Rate Tidal Volume Pressure PEEP I:E Ratio   SIMV, Pressure support, PRVC  60 %   16 450 ml  12 cm H2O  8 cm H20  1:2.9      Peak airway pressure: 21 cm H2O   Minute ventilation: 7.1 l/min     ABG: No results for input(s): PH, PCO2, PO2, HCO3 in the last 72 hours.       Gissell Santana, RT

## 2019-09-04 NOTE — OP NOTES
[facility]  OPERATIVE REPORT    Name:  Effie Piña  MR#:  [id number]  :  1950  ACCOUNT #:  [de-identified]  DATE OF SERVICE:  2019    PREOPERATIVE DIAGNOSIS:  Critical aortic stenosis. POSTOPERATIVE DIAGNOSIS:  Critical aortic stenosis. PROCEDURE PERFORMED:  Aortic valve replacement with a 25-mm INSPIRIS [facility] valve. SURGEON:  Suni Tony. Claudette Jules, MD    ASSISTANT:      ANESTHESIA:  General endotracheal with BAM. COMPLICATIONS:  None. SPECIMENS REMOVED:  .    IMPLANTS:  .    ESTIMATED BLOOD LOSS:  Minimal.    OPERATIVE FINDINGS:  Aorta was soft, no palpable plaque. Aortic valve was very heavily calcified. She did have fused left and right cusp into a fishmouth configuration, also very heavily calcified. There was also noted to be marked left ventricular hypertrophy. INDICATION:  The patient is a very pleasant 51-year-old lady who is very active, currently participates in water aerobics who presented to the emergency room with intermittent chest pain. At that time, she was noted to have a murmur. Echo was obtained, which showed a mean gradient of 59 mmHg across her aortic valve. Left heart cath showed nonobstructive mild coronary artery disease. She initially requested TAVR, workup was done; however, due to her anatomy with low lying coronary she was not a TAVR candidate. PROCEDURE:  After informed consent was obtained, the patient was brought to the operating suite and placed in supine position. General endotracheal anesthesia was induced without difficulty. Appropriate monitoring lines were placed by Anesthesia as well as BAM. She was prepped and draped in the usual sterile fashion. A standard median sternotomy incision was then made. Sternum was carefully divided. Systemic heparinization was given. Epicardium was opened and tacked into a pericardial well. Aorta was palpated, noted to be soft.   There was a very harsh systolic thrill. Pursestrings were then placed into the aorta, right atrial appendage, right atrium and the right pulmonary vein. After adequate ACT was obtained, she was cannulated through these pursestrings, connected to the cardiopulmonary bypass and was instituted without difficulty. Aorta was cross-clamped. She received a single antegrade dose of [place] cardioplegia. A left ventricular vent was placed through the pulmonary vein. The aorta was then opened in a hockey-stick fashion revealing a very heavily calcified aortic valve. Left and right cusp were fused together in a fishmouth pattern. This was then meticulously excised. The left ventricle was copiously irrigated with iced saline. At this point, we elected to give one dose of cardioplegia directly down to both left and right coronary arteries and this was completed without difficulty. It measured to a 25 mm. The valve was brought up into the field. Pledgeted sutures were placed around the annulus then passed to the sewing ring of the valve. The valve seated without difficulty. Both left and right coronary ostia were easily visualized. The aortotomy was closed in double-layer fashion with pledgeted 4-0 Prolene. She was placed in steep Trendelenburg position, warmed to 37 degrees centigrade, given a hotshot dose of cardioplegia. Meticulous deairing maneuvers were undertaken, which were checked by transesophageal echo and were noted to be adequate. Cross-clamp was removed. She resumed her normal sinus rhythm. Ventricular pacing wire was placed on the heart, brought out through a separate stab and affixed to the skin. She was weaned from cardiopulmonary bypass without difficulty, decannulated. All pursestrings were tied and noted to be hemostatic. Protamine sulfate was given. Meticulous hemostasis was achieved. The pericardium was copiously irrigated with warm saline.   A single 32-Burkinan mediastinal tube was placed through a separate stab and affixed to the skin. The sternum was re-approximated with stainless steel wire, fascia with #1 PDS, subcutaneous closed with 3-0 Vicryl, and skin was closed with 4-0 Vicryl subcuticular. All instruments and sponge counts were correct at the end of the case.       MD ARETHA [email or web address]/[id number]  D:  09/04/2019 11:26  T:  09/04/2019 12:46  JOB #:  [id number]

## 2019-09-04 NOTE — CONSULTS
Cardiovascular ICU Consult Note: 9/4/2019  Teofilo Velazco  Admission Date: 9/4/2019     The patient's chart is reviewed and the patient is discussed with the staff. Subjective:     Patient is seen at the request of Dr. Warden Collins for respiratory management status post cardiac surgery. Patient had AVR. Currently is sedated in CV-ICU and orally intubated receiving  mechanical ventilation. Was seen for exertional chest discomfort with worsening fatigue and murmur noted. ECHO with severe AS and LHC performed showing mild CAD. Intraoperatively had epistaxis from right nare related to temperature probe. Has chronic lymphoma and followed by oncology, hx CVA 1985, Hx CAD with stent LAD 2009, quit smoking 1998, anxiety, depression, HTN, HLD. SPIROMETRY:  FVC 2.08 liters and 90% of predicted. FEV1 1.71 liters and 106% of predicted. Ratio 82%.     IMPRESSION:  This is a normal spirometry. We have been asked to see in the CV-ICU for mechanical ventilation management and weaning. Prior to Admission Medications   Prescriptions Last Dose Informant Patient Reported? Taking?   amiodarone (CORDARONE) 200 mg tablet   Yes No   Sig: Take 600 mg by mouth once. Amiodarone 200 mg 3 tablets- take by mouth after 4 pm 9/3/19- one time dose- no refills- AVR surgery date 9/4/19- per orders- [address]   aspirin delayed-release 81 mg tablet 9/4/2019 at 0300  Yes Yes   Sig: Take  by mouth daily. citalopram (CELEXA) 20 mg tablet 9/3/2019 at Unknown time  Yes Yes   Sig: Take 20 mg by mouth nightly. clopidogrel (PLAVIX) 75 mg tab 8/27/2019  Yes No   Sig: Take 75 mg by mouth daily. Last dose pt reports 8/26/19-- for scheduled Aortic valve replacement   cyanocobalamin 1,000 mcg tablet 8/27/2019 at Unknown time  Yes Yes   Sig: Take 1,000 mcg by mouth daily. ferrous sulfate 325 mg (65 mg iron) tablet 8/27/2019  Yes No   Sig: Take  by mouth Daily (before breakfast).    fluticasone propionate (FLONASE ALLERGY RELIEF) 50 mcg/actuation nasal spray 9/3/2019 at Unknown time  Yes Yes   Si Sprays by Both Nostrils route as needed for Rhinitis. ibuprofen (ADVIL PO) 2019  Yes No   Sig: Take 1 Tab by mouth as needed (last dose 19- held for surgery). lisinopril (PRINIVIL, ZESTRIL) 10 mg tablet 9/3/2019 at Unknown time  No Yes   Sig: Take 1 Tab by mouth daily. metoprolol tartrate (LOPRESSOR) 25 mg tablet 2019 at 0300  No Yes   Sig: TAKE ONE TABLET BY MOUTH TWICE DAILY   mupirocin calcium (BACTROBAN) 2 % nasal ointment 2019 at 0300  Yes Yes   Sig: by Both Nostrils route two (2) times a day. 19 MRSA/MSSA results SA target not detected. A MRSA NEGATIVE, SA NEGATIVE test result does not preclude MRSA or SA nasal colonization.- Previously called in for scheduled TAVR --- procedure changed to traditional AVR. Pt will apply inside each nare 9/3/19 p.m. And 19 a.m.   nitroglycerin (NITROSTAT) 0.4 mg SL tablet Unknown at Unknown time  No No   Si Tab by SubLINGual route every five (5) minutes as needed for Chest Pain. Indications: Angina   pravastatin (PRAVACHOL) 40 mg tablet 9/3/2019 at Unknown time  No Yes   Sig: TAKE ONE TABLET BY MOUTH EVERY EVENING   therapeutic multivitamin-minerals (THERAGRAN-M) tablet 2019  Yes No   Sig: Take 1 Tab by mouth daily. Facility-Administered Medications: None       Review of Systems  Review of systems not obtained due to patient factors. Patient is sedated and intubated immediatly post op AVR. Past Medical History:   Diagnosis Date    Abnormal weight gain     Acute bronchitis     Acute, but ill-defined, cerebrovascular disease 2015    Adjustment disorder with depressed mood     Allergic rhinitis, cause unspecified 2015    Anxiety and depression     Celexa    Aortic stenosis     19 Echo- AV stenosis-  LVEF 55-60%    Chest pressure     pt presented to ER with CP and SOB- work up recommended TAVR.     Coronary atherosclerosis of unspecified type of vessel, native or graft 2010    stent x 1- denies MI    Disorder of bone and cartilage, unspecified 4/21/2015    Encounter for long-term (current) use of other medications     Former smoker, stopped smoking in distant past     Quit 1998---0.5 ppd x 15 years    GERD (gastroesophageal reflux disease)     valve does not open and close unless laying on Left side and HOB elevated 4\" & 2 pillows, no medications    H/O heart artery stent 2009    X1    Herpes zoster without mention of complication     History of stroke at age 28    on correlation to migraines    Hypercholesteremia     managed well with Pravastatin    Hypertension     Long term current use of anticoagulant     Plavix and Aspirin 81mg    Lymphoma (HCC)     to bones- no treatment required at this time- Followed by Dr Rudy Laughlin incontinence urge and stress (male)(female) 4/21/2015    Osteoarthritis, generalized     Other and unspecified hyperlipidemia     Overweight(278.02)     BMI 28.8    Pleurisy without mention of effusion or current tuberculosis     PONV (postoperative nausea and vomiting)     some N/V- pt does well with antiemetic.     Premature menopause     Rheumatoid arthritis(714.0)     SOB (shortness of breath)     Stroke ([facility] [address] 1985    no residual    Systolic murmur 7/44/6453    Unspecified asthma(493.90) 04/21/2015    pt has not had any complications in \"years\", no inhalers or nebulizers    Unspecified disorder of skin and subcutaneous tissue     Unspecified menopausal and postmenopausal disorder      Past Surgical History:   Procedure Laterality Date    HX CATARACT REMOVAL Bilateral     iol    HX CHOLECYSTECTOMY  1988    HX CORONARY STENT PLACEMENT  2009    HX HEART CATHETERIZATION  08/20/2019    HX HYSTERECTOMY  1998    HX ROTATOR CUFF REPAIR Left     HX SHOULDER ARTHROSCOPY Right     \"chipped bone\"    HX UROLOGICAL  2008    BLADDER TACK    VASCULAR SURGERY PROCEDURE UNLIST Right     VEIN IN R LEG Stripped     Social History     Socioeconomic History    Marital status:      Spouse name: Not on file    Number of children: Not on file    Years of education: Not on file    Highest education level: Not on file   Occupational History    Not on file   Social Needs    Financial resource strain: Not on file    Food insecurity:     Worry: Not on file     Inability: Not on file    Transportation needs:     Medical: Not on file     Non-medical: Not on file   Tobacco Use    Smoking status: Former Smoker     Packs/day: 0.50     Years: 15.00     Pack years: 7.50     Last attempt to quit:      Years since quittin.6    Smokeless tobacco: Never Used   Substance and Sexual Activity    Alcohol use:  Yes     Alcohol/week: 1.0 standard drinks     Types: 1 Standard drinks or equivalent per week    Drug use: No    Sexual activity: Not on file   Lifestyle    Physical activity:     Days per week: Not on file     Minutes per session: Not on file    Stress: Not on file   Relationships    Social connections:     Talks on phone: Not on file     Gets together: Not on file     Attends Evangelical service: Not on file     Active member of club or organization: Not on file     Attends meetings of clubs or organizations: Not on file     Relationship status: Not on file    Intimate partner violence:     Fear of current or ex partner: Not on file     Emotionally abused: Not on file     Physically abused: Not on file     Forced sexual activity: Not on file   Other Topics Concern    Not on file   Social History Narrative    Not on file     Family History   Problem Relation Age of Onset    Diabetes Mother     Glaucoma Mother     Heart Disease Mother     Alcohol abuse Father     Liver Disease Father     Cancer Sister         pancreatic cancer    Diabetes Sister     Hypertension Sister     Diabetes Maternal Grandmother     Diabetes Paternal Grandmother    Sera Heart Disease Sister         CHF    Diabetes Sister      Allergies   Allergen Reactions    Adhesive Rash and Other (comments)     Skin tears    Other Medication Rash and Other (comments)     TAPE,  USE PAPER TAPE--Skin tears    Sulfa (Sulfonamide Antibiotics) Nausea Only       Current Facility-Administered Medications   Medication Dose Route Frequency    0.9% sodium chloride infusion  25 mL/hr IntraVENous CONTINUOUS    dextrose 5% - 0.45% NaCl with KCl 20 mEq/L infusion  25 mL/hr IntraVENous CONTINUOUS    sodium chloride (NS) flush 5-40 mL  5-40 mL IntraVENous Q8H    sodium chloride (NS) flush 5-40 mL  5-40 mL IntraVENous PRN    oxyCODONE-acetaminophen (PERCOCET) 5-325 mg per tablet 1 Tab  1 Tab Oral Q4H PRN    morphine 10 mg/ml injection 3-5 mg  3-5 mg IntraVENous Q1H PRN    naloxone (NARCAN) injection 0.4 mg  0.4 mg IntraVENous PRN    mupirocin (BACTROBAN) 2 % ointment   Both Nostrils BID    ceFAZolin (ANCEF) 2 g/20 mL in sterile water IV syringe  2 g IntraVENous Q8H    sodium bicarbonate (8.4%) injection 50 mEq  50 mEq IntraVENous PRN    EPINEPHrine (ADRENALIN) 4 mg in 0.9% sodium chloride 250 mL infusion  0.05-0.1 mcg/kg/min IntraVENous TITRATE    nitroglycerin (Tridil) 200 mcg/ml infusion   mcg/min IntraVENous TITRATE    lidocaine (PF) (XYLOCAINE) 100 mg/5 mL (2 %) injection syringe  mg   mg IntraVENous ONCE PRN    amiodarone (CORDARONE) tablet 200 mg  200 mg Oral Q12H    [START ON 9/5/2019] metoprolol tartrate (LOPRESSOR) tablet 25 mg  25 mg Oral Q12H    atorvastatin (LIPITOR) tablet 80 mg  80 mg Oral QHS    insulin regular (NOVOLIN R, HUMULIN R) 100 Units in 0.9% sodium chloride 100 mL infusion  1 Units/hr IntraVENous TITRATE    dextrose (D50W) injection syrg 12.5 g  25 mL IntraVENous PRN    [START ON 9/5/2019] aspirin chewable tablet 81 mg  81 mg Oral DAILY    magnesium sulfate 1 g/100 ml IVPB (premix or compounded)  1 g IntraVENous PRN    potassium chloride 10 mEq in 50 ml IVPB  10 mEq IntraVENous PRN    midazolam (VERSED) injection 1 mg  1 mg IntraVENous Q1H PRN    propofol (DIPRIVAN) infusion  0-50 mcg/kg/min IntraVENous TITRATE    meperidine (DEMEROL) injection 25 mg  25 mg IntraVENous Q1H PRN    chlorhexidine (PERIDEX) 0.12 % mouthwash 10 mL  10 mL Oral BID    tuberculin injection 5 Units  5 Units IntraDERMal ONCE    PHENYLephrine (PF)([place]ISON-SYNEPHRINE) 30 mg in 0.9% sodium chloride 250 mL infusion   mcg/min IntraVENous TITRATE    influenza vaccine 2019-20 (6 mos+)(PF) (FLUARIX/FLULAVAL/FLUZONE QUAD) injection 0.5 mL  0.5 mL IntraMUSCular PRIOR TO DISCHARGE    NOREPINephrine (LEVOPHED) 4 mg/250 mL (16 mcg/mL) in NS infusion  0.01-0.2 mcg/kg/min IntraVENous TITRATE         Objective:     Vitals:    09/04/19 1122 09/04/19 1124 09/04/19 1129 09/04/19 1140   BP: 105/46  112/52    Pulse: 72 72 73 85   Resp: 16 20 14 17   Temp: 97.4 °F (36.3 °C)  97.4 °F (36.3 °C)    SpO2: 100% 100% 100% 100%   Weight:       Height:           Intake and Output:   No intake/output data recorded. 09/04 0701 - 09/04 1900  In: 1300 [I.V.:1300]  Out: 640 [Urine:640]    Physical Exam:          Constitutional:  Sedated, orally intubated and mechanically ventilated. EENMT:  Sclera clear, pupils equal, oral mucosa moist and orally intubated, epistaxis right nare currently with rhino rocket in place and external clamp  Respiratory: clear and equal breath sounds bilaterally  Cardiovascular:  RRR with no M,G,R;  Gastrointestinal:  soft; with positive bowel sounds present  Musculoskeletal:  warm with no cyanosis, no lower extremity edema. Sedated with no movements. SKIN:  no jaundice or ecchymosis   Neurologic:  sedated but no gross neuro deficits  Psychiatric:  sedated and unable to assess at this time    CXR:            LINES:  ETT, breen, arterial line, chest tubes times 1 in epigastric area without air leak.     DRIPS:    Levophed  [place]-synephrine  Insulin    CI:  Wileen Child removed    Ventilator Settings  Mode FIO2 Rate Tidal Volume Pressure PEEP   SIMV, Pressure support, PRVC  60 %    450 ml  12 cm H2O  8 cm H20      Peak airway pressure: 21 cm H2O   Minute ventilation: 7.1 l/min     ABG:   Recent Labs     09/04/19  1139 09/04/19  1040 09/04/19  1021   PHI 7.269* 7.347* 7.336*   PCO2I 43.0 41.3 42.2   PO2I 127* 337* 356*   HCO3I 19.7* 22.6 22.6        LAB  Recent Labs     09/04/19  1144 09/03/19  0816   WBC 19.7* 7.4   HGB 9.0* 10.4*   HCT 29.7* 33.8*    284     Recent Labs     09/04/19  1144 09/03/19  0816   * 139   K 3.4* 4.4   * 106   CO2 23 28   * 89   BUN 12 13   CREA 0.46* 0.41*   MG 3.5* 2.3   CA 7.3* 9.0   ALB  --  3.2   SGOT  --  9*     No results for input(s): LCAD, LAC in the last 72 hours. Assessment and Plan :  (Medical Decision Making)     Hospital Problems  Date Reviewed: 8/28/2019          Codes Class Noted POA    * (Principal) Aortic stenosis ICD-10-CM: I35.0  ICD-9-CM: 424.1  9/4/2019 Yes    Chronic. S/P AVR (aortic valve replacement) ICD-10-CM: Z95.2  ICD-9-CM: V43.3  9/4/2019 No    Overview Signed 9/4/2019 12:26 PM by Mendel Miller NP     9/4/19  Dr. Joseph Bunch         Per CV surgery    Encounter for weaning from ventilator [facility]) ICD-10-CM: Z99.11  ICD-9-CM: V46.13  9/4/2019 No    Per protocol after seen by ENT    Hypoxia ICD-10-CM: R09.02  ICD-9-CM: 799.02  9/4/2019 No    Sat acceptable    Epistaxis ICD-10-CM: R04.0  ICD-9-CM: 784.7  9/4/2019 No    ENT consulted    Essential hypertension, benign (Chronic) ICD-10-CM: I10  ICD-9-CM: 401.1  3/12/2009 Yes    Chronic--controlled          Plan:     --Wean mechanical ventilation per protocol. Wean after seen by ENT   --Bronchodilators per protocol. --Incentive spirometry every hour post extubation.   --Review CXR  --ENT consulted for epistaxis-right nare    More than 50% of the time documented was spent in face-to-face contact with the patient and in the care of the patient on the floor/unit where the patient is located. Thank you for this referral.  We appreciate the opportunity to participate in this patient's care. Will follow along with you. Renee Kilgore NP     Lungs:  clear  Heart:  RRR with no Murmur/Rubs/Gallops  HEENT: R nare with rhino rocket in place with nose clips. Skin pale. Neuro: sedated. Additional Comments:  Stable post op with evidence of some bleeding from R nare despite Rhino Rocket. Skin pale with last Hgb of 9. Repeat Hgb at 3 PM. CXR with haziness on R. ? Aspirated blood. Need to follow radiographs and sats. May have small R effusion layering posteriorly. I have examined the patient. I agree with the above assessment and plan as documented.     Warden Carmen MD

## 2019-09-04 NOTE — PERIOP NOTES
TRANSFER - OUT REPORT:    Verbal report given to Cesar Siu on [place]  being transferred to [facility](unit) for routine progression of care       Report consisted of patients Situation, Background, Assessment and   Recommendations(SBAR). Information from the following report(s) OR Summary was reviewed with the receiving nurse. Lines:   Double Lumen 09/04/19 Right Internal jugular (Active)       Oly Turkg Dual 09/04/19 Right Neck (Active)       Peripheral IV 09/04/19 Right Hand (Active)   Site Assessment Clean, dry, & intact 9/4/2019  6:17 AM   Phlebitis Assessment 0 9/4/2019  6:17 AM   Infiltration Assessment 0 9/4/2019  6:17 AM   Dressing Status Clean, dry, & intact 9/4/2019  6:17 AM   Dressing Type Transparent;Tape 9/4/2019  6:17 AM   Hub Color/Line Status Pink; Infusing;Patent 9/4/2019  6:17 AM       Arterial Line 09/04/19 Left Radial artery (Active)        Opportunity for questions and clarification was provided.       Patient transported with:   Monitor  O2 @ 15 liters  Patient-specific medications from Pharmacy  Registered Nurse

## 2019-09-04 NOTE — PROGRESS NOTES
Dr. Angelique Breaux updated regarding patient's current blood gas results. Orders received to give 1 amp of Bicarb and check another blood gas in 1 hour.

## 2019-09-04 NOTE — PROGRESS NOTES
Dr. Tevin Nguyen at patient's bedside. Removed rhino rocket from right nare and replaced with a new rhino rocket. Bleeding appears to have stopped. Okay to extubate.

## 2019-09-04 NOTE — PROGRESS NOTES
Chart screened by  for discharge planning. No needs identified at this time. Please consult  if any new issues arise.       Care Management Interventions  Transition of Care Consult (CM Consult): Discharge Planning

## 2019-09-04 NOTE — PROGRESS NOTES
TRANSFER - IN REPORT:    Verbal report received from [facility]) on [employer]  being received from [facility](unit) for routine post - op      Report consisted of patients Situation, Background, Assessment and   Recommendations(SBAR). Information from the following report(s) SBAR, Kardex, OR Summary, Procedure Summary, Intake/Output, MAR, Recent Results, Med Rec Status and Cardiac Rhythm NSR was reviewed with the receiving nurse. Per anesthesia on admission patient intubation Normal    Collaborative team agrees of surgeon, anesthesia, RT, and RN agrees to proceed with CV weaning protocol        Opportunity for questions and clarification was provided. Assessment completed upon patients arrival to unit and care assumed.

## 2019-09-04 NOTE — ANESTHESIA PROCEDURE NOTES
BAM  Date/Time: 9/4/2019 7:51 AM  Ordering Provider: Johnathon Tapia MD    Procedure Details: probe placement, image aquisition & interpretation    Timeout performed, 07:51  Risks and benefits discussed with the patient and plans are to proceed    Procedure Note    Performed by: Ivan Martin MD  Authorized by: Ivan Martin MD       Indications: assessment of ascending aorta and assessment of surgical repair  Modalities: 2D, CF, CWD, PWD  Probe Type: multiplane  Insertion: atraumatic  Patient Status: intubated and sedated    Echocardiographic and Doppler Measurements   Aorta  Size  Diam(cm)  Dissection PlaqueThick(mm)  Plaque Mobile    Ascending normal 3.2 No 0-3 No    Arch         Descending normal 2.5 No 0-3 No          Valves  Annulus  Stenosis  Area/Grad  Regurg  Leaflet   Morph  Leaflet   Motion    Aortic calcified severe 63mmHg 3+ calcified restricted    Mitral normal none  2+ normal normal    Tricuspid normal none  1+ normal normal          Atria  Size  SEC (smoke)  Thrombus  Tumor  Device    Rt Atrium normal No No No No    Lt Atrium dilated No No No No     Interatrial Septum Morphology: normal    Interventricular Septum Morphology: normal    Ventricle  Cavity Size  Cavity Dimension Hypertrophy  Thrombus  Gloal FXN  EF    RV normal  No no normal     LV dilated  Yes No mildly impaired 45-50%         Post Intervention Follow-up Study  Ventricular Global Function: improved  Ventricular Regional Function: improved     Valve  Function  Regurgitation  Area    Aortic improved 0     Mitral improved 1+     Tricuspid no change      Prosthetic normal       Complications: None  Comments: 25 mm bioprosthetic valve in good position without paravalvular leak. All leaflets moving well. All findings viewed by and discussed with surgeon.

## 2019-09-04 NOTE — ANESTHESIA PROCEDURE NOTES
Arterial Line Placement    Start time: 9/4/2019 7:35 AM  End time: 9/4/2019 7:41 AM  Performed by: Deirdre Augustine CRNA  Authorized by: Neymar Solis MD     Pre-Procedure  Indications:  Arterial pressure monitoring and blood sampling  Preanesthetic Checklist: patient identified, risks and benefits discussed, anesthesia consent, site marked, patient being monitored, timeout performed and patient being monitored    Timeout Time: 07:35        Procedure:   Prep:  ChloraPrep and alcohol  Seldinger Technique?: No    Orientation:  Left  Location:  Radial artery  Catheter size:  20 G  Number of attempts:  1  Cont Cardiac Output Sensor: No      Assessment:   Post-procedure:  Sterile dressing applied and line secured  Patient Tolerance:  Patient tolerated the procedure well with no immediate complications

## 2019-09-04 NOTE — ANESTHESIA POSTPROCEDURE EVALUATION
Procedure(s):  AORTIC VALVE REPLACEMENT (AVR)  ESOPHAGEAL TRANS ECHOCARDIOGRAM.    general    Anesthesia Post Evaluation      Multimodal analgesia: multimodal analgesia used between 6 hours prior to anesthesia start to PACU discharge  Patient location during evaluation: ICU  Patient participation: complete - patient participated  Level of consciousness: awake and alert  Pain management: adequate  Airway patency: patent  Anesthetic complications: no  Cardiovascular status: acceptable  Respiratory status: acceptable  Hydration status: acceptable  Comments: Unfortunately pt has a nosebleed coming from her R nare. This is the location the nasopharyngeal temp probe was located. Will continue to monitor. Primary team has consulted ENT. Vitals Value Taken Time   /56 9/4/2019  1:59 PM   Temp     Pulse 78 9/4/2019  2:02 PM   Resp 17 9/4/2019  2:02 PM   SpO2 100 % 9/4/2019  2:02 PM   Vitals shown include unvalidated device data.

## 2019-09-04 NOTE — PROGRESS NOTES
Respiratory Care Services     Policy Number: -[id number]    Title: CVRU Ventilator Weaning Protocol    Effective Date: 10/2000    Revised Date:  05/2009, 07/2019     Reviewed Date: 06/2013,  05/2014, 07/2015, 03/2016, 06/2017, 05/2018       I. Policy:Upon physician's initial order, this protocol will be implemented for those patients who meet the criteria for weaning from mechanical ventilation. II. Purpose: The respiratory care practitioner will utilize the following protocol to provide the most efficient and effective ventilator weaning and extubation. III. Responsibility: Director, India Hudson, and all Respiratory Care Practitioners with documented competency. IV. Procedure: Management of Ventilator, Extubation, and Post-Extubation Process  Refer to the Fast Track Cardiac Surgery Protocol Policy Number:  [id number] [email or web address]/policies/Critical%20Care%20Policies/-[id number]. doc     V. Documentation  A. Document findings in the respiratory section of the patient's EMR. 1. Document when ventilator is discontinued. 2. Document when airway is removed. B.  Document the following in the Progress Notes:  1. mechanics  2. extubation   3. patient tolerance of extubation  4. respiratory delivery device placed on patient    C. Document changes in therapy per protocol in the respiratory orders section and in the         care plan section of the patient's EMR. D. Document patient education in the patient education section of the patient's EMR. References:  L -  Therapist Driven Respiratory Care Protocols  A Practitioner's Guide for Criteria-Based Respiratory Care by Mendez Murphy M.D., and MAYRA Hill RRT. N  [facility] Clinical Practice Guidelines, Evidenced Based Guidelines for Weaning and Discontinuing Ventilatory Support. Fabiola Cerda

## 2019-09-05 ENCOUNTER — APPOINTMENT (OUTPATIENT)
Dept: GENERAL RADIOLOGY | Age: 69
DRG: 220 | End: 2019-09-05
Attending: THORACIC SURGERY (CARDIOTHORACIC VASCULAR SURGERY)
Payer: MEDICARE

## 2019-09-05 PROBLEM — J98.11 ATELECTASIS, LEFT: Status: ACTIVE | Noted: 2019-09-05

## 2019-09-05 PROBLEM — D62 ACUTE BLOOD LOSS ANEMIA: Status: ACTIVE | Noted: 2019-09-05

## 2019-09-05 LAB
ANION GAP SERPL CALC-SCNC: 9 MMOL/L (ref 7–16)
ATRIAL RATE: 84 BPM
BUN SERPL-MCNC: 16 MG/DL (ref 8–23)
CALCIUM SERPL-MCNC: 7.6 MG/DL (ref 8.3–10.4)
CALCULATED P AXIS, ECG09: 41 DEGREES
CALCULATED R AXIS, ECG10: 10 DEGREES
CALCULATED T AXIS, ECG11: -38 DEGREES
CHLORIDE SERPL-SCNC: 116 MMOL/L (ref 98–107)
CO2 SERPL-SCNC: 21 MMOL/L (ref 21–32)
CREAT SERPL-MCNC: 0.37 MG/DL (ref 0.6–1)
DIAGNOSIS, 93000: NORMAL
ERYTHROCYTE [DISTWIDTH] IN BLOOD BY AUTOMATED COUNT: 13.5 % (ref 11.9–14.6)
GLUCOSE BLD STRIP.AUTO-MCNC: 106 MG/DL (ref 65–100)
GLUCOSE BLD STRIP.AUTO-MCNC: 111 MG/DL (ref 65–100)
GLUCOSE BLD STRIP.AUTO-MCNC: 119 MG/DL (ref 65–100)
GLUCOSE BLD STRIP.AUTO-MCNC: 130 MG/DL (ref 65–100)
GLUCOSE BLD STRIP.AUTO-MCNC: 142 MG/DL (ref 65–100)
GLUCOSE BLD STRIP.AUTO-MCNC: 80 MG/DL (ref 65–100)
GLUCOSE SERPL-MCNC: 99 MG/DL (ref 65–100)
HCT VFR BLD AUTO: 23.9 % (ref 35.8–46.3)
HGB BLD-MCNC: 7.2 G/DL (ref 11.7–15.4)
MAGNESIUM SERPL-MCNC: 2.7 MG/DL (ref 1.8–2.4)
MCH RBC QN AUTO: 29.6 PG (ref 26.1–32.9)
MCHC RBC AUTO-ENTMCNC: 30.1 G/DL (ref 31.4–35)
MCV RBC AUTO: 98.4 FL (ref 79.6–97.8)
MM INDURATION POC: 0 MM (ref 0–5)
NRBC # BLD: 0 K/UL (ref 0–0.2)
P-R INTERVAL, ECG05: 144 MS
PLATELET # BLD AUTO: 163 K/UL (ref 150–450)
PMV BLD AUTO: 9.3 FL (ref 9.4–12.3)
POTASSIUM SERPL-SCNC: 4.1 MMOL/L (ref 3.5–5.1)
PPD POC: NEGATIVE NEGATIVE
Q-T INTERVAL, ECG07: 434 MS
QRS DURATION, ECG06: 88 MS
QTC CALCULATION (BEZET), ECG08: 512 MS
RBC # BLD AUTO: 2.43 M/UL (ref 4.05–5.2)
SODIUM SERPL-SCNC: 146 MMOL/L (ref 136–145)
VENTRICULAR RATE, ECG03: 84 BPM
WBC # BLD AUTO: 10.9 K/UL (ref 4.3–11.1)

## 2019-09-05 PROCEDURE — 97161 PT EVAL LOW COMPLEX 20 MIN: CPT

## 2019-09-05 PROCEDURE — 93005 ELECTROCARDIOGRAM TRACING: CPT | Performed by: THORACIC SURGERY (CARDIOTHORACIC VASCULAR SURGERY)

## 2019-09-05 PROCEDURE — 74011250636 HC RX REV CODE- 250/636: Performed by: PHYSICIAN ASSISTANT

## 2019-09-05 PROCEDURE — 77030013064 HC TRNSF BLD FENW -A

## 2019-09-05 PROCEDURE — 82962 GLUCOSE BLOOD TEST: CPT

## 2019-09-05 PROCEDURE — 74011250637 HC RX REV CODE- 250/637: Performed by: PHYSICIAN ASSISTANT

## 2019-09-05 PROCEDURE — 83735 ASSAY OF MAGNESIUM: CPT

## 2019-09-05 PROCEDURE — 71045 X-RAY EXAM CHEST 1 VIEW: CPT

## 2019-09-05 PROCEDURE — 36600 WITHDRAWAL OF ARTERIAL BLOOD: CPT

## 2019-09-05 PROCEDURE — 65660000004 HC RM CVT STEPDOWN

## 2019-09-05 PROCEDURE — 36430 TRANSFUSION BLD/BLD COMPNT: CPT

## 2019-09-05 PROCEDURE — 97530 THERAPEUTIC ACTIVITIES: CPT

## 2019-09-05 PROCEDURE — P9016 RBC LEUKOCYTES REDUCED: HCPCS

## 2019-09-05 PROCEDURE — 99232 SBSQ HOSP IP/OBS MODERATE 35: CPT | Performed by: INTERNAL MEDICINE

## 2019-09-05 PROCEDURE — 77010033678 HC OXYGEN DAILY

## 2019-09-05 PROCEDURE — 85027 COMPLETE CBC AUTOMATED: CPT

## 2019-09-05 PROCEDURE — 74011250637 HC RX REV CODE- 250/637: Performed by: THORACIC SURGERY (CARDIOTHORACIC VASCULAR SURGERY)

## 2019-09-05 PROCEDURE — 74011250636 HC RX REV CODE- 250/636: Performed by: THORACIC SURGERY (CARDIOTHORACIC VASCULAR SURGERY)

## 2019-09-05 PROCEDURE — 80048 BASIC METABOLIC PNL TOTAL CA: CPT

## 2019-09-05 RX ORDER — POTASSIUM CHLORIDE 20 MEQ/1
20 TABLET, EXTENDED RELEASE ORAL
Status: DISCONTINUED | OUTPATIENT
Start: 2019-09-05 | End: 2019-09-09 | Stop reason: HOSPADM

## 2019-09-05 RX ORDER — MAG HYDROX/ALUMINUM HYD/SIMETH 200-200-20
30 SUSPENSION, ORAL (FINAL DOSE FORM) ORAL
Status: DISCONTINUED | OUTPATIENT
Start: 2019-09-05 | End: 2019-09-09 | Stop reason: HOSPADM

## 2019-09-05 RX ORDER — FUROSEMIDE 40 MG/1
40 TABLET ORAL DAILY
Status: COMPLETED | OUTPATIENT
Start: 2019-09-06 | End: 2019-09-08

## 2019-09-05 RX ORDER — INSULIN LISPRO 100 [IU]/ML
0-10 INJECTION, SOLUTION INTRAVENOUS; SUBCUTANEOUS
Status: DISCONTINUED | OUTPATIENT
Start: 2019-09-05 | End: 2019-09-06

## 2019-09-05 RX ORDER — SODIUM CHLORIDE 0.9 % (FLUSH) 0.9 %
5-40 SYRINGE (ML) INJECTION AS NEEDED
Status: DISCONTINUED | OUTPATIENT
Start: 2019-09-05 | End: 2019-09-06

## 2019-09-05 RX ORDER — ADHESIVE BANDAGE
30 BANDAGE TOPICAL DAILY PRN
Status: DISCONTINUED | OUTPATIENT
Start: 2019-09-05 | End: 2019-09-09 | Stop reason: HOSPADM

## 2019-09-05 RX ORDER — FAMOTIDINE 20 MG/1
20 TABLET, FILM COATED ORAL 2 TIMES DAILY
Status: DISCONTINUED | OUTPATIENT
Start: 2019-09-05 | End: 2019-09-09 | Stop reason: HOSPADM

## 2019-09-05 RX ORDER — ACETAMINOPHEN 325 MG/1
650 TABLET ORAL
Status: DISCONTINUED | OUTPATIENT
Start: 2019-09-05 | End: 2019-09-09 | Stop reason: HOSPADM

## 2019-09-05 RX ORDER — LANOLIN ALCOHOL/MO/W.PET/CERES
400 CREAM (GRAM) TOPICAL
Status: DISCONTINUED | OUTPATIENT
Start: 2019-09-05 | End: 2019-09-09 | Stop reason: HOSPADM

## 2019-09-05 RX ORDER — OXYCODONE AND ACETAMINOPHEN 5; 325 MG/1; MG/1
1 TABLET ORAL
Status: DISCONTINUED | OUTPATIENT
Start: 2019-09-05 | End: 2019-09-09 | Stop reason: HOSPADM

## 2019-09-05 RX ORDER — POTASSIUM CHLORIDE 750 MG/1
10 TABLET, EXTENDED RELEASE ORAL DAILY
Status: COMPLETED | OUTPATIENT
Start: 2019-09-06 | End: 2019-09-08

## 2019-09-05 RX ORDER — AMOXICILLIN 250 MG
2 CAPSULE ORAL
Status: DISCONTINUED | OUTPATIENT
Start: 2019-09-05 | End: 2019-09-06

## 2019-09-05 RX ORDER — FUROSEMIDE 10 MG/ML
20 INJECTION INTRAMUSCULAR; INTRAVENOUS 2 TIMES DAILY
Status: COMPLETED | OUTPATIENT
Start: 2019-09-05 | End: 2019-09-05

## 2019-09-05 RX ORDER — POTASSIUM CHLORIDE 20 MEQ/1
40 TABLET, EXTENDED RELEASE ORAL
Status: DISCONTINUED | OUTPATIENT
Start: 2019-09-05 | End: 2019-09-09 | Stop reason: HOSPADM

## 2019-09-05 RX ORDER — TRAMADOL HYDROCHLORIDE 50 MG/1
50 TABLET ORAL
Status: DISCONTINUED | OUTPATIENT
Start: 2019-09-05 | End: 2019-09-09 | Stop reason: HOSPADM

## 2019-09-05 RX ORDER — SODIUM CHLORIDE 0.9 % (FLUSH) 0.9 %
5-40 SYRINGE (ML) INJECTION EVERY 8 HOURS
Status: DISCONTINUED | OUTPATIENT
Start: 2019-09-05 | End: 2019-09-06

## 2019-09-05 RX ADMIN — FUROSEMIDE 20 MG: 10 INJECTION, SOLUTION INTRAMUSCULAR; INTRAVENOUS at 10:22

## 2019-09-05 RX ADMIN — OXYCODONE HYDROCHLORIDE AND ACETAMINOPHEN 1 TABLET: 5; 325 TABLET ORAL at 20:01

## 2019-09-05 RX ADMIN — MUPIROCIN: 20 OINTMENT TOPICAL at 20:03

## 2019-09-05 RX ADMIN — Medication 10 ML: at 00:58

## 2019-09-05 RX ADMIN — Medication 2 G: at 03:40

## 2019-09-05 RX ADMIN — METOPROLOL TARTRATE 25 MG: 25 TABLET ORAL at 08:22

## 2019-09-05 RX ADMIN — FUROSEMIDE 20 MG: 10 INJECTION, SOLUTION INTRAMUSCULAR; INTRAVENOUS at 09:20

## 2019-09-05 RX ADMIN — OXYCODONE HYDROCHLORIDE AND ACETAMINOPHEN 1 TABLET: 5; 325 TABLET ORAL at 14:59

## 2019-09-05 RX ADMIN — ATORVASTATIN CALCIUM 80 MG: 40 TABLET, FILM COATED ORAL at 21:09

## 2019-09-05 RX ADMIN — FAMOTIDINE 20 MG: 20 TABLET ORAL at 17:00

## 2019-09-05 RX ADMIN — Medication 10 ML: at 21:13

## 2019-09-05 RX ADMIN — AMIODARONE HYDROCHLORIDE 200 MG: 200 TABLET ORAL at 08:22

## 2019-09-05 RX ADMIN — METOPROLOL TARTRATE 25 MG: 25 TABLET ORAL at 20:03

## 2019-09-05 RX ADMIN — OXYCODONE HYDROCHLORIDE AND ACETAMINOPHEN 1 TABLET: 5; 325 TABLET ORAL at 00:47

## 2019-09-05 RX ADMIN — Medication 10 ML: at 05:37

## 2019-09-05 RX ADMIN — AMIODARONE HYDROCHLORIDE 200 MG: 200 TABLET ORAL at 20:02

## 2019-09-05 RX ADMIN — SENNOSIDES, DOCUSATE SODIUM 2 TABLET: 50; 8.6 TABLET, FILM COATED ORAL at 21:07

## 2019-09-05 RX ADMIN — OXYCODONE HYDROCHLORIDE AND ACETAMINOPHEN 1 TABLET: 5; 325 TABLET ORAL at 10:02

## 2019-09-05 RX ADMIN — ASPIRIN 81 MG 81 MG: 81 TABLET ORAL at 08:22

## 2019-09-05 RX ADMIN — ONDANSETRON 4 MG: 2 INJECTION INTRAMUSCULAR; INTRAVENOUS at 23:17

## 2019-09-05 RX ADMIN — MUPIROCIN: 20 OINTMENT TOPICAL at 08:23

## 2019-09-05 NOTE — PROGRESS NOTES
Today's Date: 9/5/2019  Date of Admission: 9/4/2019    Chart Reviewed. Subjective:     Patient feels ok. She denies any dyspnea. Medications Reviewed. Objective:     Vitals:    09/05/19 0444 09/05/19 0459 09/05/19 0521 09/05/19 0707   BP: 120/53 121/55 119/74 120/56   Pulse: 87 87 90 84   Resp: 15 14 15 23   Temp:    97.4 °F (36.3 °C)   SpO2: 96% 96% (!) 85% 94%   Weight:       Height:           Intake and Output  Current Shift: 09/05 0701 - 09/05 1900  In: -   Out: 70 [Urine:50]   Last 3 Shifts: 09/03 1901 - 09/05 0700  In: 2429.1 [I.V.:2429.1]  Out: 2640 [Urine:1815]    Physical Exam:  General: Well Developed, Well Nourished, No Acute Distress, Alert & Oriented x 3, Appropriate mood  Neck: supple, no JVD  Heart: S1S2 with RRR without murmurs or gallops  Lungs: decreased at bases  Abd: soft, nontender, nondistended, with good bowel sounds  Ext: no edema bilaterally  Sternal incision: clean, dry, and intact  Skin: warm and dry    LABS  Data Review:   Recent Labs     09/05/19 0308 09/04/19 2012 09/04/19  1144   *  --   --  146*   K 4.1 4.3   < > 3.4*   MG 2.7* 2.8*   < > 3.5*   BUN 16  --   --  12   CREA 0.37*  --   --  0.46*   GLU 99  --   --  126*   WBC 10.9  --   --  19.7*   HGB 7.2* 8.6*   < > 9.0*   HCT 23.9* 28.5*   < > 29.7*     --   --  170   INR  --   --   --  1.5    < > = values in this interval not displayed.        Assessment/Plan:     Principal Problem:    S/P AVR (aortic valve replacement) (9/4/2019)  Will transfuse for anemia, on ASA, BB, nasal rocket in for epistaxis, appreciate ENT assistance    Active Problems:    Essential hypertension, benign (3/12/2009)  BP stable       Aortic stenosis (9/4/2019)  S/p AVR      Hypoxia (9/4/2019)  On mask due to nasal rocket      Encounter for weaning from ventilator ([address] (9/4/2019)        Epistaxis (9/4/2019)  Nasal rocket in place, appreciate ENT assistance      Atelectasis, left (9/5/2019)        Acute blood loss anemia (9/5/2019)  Will transfuse today, monitor           Silvina Prieto PA-C

## 2019-09-05 NOTE — PROGRESS NOTES
Care Management Interventions  PCP Verified by CM: Yes(Jose Stoner)  Mode of Transport at Discharge: Other (see comment)(spouse)  Transition of Care Consult (CM Consult): Discharge Planning, [address]: No  Reason Outside [place]: Physician referred to specific agency  Discharge Durable Medical Equipment: No  Physical Therapy Consult: Yes  Occupational Therapy Consult: No  Speech Therapy Consult: No  Current Support Network: Lives with Spouse, Own Home  Confirm Follow Up Transport: Family  Plan discussed with Pt/Family/Caregiver: Yes  Freedom of Choice Offered: Yes  Discharge Location  Discharge Placement: Home with home health      This CM met with pt and spouse at bedside this day to complete assessment. Pt verified her PCP, insurance, emergency contact, and home address. Pt reports no difficulty obtaining her medications in the community. She lives at home with spouse with 1 step to enter 1 story home. She reports she has a walker at home. She confirms that at baseline she has been independent with her ADLs including bathing, dressing, cooking, and ambulating. She has not been driving for the past month. We discussed discharge planning and per pt, plan is to return home with spouse at discharge. We discussed the role of home health and she is agreeable to home health referral being made. No additional discharge needs voiced at this time. CM to continue to follow.

## 2019-09-05 NOTE — PROGRESS NOTES
Respiratory Mechanics completed and are as follows:  Weaning Parameters  Spontaneous Breathing Trial Complete: Yes  Resp Rate Observed: 14  Ve: 7  VT: 502  RSBI: 28  NIF: -20  VC: 802  Malik Agitation Sedation Scale (RASS): Alert and calm  Patient extubated to Airvo with a mask with 40L flow and 40% Fi02. . Patient is able to communicate and is negative for stridor. Breath sounds are clear. No complications with extubation.      Colton Landa, RT

## 2019-09-05 NOTE — PROGRESS NOTES
TRANSFER - OUT REPORT:    Verbal report given to Rashaun Luevano RN on Coleman Truong  being transferred to [address] [place] [address] for routine progression of care       Report consisted of patients Situation, Background, Assessment and Recommendations(SBAR). Information from the following report(s) SBAR, Procedure Summary, Intake/Output, MAR, Recent Results and Cardiac Rhythm NSR was reviewed with the receiving nurse. Opportunity for questions and clarification was provided.

## 2019-09-05 NOTE — PROGRESS NOTES
TIMEOUT performed prior to extubation on [address] [facility] with RT, primary RN, and charge RN present on 9/4/2019 at [address] [facility] PM.     Per anesthesia team on admission, patient's intubation was Normal    ABG results as follows:    Lab Results   Component Value Date/Time    pH (POC) 7.303 (L) 09/04/2019 07:32 PM    pCO2 (POC) 43.6 09/04/2019 07:32 PM    pO2 (POC) 152 (H) 09/04/2019 07:32 PM    HCO3 (POC) 21.6 (L) 09/04/2019 07:32 PM    sO2 (POC) 99 (H) 09/04/2019 07:32 PM    Base deficit (POC) 5 09/04/2019 07:32 PM         The patient is hemodynamically stable and can demonstrate the following on a consistent basis:  follow commands , elevate head off the pillow, nods appropriately to questions. Dr. Jaqueline Meek notified of weaning parameters and order to extubate obtained. Patient extubated by (RT name) Alyse Paul to (Type of O2 Device) airvo at (Amount of of O2) 40 without complication.

## 2019-09-05 NOTE — PROGRESS NOTES
TRANSFER - IN REPORT:    Verbal report received from Rubinaωφόροolivia Ποσειkalpeshώνοolivia Pearson RN(name) on Michelle Lance  being received from [facility](unit) for routine progression of care      Report consisted of patients Situation, Background, Assessment and   Recommendations(SBAR). Information from the following report(s) SBAR, Kardex, Intake/Output, MAR, Recent Results and Cardiac Rhythm NSR was reviewed with the receiving nurse. Opportunity for questions and clarification was provided. Assessment completed upon patients arrival to unit and care assumed. Dual skin assessment completed upon pt's arrival to unit. Sacrum with blanchable redness noted under allevyn. Midsternal incision with dressing c/d/i. TPW isolated and taped to chest. No other abnormalities noted.

## 2019-09-05 NOTE — PROGRESS NOTES
Problem: Mobility Impaired (Adult and Pediatric)  Goal: *Acute Goals and Plan of Care (Insert Text)  Description  Discharge Goals:  (1.)Ms. Julia Fiore will move from supine to sit and sit to supine  with INDEPENDENT within 7 treatment day(s). (2.)Ms. Julia Fiore will transfer from bed to chair and chair to bed with INDEPENDENT using the least restrictive device within 7 treatment day(s). (3.)Ms. Julia Fiore will ambulate with INDEPENDENT for 500+ feet with the least restrictive device within 7 treatment day(s). ________________________________________________________________________________________________     Outcome: Progressing Towards Goal       PHYSICAL THERAPY: Initial Assessment and PM 9/5/2019  INPATIENT: PT Visit Days : 1  Payor: Lima Zhang / Plan: [employer] / Product Type: Managed Care Medicare /       NAME/AGE/GENDER: fYn Yi is a 71 y.o. female   PRIMARY DIAGNOSIS: Nonrheumatic aortic valve stenosis [I35.0]  Aortic stenosis [I35.0] S/P AVR (aortic valve replacement)   S/P AVR (aortic valve replacement)    Procedure(s) (LRB):  AORTIC VALVE REPLACEMENT (AVR) (N/A)  ESOPHAGEAL TRANS ECHOCARDIOGRAM (N/A)  1 Day Post-Op  ICD-10: Treatment Diagnosis:    Generalized Muscle Weakness (M62.81)  Difficulty in walking, Not elsewhere classified (R26.2)  History of falling (Z91.81)   Precaution/Allergies:  Milk containing products; Adhesive; Other medication; and Sulfa (sulfonamide antibiotics)   Sternal precautions     ASSESSMENT:     Ms. Julia Fiore is sitting up in bedside chair upon contact and agreeable to PT evaluation and treatment this afternoon. Pt is A&O X 4 with reports of 3/10 pain prior to mobility. Pt lives with her  in 1 story home with 1 step to enter. Pt is independent with gait and ADLs and history of 1 fall approximately 2 weeks ago. Pt reports she had noticed a decline in her function and mobility over past 1-2 months.  Pt enjoys participating in water aerobics 2 days per week. Reviewed sternal precautions prior to mobility and pt verbalized understanding. Pt performed STS with SBA and ambulated in room with RW and SBA. Pt furthered gait distance ambulating 200 ft in hallway with RW and CGA-SBA. Pt requiring 2-3 short standing rest breaks due to increased SOB. Pt returned to room and seated in bedside chair with CGA. Pt with O2 sat at 89% but quickly returned to 90-92% once in seated position. Pt left sitting up with all needs met and within reach. Ardena Given will benefit from skilled PT (medically necessary) to address decreased strength, decreased balance, decreased functional tolerance, decreased cardiopulmonary endurance affecting participation in basic ADLs and functional tasks. This section established at most recent assessment   PROBLEM LIST (Impairments causing functional limitations):  Decreased Strength  Decreased ADL/Functional Activities  Decreased Transfer Abilities  Decreased Ambulation Ability/Technique  Decreased Balance  Increased Pain  Decreased Activity Tolerance  Decreased Pacing Skills  Increased Fatigue  Increased Shortness of Breath  Decreased Knowledge of Precautions  Decreased [place] with Home Exercise Program   INTERVENTIONS PLANNED: (Benefits and precautions of physical therapy have been discussed with the patient.)  Balance Exercise  Bed Mobility  Family Education  Gait Training  Home Exercise Program (HEP)  Neuromuscular Re-education/Strengthening  Range of Motion (ROM)  Therapeutic Activites  Therapeutic Exercise/Strengthening  Transfer Training     TREATMENT PLAN: Frequency/Duration: twice daily for duration of hospital stay  Rehabilitation Potential For Stated Goals: Excellent     REHAB RECOMMENDATIONS (at time of discharge pending progress):    Placement: It is my opinion, based on this patient's performance to date, that Ms. Anna Marie Brown may benefit from [address] after discharge due to the functional deficits listed above that are likely to improve with skilled rehabilitation because he/she has multiple medical issues that affect his/her functional mobility in the community. Equipment:   TBD, may need RW at d/c               HISTORY:   History of Present Injury/Illness (Reason for Referral):  S/p Procedure(s) (LRB):  AORTIC VALVE REPLACEMENT (AVR) (N/A)  ESOPHAGEAL TRANS ECHOCARDIOGRAM (N/A)  Past Medical History/Comorbidities:   Ms. Anna Marie Brown  has a past medical history of Abnormal weight gain, Acute bronchitis, Acute, but ill-defined, cerebrovascular disease (4/20/2015), Adjustment disorder with depressed mood, Allergic rhinitis, cause unspecified (4/21/2015), Anxiety and depression, Aortic stenosis, Chest pressure, Coronary atherosclerosis of unspecified type of vessel, native or graft (2010), Disorder of bone and cartilage, unspecified (4/21/2015), Encounter for long-term (current) use of other medications, Former smoker, stopped smoking in distant past, GERD (gastroesophageal reflux disease), H/O heart artery stent (2009), Herpes zoster without mention of complication, History of stroke (at age 28), Hypercholesteremia, Hypertension, Long term current use of anticoagulant, Lymphoma ([facility] [address] Mixed incontinence urge and stress (male)(female) (4/21/2015), Osteoarthritis, generalized, Other and unspecified hyperlipidemia, Overweight(278.02), Pleurisy without mention of effusion or current tuberculosis, PONV (postoperative nausea and vomiting), Premature menopause, Rheumatoid arthritis(714.0), SOB (shortness of breath), Stroke ([address] (9850), Systolic murmur (3/49/4244), Unspecified asthma(493.90) (04/21/2015), Unspecified disorder of skin and subcutaneous tissue, and Unspecified menopausal and postmenopausal disorder. She also has no past medical history of Other ill-defined conditions(799.89).   Ms. Anna Marie Brown  has a past surgical history that includes hx coronary stent placement (2009); hx shoulder arthroscopy (Right); hx rotator cuff repair (Left); hx urological (2008); hx hysterectomy (1998); hx cholecystectomy (1988); hx cataract removal (Bilateral); vascular surgery procedure unlist (Right); hx heart catheterization (08/20/2019); and hx aortic valve replacement (09/04/2019). Social History/Living Environment:   Home Environment: Private residence  # Steps to Enter: 1  One/Two Story Residence: One story  Living Alone: No  Support Systems: Spouse/Significant Other/Partner  Patient Expects to be Discharged to[de-identified] Private residence  Current DME Used/Available at Home: None  Prior Level of Function/Work/Activity:  Independent with all mobility, decline in function over past 1-2 months, typically very active   Number of Personal Factors/Comorbidities that affect the Plan of Care: 3+: HIGH COMPLEXITY   EXAMINATION:   Most Recent Physical Functioning:   Gross Assessment:  AROM: Generally decreased, functional  Strength: Generally decreased, functional  Coordination: Generally decreased, functional               Posture:     Balance:  Sitting: Intact; Without support  Standing: Intact; With support Bed Mobility:     Wheelchair Mobility:     Transfers:  Sit to Stand: Stand-by assistance  Stand to Sit: Contact guard assistance;Stand-by assistance  Gait:     Base of Support: Narrowed  Speed/Radha: Slow  Step Length: Left shortened;Right shortened  Gait Abnormalities: Decreased step clearance;Shuffling gait  Distance (ft): 200 Feet (ft)  Assistive Device: Walker, rolling  Ambulation - Level of Assistance: Contact guard assistance;Stand-by assistance  Interventions: Safety awareness training; Tactile cues; Verbal cues      Body Structures Involved:  Nerves  Heart  Lungs  Bones  Joints  Muscles  Ligaments Body Functions Affected:  Sensory/Pain  Cardio  Respiratory  Neuromusculoskeletal  Movement Related Activities and Participation Affected:  General Tasks and Demands  Mobility  Self Care  Domestic Life  Interpersonal Interactions and Relationships  Community, Social and [place]   Number of elements that affect the Plan of Care: 4+: HIGH COMPLEXITY   CLINICAL PRESENTATION:   Presentation: Evolving clinical presentation with changing clinical characteristics: MODERATE COMPLEXITY   CLINICAL DECISION MAKIN [facility] Inpatient Short Form  How much difficulty does the patient currently have. .. Unable A Lot A Little None   1. Turning over in bed (including adjusting bedclothes, sheets and blankets)? ? 1   ? 2   ? 3   ? 4   2. Sitting down on and standing up from a chair with arms ( e.g., wheelchair, bedside commode, etc.)   ? 1   ? 2   ? 3   ? 4   3. Moving from lying on back to sitting on the side of the bed?   ? 1   ? 2   ? 3   ? 4   How much help from another person does the patient currently need. .. Total A Lot A Little None   4. Moving to and from a bed to a chair (including a wheelchair)? ? 1   ? 2   ? 3   ? 4   5. Need to walk in hospital room? ? 1   ? 2   ? 3   ? 4   6. Climbing 3-5 steps with a railing? ? 1   ? 2   ? 3   ? 4   © 2007, Trustees of [address] [place] [address] [address] [address], under license to [employer]. All rights reserved      Score:  Initial: 18 Most Recent: X (Date: -- )    Interpretation of Tool:  Represents activities that are increasingly more difficult (i.e. Bed mobility, Transfers, Gait). Medical Necessity:     Patient is expected to demonstrate progress in strength, balance, coordination and functional technique   to decrease assistance required with gait, transfers, and functional mobility   . Reason for Services/Other Comments:  Patient continues to require skilled intervention due to decreased strength, decreased balance, decreased functional tolerance, decreased cardiopulmonary endurance affecting participation in basic ADLs and functional tasks.       .   Use of outcome tool(s) and clinical judgement create a POC that gives a: Clear prediction of patient's progress: LOW COMPLEXITY            TREATMENT:   (In addition to Assessment/Re-Assessment sessions the following treatments were rendered)   Pre-treatment Symptoms/Complaints:  post op pain  Pain: Initial:   Pain Intensity 1: 3  Post Session:  3/10     Therapeutic Activity: (    8 minutes): Therapeutic activities including Chair transfers, Ambulation on level ground and cues for ease and safety of transfers, pacing techniques, education on sternal precautions  to improve mobility, strength, balance and coordination. Required minimal Safety awareness training; Tactile cues; Verbal cues to promote static and dynamic balance in standing and promote coordination of bilateral, upper extremity(s), lower extremity(s). Braces/Orthotics/Lines/Etc:   O2 Device: Room air  Treatment/Session Assessment:    Response to Treatment:  amb 200 ft with RW and CGA-SBA  Interdisciplinary Collaboration:   Physical Therapist  Registered Nurse  After treatment position/precautions:   Up in chair  Bed alarm/tab alert on  Bed/Chair-wheels locked  Bed in low position  Call light within reach   Compliance with Program/Exercises: Will assess as treatment progresses  Recommendations/Intent for next treatment session: \"Next visit will focus on advancements to more challenging activities and reduction in assistance provided\".   Total Treatment Duration:  PT Patient Time In/Time Out  Time In: 1329  Time Out: [address] [facility] [facility]

## 2019-09-05 NOTE — PROGRESS NOTES
Critical Care Daily Progress Note: 9/5/2019    Quang Gandara   Admission Date: 9/4/2019         The patient's chart is reviewed and the patient is discussed with the staff. Patient is seen at the request of Dr. Galileo Treadwell for respiratory management status post cardiac surgery. Patient had AVR. Currently is sedated in CV-ICU and orally intubated receiving  mechanical ventilation. Was seen for exertional chest discomfort with worsening fatigue and murmur noted. ECHO with severe AS and LHC performed showing mild CAD.   Intraoperatively had epistaxis from right nare related to temperature probe.     Has chronic lymphoma and followed by oncology, hx CVA 1985, Hx CAD with stent LAD 2009, quit smoking 1998, anxiety, depression, HTN, HLD.          Subjective:     She is extubated  On FIO2 28%  Up in chair    Current Facility-Administered Medications   Medication Dose Route Frequency    0.9% sodium chloride infusion  25 mL/hr IntraVENous CONTINUOUS    dextrose 5% - 0.45% NaCl with KCl 20 mEq/L infusion  25 mL/hr IntraVENous CONTINUOUS    sodium chloride (NS) flush 5-40 mL  5-40 mL IntraVENous Q8H    sodium chloride (NS) flush 5-40 mL  5-40 mL IntraVENous PRN    oxyCODONE-acetaminophen (PERCOCET) 5-325 mg per tablet 1 Tab  1 Tab Oral Q4H PRN    morphine 10 mg/ml injection 3-5 mg  3-5 mg IntraVENous Q1H PRN    naloxone (NARCAN) injection 0.4 mg  0.4 mg IntraVENous PRN    mupirocin (BACTROBAN) 2 % ointment   Both Nostrils BID    sodium bicarbonate (8.4%) injection 50 mEq  50 mEq IntraVENous PRN    EPINEPHrine (ADRENALIN) 4 mg in 0.9% sodium chloride 250 mL infusion  0.05-0.1 mcg/kg/min IntraVENous TITRATE    nitroglycerin (Tridil) 200 mcg/ml infusion   mcg/min IntraVENous TITRATE    lidocaine (PF) (XYLOCAINE) 100 mg/5 mL (2 %) injection syringe  mg   mg IntraVENous ONCE PRN    amiodarone (CORDARONE) tablet 200 mg  200 mg Oral Q12H    metoprolol tartrate (LOPRESSOR) tablet 25 mg  25 mg Oral Q12H    atorvastatin (LIPITOR) tablet 80 mg  80 mg Oral QHS    insulin regular (NOVOLIN R, HUMULIN R) 100 Units in 0.9% sodium chloride 100 mL infusion  1 Units/hr IntraVENous TITRATE    dextrose (D50W) injection syrg 12.5 g  25 mL IntraVENous PRN    aspirin chewable tablet 81 mg  81 mg Oral DAILY    magnesium sulfate 1 g/100 ml IVPB (premix or compounded)  1 g IntraVENous PRN    potassium chloride 10 mEq in 50 ml IVPB  10 mEq IntraVENous PRN    midazolam (VERSED) injection 1 mg  1 mg IntraVENous Q1H PRN    propofol (DIPRIVAN) infusion  0-50 mcg/kg/min IntraVENous TITRATE    meperidine (DEMEROL) injection 25 mg  25 mg IntraVENous Q1H PRN    chlorhexidine (PERIDEX) 0.12 % mouthwash 10 mL  10 mL Oral BID    tuberculin injection 5 Units  5 Units IntraDERMal ONCE    PHENYLephrine (PF)(MADISON-SYNEPHRINE) 30 mg in 0.9% sodium chloride 250 mL infusion   mcg/min IntraVENous TITRATE    influenza vaccine 2019-20 (6 mos+)(PF) (FLUARIX/FLULAVAL/FLUZONE QUAD) injection 0.5 mL  0.5 mL IntraMUSCular PRIOR TO DISCHARGE    NOREPINephrine (LEVOPHED) 4 mg/250 mL (16 mcg/mL) in NS infusion  0.01-0.2 mcg/kg/min IntraVENous TITRATE    ondansetron (ZOFRAN) injection 4 mg  4 mg IntraVENous Q6H PRN       Review of Systems        Constitutional:  negative for fever, chills, sweats  Cardiovascular:  negative for chest pain, palpitations, syncope, edema  Gastrointestinal:  negative for dysphagia, reflux, vomiting, diarrhea, abdominal pain, or melena  Neurologic:  negative for focal weakness, numbness, headache      Objective:     Vitals:    09/05/19 0429 09/05/19 0444 09/05/19 0459 09/05/19 0521   BP: 106/52 120/53 121/55 119/74   Pulse: 84 87 87 90   Resp: 14 15 14 15   Temp:       SpO2: 96% 96% 96% (!) 85%   Weight:       Height:             Intake/Output Summary (Last 24 hours) at 9/5/2019 0627  Last data filed at 9/5/2019 0543  Gross per 24 hour   Intake 1300 ml   Output 2565 ml   Net -1265 ml Physical Exam:          Constitutional:  the patient is well developed and in no acute distress  EENMT:  Sclera clear, pupils equal, oral mucosa moist  Respiratory: clear  Cardiovascular:  RRR without M,G,R  Gastrointestinal: soft and non-tender; with positive bowel sounds. Musculoskeletal: warm without cyanosis. There is no lower extremity edema. Skin:  no jaundice or rashes, surgical wounds are ok  Neurologic: no gross neuro deficits     Psychiatric:  alert and oriented x 3    LINES:  Cordis, CT,breen    DRIPS:  none    CXR:     9/5/2019              LAB  Recent Labs     09/05/19  0540 09/05/19  0306 09/05/19  0052 09/04/19  2149 09/04/19  1929   GLUCPOC 106* 111* 80 106* 116*      Recent Labs     09/05/19 0308 09/04/19 2012 09/04/19  1625 09/04/19  1144 09/03/19  0816   WBC 10.9  --   --  19.7* 7.4   HGB 7.2* 8.6* 8.6* 9.0* 10.4*   HCT 23.9* 28.5* 28.4* 29.7* 33.8*     --   --  170 284   INR  --   --   --  1.5  --      Recent Labs     09/05/19 0308 09/04/19 2012 09/04/19 1625 09/04/19  1144 09/03/19  0816   *  --   --  146* 139   K 4.1 4.3 4.6 3.4* 4.4   *  --   --  116* 106   CO2 21  --   --  23 28   GLU 99  --   --  126* 89   BUN 16  --   --  12 13   CREA 0.37*  --   --  0.46* 0.41*   MG 2.7* 2.8* 2.9* 3.5* 2.3   CA 7.6*  --   --  7.3* 9.0   ALB  --   --   --   --  3.2   TBILI  --   --   --   --  0.4   ALT  --   --   --   --  10*   SGOT  --   --   --   --  9*     Recent Labs     09/04/19  1932 09/04/19  1755 09/04/19  1631   PHI 7.303* 7.287* 7.237*   PCO2I 43.6 50.2* 50.6*   PO2I 152* 127* 185*   HCO3I 21.6* 24.0 21.5*     No results for input(s): LCAD, LAC in the last 72 hours. No results for input(s): PH, PCO2, PO2, HCO3 in the last 72 hours.      Assessment:  (Medical Decision Making)     Hospital Problems  Date Reviewed: 8/28/2019          Codes Class Noted POA    Atelectasis, left ICD-10-CM: J98.11  ICD-9-CM: 518.0  9/5/2019 Yes        Acute blood loss anemia ICD-10-CM: D62  ICD-9-CM: 285.1  9/5/2019 Yes        * (Principal) Aortic stenosis ICD-10-CM: I35.0  ICD-9-CM: 424.1  9/4/2019 Yes        S/P AVR (aortic valve replacement) ICD-10-CM: Z95.2  ICD-9-CM: V43.3  9/4/2019 No    Overview Signed 9/4/2019 12:26 PM by Elihue Sicard, MOOSE     9/4/19  Dr. Manny Foster             Hypoxia ICD-10-CM: R09.02  ICD-9-CM: 799.02  9/4/2019 No        Encounter for weaning from ventilator [facility]) ICD-10-CM: Z99.11  ICD-9-CM: V46.13  9/4/2019 No        Epistaxis ICD-10-CM: R04.0  ICD-9-CM: 784.7  9/4/2019 No        Essential hypertension, benign (Chronic) ICD-10-CM: I10  ICD-9-CM: 401.1  3/12/2009 Yes              Plan:  (Medical Decision Making)     --? Transfuse today, check with surgery  --IS  --wean FIO2  --per surgery    More than 50% of the time documented was spent in face-to-face contact with the patient and in the care of the patient on the floor/unit where the patient is located.     Fabricio Campbell MD

## 2019-09-05 NOTE — PROGRESS NOTES
Ventilator check complete; patient has a #7. 0 ET tube secured at the 22 at the lip. Patient is not sedated. Patient is able to follow commands. Breath sounds are clear. Trachea is midline, Negative for subcutaneous air, and chest excursion is symmetric. Patient is also Negative for cyanosis and is Negative for pitting edema. All alarms are set and audible. Resuscitation bag is at the head of the bed.       Ventilator Settings  Mode FIO2 Rate Tidal Volume Pressure PEEP I:E Ratio   Pressure support  40 %     8 cm H2O  8 cm H20  1:2.9      Peak airway pressure: 18 cm H2O   Minute ventilation: 6.2 l/min       Henrry Sharp, RT

## 2019-09-05 NOTE — PROGRESS NOTES
Chest tubes off suction for >2 hours, no air leak noted with breathing or coughing. Pt ambulatory in hallway 70 feet prior to chest tube removal with minimal output after.     Chest tube removed per orders during maximum inspiration. Sutures double tied. Petroleum gauze placed over site and bandaged with 4x4 and tape. Pacer wires isolated and taped.     SWAN introducer cath removed with cath tip intact. Pressure held until hemostasis achieved. Site bandaged with gauze, antibiotic ointment, and tegaderm.     Pt remains resting in NAD, no s/sx SOB, SaO2 95% on room air, breath sounds CTA bilaterally and diminished in bases as prior, no subcutaneous emphysema noted.  Will continue to monitor.

## 2019-09-06 ENCOUNTER — APPOINTMENT (OUTPATIENT)
Dept: GENERAL RADIOLOGY | Age: 69
DRG: 220 | End: 2019-09-06
Attending: PHYSICIAN ASSISTANT
Payer: MEDICARE

## 2019-09-06 PROBLEM — Z99.11 ENCOUNTER FOR WEANING FROM VENTILATOR (HCC): Status: RESOLVED | Noted: 2019-09-04 | Resolved: 2019-09-06

## 2019-09-06 LAB
ERYTHROCYTE [DISTWIDTH] IN BLOOD BY AUTOMATED COUNT: 13.5 % (ref 11.9–14.6)
GLUCOSE BLD STRIP.AUTO-MCNC: 120 MG/DL (ref 65–100)
HCT VFR BLD AUTO: 29.9 % (ref 35.8–46.3)
HGB BLD-MCNC: 9.2 G/DL (ref 11.7–15.4)
MAGNESIUM SERPL-MCNC: 2.5 MG/DL (ref 1.8–2.4)
MCH RBC QN AUTO: 29.5 PG (ref 26.1–32.9)
MCHC RBC AUTO-ENTMCNC: 30.8 G/DL (ref 31.4–35)
MCV RBC AUTO: 95.8 FL (ref 79.6–97.8)
MM INDURATION POC: 0 MM (ref 0–5)
NRBC # BLD: 0 K/UL (ref 0–0.2)
PLATELET # BLD AUTO: 173 K/UL (ref 150–450)
PMV BLD AUTO: 9.5 FL (ref 9.4–12.3)
POTASSIUM SERPL-SCNC: 3.8 MMOL/L (ref 3.5–5.1)
PPD POC: NEGATIVE NEGATIVE
RBC # BLD AUTO: 3.12 M/UL (ref 4.05–5.2)
WBC # BLD AUTO: 13.6 K/UL (ref 4.3–11.1)

## 2019-09-06 PROCEDURE — 74011250637 HC RX REV CODE- 250/637: Performed by: PHYSICIAN ASSISTANT

## 2019-09-06 PROCEDURE — 94640 AIRWAY INHALATION TREATMENT: CPT

## 2019-09-06 PROCEDURE — 84132 ASSAY OF SERUM POTASSIUM: CPT

## 2019-09-06 PROCEDURE — 99232 SBSQ HOSP IP/OBS MODERATE 35: CPT | Performed by: INTERNAL MEDICINE

## 2019-09-06 PROCEDURE — 82962 GLUCOSE BLOOD TEST: CPT

## 2019-09-06 PROCEDURE — 36415 COLL VENOUS BLD VENIPUNCTURE: CPT

## 2019-09-06 PROCEDURE — 65660000004 HC RM CVT STEPDOWN

## 2019-09-06 PROCEDURE — 97110 THERAPEUTIC EXERCISES: CPT

## 2019-09-06 PROCEDURE — 71046 X-RAY EXAM CHEST 2 VIEWS: CPT

## 2019-09-06 PROCEDURE — 77010033678 HC OXYGEN DAILY

## 2019-09-06 PROCEDURE — 97530 THERAPEUTIC ACTIVITIES: CPT

## 2019-09-06 PROCEDURE — 85027 COMPLETE CBC AUTOMATED: CPT

## 2019-09-06 PROCEDURE — 83735 ASSAY OF MAGNESIUM: CPT

## 2019-09-06 PROCEDURE — 77030013140 HC MSK NEB VYRM -A

## 2019-09-06 PROCEDURE — 77030012890

## 2019-09-06 PROCEDURE — 94760 N-INVAS EAR/PLS OXIMETRY 1: CPT

## 2019-09-06 PROCEDURE — 74011250637 HC RX REV CODE- 250/637: Performed by: THORACIC SURGERY (CARDIOTHORACIC VASCULAR SURGERY)

## 2019-09-06 PROCEDURE — 74011000250 HC RX REV CODE- 250

## 2019-09-06 RX ORDER — ALBUTEROL SULFATE 0.83 MG/ML
2.5 SOLUTION RESPIRATORY (INHALATION)
Status: DISCONTINUED | OUTPATIENT
Start: 2019-09-06 | End: 2019-09-08

## 2019-09-06 RX ORDER — AMOXICILLIN 250 MG
2 CAPSULE ORAL 2 TIMES DAILY
Status: DISCONTINUED | OUTPATIENT
Start: 2019-09-06 | End: 2019-09-07

## 2019-09-06 RX ORDER — PRAVASTATIN SODIUM 20 MG/1
40 TABLET ORAL
Status: DISCONTINUED | OUTPATIENT
Start: 2019-09-06 | End: 2019-09-09 | Stop reason: HOSPADM

## 2019-09-06 RX ORDER — ALBUTEROL SULFATE 0.83 MG/ML
SOLUTION RESPIRATORY (INHALATION)
Status: COMPLETED
Start: 2019-09-06 | End: 2019-09-06

## 2019-09-06 RX ADMIN — SENNOSIDES, DOCUSATE SODIUM 2 TABLET: 50; 8.6 TABLET, FILM COATED ORAL at 09:56

## 2019-09-06 RX ADMIN — ACETAMINOPHEN 650 MG: 325 TABLET, FILM COATED ORAL at 17:19

## 2019-09-06 RX ADMIN — MUPIROCIN: 20 OINTMENT TOPICAL at 21:00

## 2019-09-06 RX ADMIN — Medication 10 ML: at 21:04

## 2019-09-06 RX ADMIN — METOPROLOL TARTRATE 12.5 MG: 25 TABLET ORAL at 09:59

## 2019-09-06 RX ADMIN — ACETAMINOPHEN 650 MG: 325 TABLET, FILM COATED ORAL at 03:18

## 2019-09-06 RX ADMIN — ACETAMINOPHEN 650 MG: 325 TABLET, FILM COATED ORAL at 09:56

## 2019-09-06 RX ADMIN — FUROSEMIDE 40 MG: 40 TABLET ORAL at 10:00

## 2019-09-06 RX ADMIN — POTASSIUM CHLORIDE 20 MEQ: 20 TABLET, EXTENDED RELEASE ORAL at 05:40

## 2019-09-06 RX ADMIN — Medication 10 ML: at 05:05

## 2019-09-06 RX ADMIN — FAMOTIDINE 20 MG: 20 TABLET ORAL at 17:19

## 2019-09-06 RX ADMIN — ASPIRIN 81 MG 81 MG: 81 TABLET ORAL at 09:57

## 2019-09-06 RX ADMIN — POTASSIUM CHLORIDE 20 MEQ: 20 TABLET, EXTENDED RELEASE ORAL at 12:07

## 2019-09-06 RX ADMIN — FAMOTIDINE 20 MG: 20 TABLET ORAL at 09:57

## 2019-09-06 RX ADMIN — AMIODARONE HYDROCHLORIDE 200 MG: 200 TABLET ORAL at 09:56

## 2019-09-06 RX ADMIN — POTASSIUM CHLORIDE 10 MEQ: 10 TABLET, EXTENDED RELEASE ORAL at 09:57

## 2019-09-06 RX ADMIN — Medication 10 ML: at 10:06

## 2019-09-06 RX ADMIN — ALBUTEROL SULFATE 2.5 MG: 2.5 SOLUTION RESPIRATORY (INHALATION) at 21:35

## 2019-09-06 RX ADMIN — PRAVASTATIN SODIUM 40 MG: 20 TABLET ORAL at 21:02

## 2019-09-06 RX ADMIN — AMIODARONE HYDROCHLORIDE 200 MG: 200 TABLET ORAL at 21:02

## 2019-09-06 RX ADMIN — MUPIROCIN: 20 OINTMENT TOPICAL at 09:58

## 2019-09-06 RX ADMIN — METOPROLOL TARTRATE 25 MG: 25 TABLET ORAL at 21:02

## 2019-09-06 NOTE — PROGRESS NOTES
Daily Progress Note: 9/6/2019    Danae Forde   Admission Date: 9/4/2019         The patient's chart is reviewed and the patient is discussed with the staff. Patient is seen at the request of Dr. Tamiko Boyd for respiratory management status post cardiac surgery. Patient had AVR. Currently is sedated in CV-ICU and orally intubated receiving  mechanical ventilation. Was seen for exertional chest discomfort with worsening fatigue and murmur noted. ECHO with severe AS and LHC performed showing mild CAD. Intraoperatively had epistaxis from right nare related to temperature probe.     Has chronic lymphoma and followed by oncology, hx CVA 1985, Hx CAD with stent LAD 2009, quit smoking 1998, anxiety, depression, HTN, HLD.          Subjective: Weaned to RA. R Rhino rocket in place with no bleeding. Feels Ok and ambulating.      Current Facility-Administered Medications   Medication Dose Route Frequency    senna-docusate (PERICOLACE) 8.6-50 mg per tablet 2 Tab  2 Tab Oral BID    lip protectant (BLISTEX) ointment 1 Each  1 Each Topical PRN    pravastatin (PRAVACHOL) tablet 40 mg  40 mg Oral QHS    furosemide (LASIX) tablet 40 mg  40 mg Oral DAILY    magnesium hydroxide (MILK OF MAGNESIA) 400 mg/5 mL oral suspension 30 mL  30 mL Oral DAILY PRN    alum-mag hydroxide-simeth (MYLANTA) oral suspension 30 mL  30 mL Oral Q4H PRN    famotidine (PEPCID) tablet 20 mg  20 mg Oral BID    acetaminophen (TYLENOL) tablet 650 mg  650 mg Oral Q4H PRN    traMADol (ULTRAM) tablet 50 mg  50 mg Oral Q6H PRN    magnesium oxide (MAG-OX) tablet 400 mg  400 mg Oral TID PRN    magnesium oxide (MAG-OX) tablet 400 mg  400 mg Oral QID PRN    potassium chloride (KLOR-CON) tablet 10 mEq  10 mEq Oral DAILY    potassium chloride (K-DUR, KLOR-CON) SR tablet 20 mEq  20 mEq Oral BID PRN    potassium chloride (K-DUR, KLOR-CON) SR tablet 40 mEq  40 mEq Oral BID PRN    influenza vaccine 2019-20 (6 mos+)(PF) (FLUARIX/FLULAVAL/FLUZONE QUAD) injection 0.5 mL  0.5 mL IntraMUSCular PRIOR TO DISCHARGE    oxyCODONE-acetaminophen (PERCOCET) 5-325 mg per tablet 1 Tab  1 Tab Oral Q4H PRN    sodium chloride (NS) flush 5-40 mL  5-40 mL IntraVENous Q8H    sodium chloride (NS) flush 5-40 mL  5-40 mL IntraVENous PRN    mupirocin (BACTROBAN) 2 % ointment   Both Nostrils BID    amiodarone (CORDARONE) tablet 200 mg  200 mg Oral Q12H    metoprolol tartrate (LOPRESSOR) tablet 25 mg  25 mg Oral Q12H    aspirin chewable tablet 81 mg  81 mg Oral DAILY    ondansetron (ZOFRAN) injection 4 mg  4 mg IntraVENous Q6H PRN       Review of Systems        Constitutional:  negative for fever, chills, sweats  Cardiovascular:  negative for chest pain, palpitations, syncope, edema  Gastrointestinal:  negative for dysphagia, reflux, vomiting, diarrhea, abdominal pain, or melena  Neurologic:  negative for focal weakness, numbness, headache      Objective:     Vitals:    09/06/19 1041 09/06/19 1056 09/06/19 1125 09/06/19 1226   BP: 119/59 123/57 115/56    Pulse: 67 64 64    Resp:   18    Temp:   98.4 °F (36.9 °C)    SpO2:   97% 92%   Weight:       Height:             Intake/Output Summary (Last 24 hours) at 9/6/2019 1300  Last data filed at 9/6/2019 1151  Gross per 24 hour   Intake 420 ml   Output 675 ml   Net -255 ml       Physical Exam:          Constitutional:  the patient is well developed and in no acute distress on RA  EENMT:  Sclera clear, pupils equal, oral mucosa moist; no bleeding from R nare  Respiratory: clear  Cardiovascular:  RRR without M,G,R  Gastrointestinal: soft and non-tender; with positive bowel sounds. Musculoskeletal: warm without cyanosis. There is no lower extremity edema.   Skin:  no jaundice or rashes, surgical wounds are ok  Neurologic: no gross neuro deficits     Psychiatric:  alert and oriented x 3      CXR:     9/5/2019              LAB  Recent Labs     09/06/19  0545 09/05/19  2005 09/05/19  1658 09/05/19  1146 09/05/19  0540   GLUCPOC 120* 142* 119* 130* 106*      Recent Labs     09/06/19 0438 09/05/19 0308 09/04/19 2012 09/04/19  1144   WBC 13.6* 10.9  --   --  19.7*   HGB 9.2* 7.2* 8.6*   < > 9.0*   HCT 29.9* 23.9* 28.5*   < > 29.7*    163  --   --  170   INR  --   --   --   --  1.5    < > = values in this interval not displayed. Recent Labs     09/06/19 0438 09/05/19 0308 09/04/19 2012 09/04/19  1144   NA  --  146*  --   --  146*   K 3.8 4.1 4.3   < > 3.4*   CL  --  116*  --   --  116*   CO2  --  21  --   --  23   GLU  --  99  --   --  126*   BUN  --  16  --   --  12   CREA  --  0.37*  --   --  0.46*   MG 2.5* 2.7* 2.8*   < > 3.5*   CA  --  7.6*  --   --  7.3*    < > = values in this interval not displayed. Recent Labs     09/04/19  1932 09/04/19  1755 09/04/19  1631   PHI 7.303* 7.287* 7.237*   PCO2I 43.6 50.2* 50.6*   PO2I 152* 127* 185*   HCO3I 21.6* 24.0 21.5*     No results for input(s): LCAD, LAC in the last 72 hours. No results for input(s): PH, PCO2, PO2, HCO3 in the last 72 hours. Assessment:  (Medical Decision Making)     Hospital Problems  Date Reviewed: 8/28/2019          Codes Class Noted POA    Atelectasis, left ICD-10-CM: J98.11  ICD-9-CM: 518.0  9/5/2019 Yes    Clinically better    Acute blood loss anemia ICD-10-CM: D62  ICD-9-CM: 285.1  9/5/2019 Yes    Hgb up    Aortic stenosis ICD-10-CM: I35.0  ICD-9-CM: 424.1  9/4/2019 Yes        * (Principal) S/P AVR (aortic valve replacement) ICD-10-CM: Z95.2  ICD-9-CM: V43.3  9/4/2019 No    Overview Signed 9/4/2019 12:26 PM by Margarita Lopez NP     9/4/19  Dr. Ralph Pack         Stable    Hypoxia ICD-10-CM: R09.02  ICD-9-CM: 799.02  9/4/2019 No    Now on RA    Epistaxis ICD-10-CM: R04.0  ICD-9-CM: 784.7  9/4/2019 No    Still with R rhino rocket but no bleeding    Essential hypertension, benign (Chronic) ICD-10-CM: I10  ICD-9-CM: 401.1  3/12/2009 Yes              Plan:  (Medical Decision Making)     Increase activity as tolerated. Rhino rocket per ENT. Call if further assistance needed. Will sign off. More than 50% of the time documented was spent in face-to-face contact with the patient and in the care of the patient on the floor/unit where the patient is located.     Mark Graves MD

## 2019-09-06 NOTE — PROGRESS NOTES
Problem: Falls - Risk of  Goal: *Absence of Falls  Description  Document Ernestine Joyce Fall Risk and appropriate interventions in the flowsheet. Outcome: Progressing Towards Goal  Note:   Fall Risk Interventions:  Mobility Interventions: Bed/chair exit alarm, Communicate number of staff needed for ambulation/transfer, Patient to call before getting OOB, Strengthening exercises (ROM-active/passive)    Medication Interventions: Bed/chair exit alarm, Evaluate medications/consider consulting pharmacy, Patient to call before getting OOB, Teach patient to arise slowly    Elimination Interventions: Bed/chair exit alarm, Call light in reach, Patient to call for help with toileting needs, Stay With Me (per policy), Toileting schedule/hourly rounds    History of Falls Interventions: Bed/chair exit alarm, Consult care management for discharge planning, Door open when patient unattended, Evaluate medications/consider consulting pharmacy, Investigate reason for fall    Problem: Pressure Injury - Risk of  Goal: *Prevention of pressure injury  Description  Document Kenyon Scale and appropriate interventions in the flowsheet.   Outcome: Progressing Towards Goal  Note:   Pressure Injury Interventions:  Sensory Interventions: Assess changes in LOC    Activity Interventions: Increase time out of bed, Pressure redistribution bed/mattress(bed type), PT/OT evaluation    Mobility Interventions: Pressure redistribution bed/mattress (bed type), PT/OT evaluation    Nutrition Interventions: Document food/fluid/supplement intake, Offer support with meals,snacks and hydration    Problem: Cardiac Valve Surgery: Post-Op Day 1  Goal: Activity/Safety  Outcome: Progressing Towards Goal  Goal: Medications  Outcome: Progressing Towards Goal  Goal: Respiratory  Outcome: Progressing Towards Goal  Goal: *Stable cardiac rhythm  Outcome: Progressing Towards Goal  Goal: *Optimal pain control at patient's stated goal  Outcome: Progressing Towards Goal  Goal: *Demonstrates progressive activity  Outcome: Progressing Towards Goal    VS stable, 2 L N/C, R nare with rhino rocket, pain 0/10, and calls for assistance. MS open to air-see wound flowsheet. Will continue to monitor.

## 2019-09-06 NOTE — PROGRESS NOTES
Problem: Mobility Impaired (Adult and Pediatric)  Goal: *Acute Goals and Plan of Care (Insert Text)  Description  Discharge Goals:  (1.)Ms. Moni Nguyen will move from supine to sit and sit to supine  with INDEPENDENT within 7 treatment day(s). (2.)Ms. Moni Nguyen will transfer from bed to chair and chair to bed with INDEPENDENT using the least restrictive device within 7 treatment day(s). (3.)Ms. Moni Nguyen will ambulate with INDEPENDENT for 500+ feet with the least restrictive device within 7 treatment day(s). ________________________________________________________________________________________________     Outcome: Progressing Towards Goal       PHYSICAL THERAPY: Daily Note and PM 9/6/2019  INPATIENT: PT Visit Days : 2  Payor: [address] [place],9D / Plan: [address] / Product Type: Managed Care Medicare /       NAME/AGE/GENDER: Malou Love is a 71 y.o. female   PRIMARY DIAGNOSIS: Nonrheumatic aortic valve stenosis [I35.0]  Aortic stenosis [I35.0] S/P AVR (aortic valve replacement)   S/P AVR (aortic valve replacement)    Procedure(s) (LRB):  AORTIC VALVE REPLACEMENT (AVR) (N/A)  ESOPHAGEAL TRANS ECHOCARDIOGRAM (N/A)  2 Days Post-Op  ICD-10: Treatment Diagnosis:    · Generalized Muscle Weakness (M62.81)  · Difficulty in walking, Not elsewhere classified (R26.2)  · History of falling (Z91.81)   Precaution/Allergies:  Milk containing products; Adhesive; Other medication; and Sulfa (sulfonamide antibiotics)   Sternal precautions     ASSESSMENT:     Ms. Moni Nguyen is sitting up in bedside chair upon contact and agreeable to PT treatment this am.  Pt is A&O X 4. Pt lives with her  in 1 story home with 1 step to enter. Pt is independent with gait and ADLs and history of 1 fall approximately 2 weeks ago. Pt reports she had noticed a decline in her function and mobility over past 1-2 months. Pt enjoys participating in water aerobics 2 days per week.   Sit to stand with stand by assist.  Patient request to use the bathroom first.  She did quite well in the bathroom. Gait training with rolling walker x 200 feet with slow jorge. O2 intact. Patient is returned to the recliner and after a short rest break the patient is instructed in the therapeutic exercises with written copy issued. Good session. Patient is making progress towards goals. Pt left sitting up with all needs met and within reach. Santa Kaela will benefit from skilled PT (medically necessary) to address decreased strength, decreased balance, decreased functional tolerance, decreased cardiopulmonary endurance affecting participation in basic ADLs and functional tasks. Will continue PT efforts. Home with HHPT when ready for discharge. PM note:  Patient is sitting in the recliner and agreeable to therapy. Patient was able to maintain her am gait distance however this pm the patient is without her O2. Therapeutic exercises continued. Tolerated well and the patient is making good progress towards goals. Will continue Pt efforts. This section established at most recent assessment   PROBLEM LIST (Impairments causing functional limitations):  1. Decreased Strength  2. Decreased ADL/Functional Activities  3. Decreased Transfer Abilities  4. Decreased Ambulation Ability/Technique  5. Decreased Balance  6. Increased Pain  7. Decreased Activity Tolerance  8. Decreased Pacing Skills  9. Increased Fatigue  10. Increased Shortness of Breath  11. Decreased Knowledge of Precautions  12. Decreased [place] with Home Exercise Program   INTERVENTIONS PLANNED: (Benefits and precautions of physical therapy have been discussed with the patient.)  1. Balance Exercise  2. Bed Mobility  3. Family Education  4. Gait Training  5.  Home Exercise Program (HEP)  6. Neuromuscular Re-education/Strengthening  7. Range of Motion (ROM)  8. Therapeutic Activites  9. Therapeutic Exercise/Strengthening  10. Transfer Training     TREATMENT PLAN: Frequency/Duration: twice daily for duration of hospital stay  Rehabilitation Potential For Stated Goals: Excellent     REHAB RECOMMENDATIONS (at time of discharge pending progress):    Placement: It is my opinion, based on this patient's performance to date, that Ms. Eric Milton may benefit from [address] after discharge due to the functional deficits listed above that are likely to improve with skilled rehabilitation because he/she has multiple medical issues that affect his/her functional mobility in the community.   Equipment:    TBD, may need RW at d/c               HISTORY:   History of Present Injury/Illness (Reason for Referral):  S/p Procedure(s) (LRB):  AORTIC VALVE REPLACEMENT (AVR) (N/A)  ESOPHAGEAL TRANS ECHOCARDIOGRAM (N/A)  Past Medical History/Comorbidities:   Ms. Eric Milton  has a past medical history of Abnormal weight gain, Acute bronchitis, Acute, but ill-defined, cerebrovascular disease (4/20/2015), Adjustment disorder with depressed mood, Allergic rhinitis, cause unspecified (4/21/2015), Anxiety and depression, Aortic stenosis, Chest pressure, Coronary atherosclerosis of unspecified type of vessel, native or graft (2010), Disorder of bone and cartilage, unspecified (4/21/2015), Encounter for long-term (current) use of other medications, Former smoker, stopped smoking in distant past, GERD (gastroesophageal reflux disease), H/O heart artery stent (2009), Herpes zoster without mention of complication, History of stroke (at age 28), Hypercholesteremia, Hypertension, Long term current use of anticoagulant, Lymphoma ([facility] [address] Mixed incontinence urge and stress (male)(female) (4/21/2015), Osteoarthritis, generalized, Other and unspecified hyperlipidemia, Overweight(278.02), Pleurisy without mention of effusion or current tuberculosis, PONV (postoperative nausea and vomiting), Premature menopause, Rheumatoid arthritis(714.0), SOB (shortness of breath), Stroke ([facility] [address] (8876), Systolic murmur (9/21/3844), Unspecified asthma(493.90) (04/21/2015), Unspecified disorder of skin and subcutaneous tissue, and Unspecified menopausal and postmenopausal disorder. She also has no past medical history of Other ill-defined conditions(799.89). Ms. Moni Nguyen  has a past surgical history that includes hx coronary stent placement (2009); hx shoulder arthroscopy (Right); hx rotator cuff repair (Left); hx urological (2008); hx hysterectomy (1998); hx cholecystectomy (1988); hx cataract removal (Bilateral); vascular surgery procedure unlist (Right); hx heart catheterization (08/20/2019); and hx aortic valve replacement (09/04/2019). Social History/Living Environment:   Home Environment: Private residence  # Steps to Enter: 1  One/Two Story Residence: One story  Living Alone: No  Support Systems: Spouse/Significant Other/Partner  Patient Expects to be Discharged to[de-identified] Private residence  Current DME Used/Available at Home: None  Prior Level of Function/Work/Activity:  Independent with all mobility, decline in function over past 1-2 months, typically very active   Number of Personal Factors/Comorbidities that affect the Plan of Care: 3+: HIGH COMPLEXITY   EXAMINATION:   Most Recent Physical Functioning:   Gross Assessment:                  Posture:     Balance:  Sitting: Intact  Standing: Intact Bed Mobility:     Wheelchair Mobility:     Transfers:  Sit to Stand: Stand-by assistance  Stand to Sit: Contact guard assistance  Gait:     Speed/Radha: Slow  Distance (ft): 200 Feet (ft)  Assistive Device: Walker, rolling  Ambulation - Level of Assistance: Contact guard assistance  Interventions: Safety awareness training      Body Structures Involved:  1. Nerves  2. Heart  3. Lungs  4. Bones  5. Joints  6. Muscles  7.  Ligaments Body Functions Affected:  1. Sensory/Pain  2. Cardio  3. Respiratory  4. Neuromusculoskeletal  5. Movement Related Activities and Participation Affected:  1. General Tasks and Demands  2. Mobility  3. Self Care  4. Domestic Life  5. Interpersonal Interactions and Relationships  6. Community, Social and [place]   Number of elements that affect the Plan of Care: 4+: HIGH COMPLEXITY   CLINICAL PRESENTATION:   Presentation: Evolving clinical presentation with changing clinical characteristics: MODERATE COMPLEXITY   CLINICAL DECISION MAKIN [facility] Mobility Inpatient Short Form  How much difficulty does the patient currently have. .. Unable A Lot A Little None   1. Turning over in bed (including adjusting bedclothes, sheets and blankets)? ? 1   ? 2   ? 3   ? 4   2. Sitting down on and standing up from a chair with arms ( e.g., wheelchair, bedside commode, etc.)   ? 1   ? 2   ? 3   ? 4   3. Moving from lying on back to sitting on the side of the bed?   ? 1   ? 2   ? 3   ? 4   How much help from another person does the patient currently need. .. Total A Lot A Little None   4. Moving to and from a bed to a chair (including a wheelchair)? ? 1   ? 2   ? 3   ? 4   5. Need to walk in hospital room? ? 1   ? 2   ? 3   ? 4   6. Climbing 3-5 steps with a railing? ? 1   ? 2   ? 3   ? 4   © , Trustees of [address] [place] [address] [address] [id number], under license to [employer]. All rights reserved      Score:  Initial: 18 Most Recent: X (Date: -- )    Interpretation of Tool:  Represents activities that are increasingly more difficult (i.e. Bed mobility, Transfers, Gait). Medical Necessity:     · Patient is expected to demonstrate progress in strength, balance, coordination and functional technique  ·  to decrease assistance required with gait, transfers, and functional mobility   · .   Reason for Services/Other Comments:  · Patient continues to require skilled intervention due to decreased strength, decreased balance, decreased functional tolerance, decreased cardiopulmonary endurance affecting participation in basic ADLs and functional tasks. · .   Use of outcome tool(s) and clinical judgement create a POC that gives a: Clear prediction of patient's progress: LOW COMPLEXITY            TREATMENT:   (In addition to Assessment/Re-Assessment sessions the following treatments were rendered)   Pre-treatment Symptoms/Complaints:  \"I'm ready\"  Pain: Initial:   Pain Intensity 1: 0  Post Session:  0/10     Therapeutic Activity: (    15 minutes): Therapeutic activities including Chair transfers, Ambulation on level ground and cues for ease and safety of transfers, pacing techniques, education on sternal precautions  to improve mobility, strength, balance and coordination. Required minimal Safety awareness training to promote static and dynamic balance in standing and promote coordination of bilateral, upper extremity(s), lower extremity(s). Therapeutic Exercise: ( 10 minutes):  Exercises per grid below to improve mobility, strength, balance and coordination. Required minimum  verbal cues to promote proper body breathing techniques. Progressed repetitions and complexity of movement as indicated.        Date:  9/6/19 Date:   Date:     ACTIVITY/EXERCISE AM PM AM PM AM PM   LAQ 15 15       Shoulder shrugs 15 15       Gluteal sets 15 15       marching 15 15       Ankle pumps 15 15       abductions 15 15                B = bilateral; AA = active assistive; A = active; P = passive      Braces/Orthotics/Lines/Etc:   · O2 NC  Treatment/Session Assessment:    · Response to Treatment:  amb 200 ft with RW and CGA  · Interdisciplinary Collaboration:   o Physical Therapy Assistant  o Registered Nurse  · After treatment position/precautions:   o Up in chair  o Bed alarm/tab alert on  o Bed/Chair-wheels locked  o Bed in low position  o Call light within reach   · Compliance with Program/Exercises: Compliant all of the time  · Recommendations/Intent for next treatment session: \"Next visit will focus on advancements to more challenging activities and reduction in assistance provided\".   Total Treatment Duration:  PT Patient Time In/Time Out  Time In: 1410  Time Out: 1435    Cady Cazares-Rafita, PTA

## 2019-09-06 NOTE — PROGRESS NOTES
Cardiac Rehab: Spoke with patient regarding referral to cardiac rehab. Patient meets admission criteria based on AVR (09/04/19). Written information about Cardiac Rehab given and reviewed with patient. Discussed lifestyle modifications to promote cardiac wellness. Patient indicated that she wants to participate in the cardiac rehab program at the [facility] program which is closer to her home in [place]. She is aware of the waiting period associated with the [facility]. Pt given contact number for the [facility] for the [address] and encouraged to call shortly after discharge to verify that she is on the waiting list for their program. We will forward her referral to [facility] as requested. Her Cardiologist is Dr. Aysha Enriquez.       Thank you,  NARA Tavera, RN  Cardiopulmonary Rehabilitation Nurse Liaison  [facility]

## 2019-09-06 NOTE — PROGRESS NOTES
Dr. Jonna Vincent ENT paged and discussed plan for Rhinorocket device in R jane. MD gave order to remove device.

## 2019-09-06 NOTE — PROGRESS NOTES
Problem: Mobility Impaired (Adult and Pediatric)  Goal: *Acute Goals and Plan of Care (Insert Text)  Description  Discharge Goals:  (1.)Ms. Paul Bowen will move from supine to sit and sit to supine  with INDEPENDENT within 7 treatment day(s). (2.)Ms. Paul Bowen will transfer from bed to chair and chair to bed with INDEPENDENT using the least restrictive device within 7 treatment day(s). (3.)Ms. Paul Bowen will ambulate with INDEPENDENT for 500+ feet with the least restrictive device within 7 treatment day(s). ________________________________________________________________________________________________     Outcome: Progressing Towards Goal       PHYSICAL THERAPY: Daily Note and AM 9/6/2019  INPATIENT: PT Visit Days : 2  Payor: Radha President / Plan: [employer] [address] / Product Type: Managed Care Medicare /       NAME/AGE/GENDER: Lilo Mart is a 71 y.o. female   PRIMARY DIAGNOSIS: Nonrheumatic aortic valve stenosis [I35.0]  Aortic stenosis [I35.0] S/P AVR (aortic valve replacement)   S/P AVR (aortic valve replacement)    Procedure(s) (LRB):  AORTIC VALVE REPLACEMENT (AVR) (N/A)  ESOPHAGEAL TRANS ECHOCARDIOGRAM (N/A)  2 Days Post-Op  ICD-10: Treatment Diagnosis:    · Generalized Muscle Weakness (M62.81)  · Difficulty in walking, Not elsewhere classified (R26.2)  · History of falling (Z91.81)   Precaution/Allergies:  Milk containing products; Adhesive; Other medication; and Sulfa (sulfonamide antibiotics)   Sternal precautions     ASSESSMENT:     Ms. Paul Bowen is sitting up in bedside chair upon contact and agreeable to PT treatment this am.  Pt is A&O X 4. Pt lives with her  in 1 story home with 1 step to enter. Pt is independent with gait and ADLs and history of 1 fall approximately 2 weeks ago. Pt reports she had noticed a decline in her function and mobility over past 1-2 months. Pt enjoys participating in water aerobics 2 days per week.   Sit to stand with stand by assist.  Patient request to use the bathroom first.  She did quite well in the bathroom. Gait training with rolling walker x 200 feet with slow jorge. O2 intact. Patient is returned to the recliner and after a short rest break the patient is instructed in the therapeutic exercises with written copy issued. Good session. Patient is making progress towards goals. Pt left sitting up with all needs met and within reach. Zina Kaplan will benefit from skilled PT (medically necessary) to address decreased strength, decreased balance, decreased functional tolerance, decreased cardiopulmonary endurance affecting participation in basic ADLs and functional tasks. Will continue PT efforts. Home with HHPT when ready for discharge. This section established at most recent assessment   PROBLEM LIST (Impairments causing functional limitations):  1. Decreased Strength  2. Decreased ADL/Functional Activities  3. Decreased Transfer Abilities  4. Decreased Ambulation Ability/Technique  5. Decreased Balance  6. Increased Pain  7. Decreased Activity Tolerance  8. Decreased Pacing Skills  9. Increased Fatigue  10. Increased Shortness of Breath  11. Decreased Knowledge of Precautions  12. Decreased [place] with Home Exercise Program   INTERVENTIONS PLANNED: (Benefits and precautions of physical therapy have been discussed with the patient.)  1. Balance Exercise  2. Bed Mobility  3. Family Education  4. Gait Training  5. Home Exercise Program (HEP)  6. Neuromuscular Re-education/Strengthening  7. Range of Motion (ROM)  8. Therapeutic Activites  9. Therapeutic Exercise/Strengthening  10. Transfer Training     TREATMENT PLAN: Frequency/Duration: twice daily for duration of hospital stay  Rehabilitation Potential For Stated Goals: Excellent     REHAB RECOMMENDATIONS (at time of discharge pending progress):    Placement: It is my opinion, based on this patient's performance to date, that Ms.  Inessa Sai may benefit from [address] after discharge due to the functional deficits listed above that are likely to improve with skilled rehabilitation because he/she has multiple medical issues that affect his/her functional mobility in the community. Equipment:    TBD, may need RW at d/c               HISTORY:   History of Present Injury/Illness (Reason for Referral):  S/p Procedure(s) (LRB):  AORTIC VALVE REPLACEMENT (AVR) (N/A)  ESOPHAGEAL TRANS ECHOCARDIOGRAM (N/A)  Past Medical History/Comorbidities:   Ms. Inessa Gibbs  has a past medical history of Abnormal weight gain, Acute bronchitis, Acute, but ill-defined, cerebrovascular disease (4/20/2015), Adjustment disorder with depressed mood, Allergic rhinitis, cause unspecified (4/21/2015), Anxiety and depression, Aortic stenosis, Chest pressure, Coronary atherosclerosis of unspecified type of vessel, native or graft (2010), Disorder of bone and cartilage, unspecified (4/21/2015), Encounter for long-term (current) use of other medications, Former smoker, stopped smoking in distant past, GERD (gastroesophageal reflux disease), H/O heart artery stent (2009), Herpes zoster without mention of complication, History of stroke (at age 28), Hypercholesteremia, Hypertension, Long term current use of anticoagulant, Lymphoma ([address] Mixed incontinence urge and stress (male)(female) (4/21/2015), Osteoarthritis, generalized, Other and unspecified hyperlipidemia, Overweight(278.02), Pleurisy without mention of effusion or current tuberculosis, PONV (postoperative nausea and vomiting), Premature menopause, Rheumatoid arthritis(714.0), SOB (shortness of breath), Stroke ([address] (9170), Systolic murmur (7/65/4181), Unspecified asthma(493.90) (04/21/2015), Unspecified disorder of skin and subcutaneous tissue, and Unspecified menopausal and postmenopausal disorder. She also has no past medical history of Other ill-defined conditions(799.89).   Ms. Inessa Gibbs  has a past surgical history that includes hx coronary stent placement (2009); hx shoulder arthroscopy (Right); hx rotator cuff repair (Left); hx urological (2008); hx hysterectomy (1998); hx cholecystectomy (1988); hx cataract removal (Bilateral); vascular surgery procedure unlist (Right); hx heart catheterization (08/20/2019); and hx aortic valve replacement (09/04/2019). Social History/Living Environment:   Home Environment: Private residence  # Steps to Enter: 1  One/Two Story Residence: One story  Living Alone: No  Support Systems: Spouse/Significant Other/Partner  Patient Expects to be Discharged to[de-identified] Private residence  Current DME Used/Available at Home: None  Prior Level of Function/Work/Activity:  Independent with all mobility, decline in function over past 1-2 months, typically very active   Number of Personal Factors/Comorbidities that affect the Plan of Care: 3+: HIGH COMPLEXITY   EXAMINATION:   Most Recent Physical Functioning:   Gross Assessment:                  Posture:     Balance:  Sitting: Intact  Standing: Intact Bed Mobility:     Wheelchair Mobility:     Transfers:  Sit to Stand: Stand-by assistance  Stand to Sit: Contact guard assistance  Gait:     Speed/Radha: Slow  Distance (ft): 200 Feet (ft)  Assistive Device: Walker, rolling  Ambulation - Level of Assistance: Contact guard assistance  Interventions: Safety awareness training      Body Structures Involved:  1. Nerves  2. Heart  3. Lungs  4. Bones  5. Joints  6. Muscles  7. Ligaments Body Functions Affected:  1. Sensory/Pain  2. Cardio  3. Respiratory  4. Neuromusculoskeletal  5. Movement Related Activities and Participation Affected:  1. General Tasks and Demands  2. Mobility  3. Self Care  4. Domestic Life  5. Interpersonal Interactions and Relationships  6.  Community, Social and [place]   Number of elements that affect the Plan of Care: 4+: HIGH COMPLEXITY   CLINICAL PRESENTATION:   Presentation: Evolving clinical presentation with changing clinical characteristics: MODERATE COMPLEXITY   CLINICAL DECISION MAKIN[address] [place], [place] [id number] AM-PAC 6 Clicks   Basic Mobility Inpatient Short Form  How much difficulty does the patient currently have. .. Unable A Lot A Little None   1. Turning over in bed (including adjusting bedclothes, sheets and blankets)? ? 1   ? 2   ? 3   ? 4   2. Sitting down on and standing up from a chair with arms ( e.g., wheelchair, bedside commode, etc.)   ? 1   ? 2   ? 3   ? 4   3. Moving from lying on back to sitting on the side of the bed?   ? 1   ? 2   ? 3   ? 4   How much help from another person does the patient currently need. .. Total A Lot A Little None   4. Moving to and from a bed to a chair (including a wheelchair)? ? 1   ? 2   ? 3   ? 4   5. Need to walk in hospital room? ? 1   ? 2   ? 3   ? 4   6. Climbing 3-5 steps with a railing? ? 1   ? 2   ? 3   ? 4   © , Trustees of [address] [place], [place] [address], under license to [employer]. All rights reserved      Score:  Initial: 18 Most Recent: X (Date: -- )    Interpretation of Tool:  Represents activities that are increasingly more difficult (i.e. Bed mobility, Transfers, Gait). Medical Necessity:     · Patient is expected to demonstrate progress in strength, balance, coordination and functional technique  ·  to decrease assistance required with gait, transfers, and functional mobility   · . Reason for Services/Other Comments:  · Patient continues to require skilled intervention due to decreased strength, decreased balance, decreased functional tolerance, decreased cardiopulmonary endurance affecting participation in basic ADLs and functional tasks. · .   Use of outcome tool(s) and clinical judgement create a POC that gives a: Clear prediction of patient's progress: LOW COMPLEXITY            TREATMENT:   (In addition to Assessment/Re-Assessment sessions the following treatments were rendered)   Pre-treatment Symptoms/Complaints:   \"Hello\"  Pain: Initial:   Pain Intensity 1: 0  Post Session:  0/10     Therapeutic Activity: (    15 minutes): Therapeutic activities including Chair transfers, Ambulation on level ground and cues for ease and safety of transfers, pacing techniques, education on sternal precautions  to improve mobility, strength, balance and coordination. Required minimal Safety awareness training to promote static and dynamic balance in standing and promote coordination of bilateral, upper extremity(s), lower extremity(s). Therapeutic Exercise: ( 10 minutes):  Exercises per grid below to improve mobility, strength, balance and coordination. Required minimum  verbal cues to promote proper body breathing techniques. Progressed repetitions and complexity of movement as indicated. Date:  9/6/19 Date:   Date:     ACTIVITY/EXERCISE AM PM AM PM AM PM   LAQ 15        Shoulder shrugs 15        Gluteal sets 15        marching 15        Ankle pumps 15        abductions 15                 B = bilateral; AA = active assistive; A = active; P = passive      Braces/Orthotics/Lines/Etc:   · O2 NC  Treatment/Session Assessment:    · Response to Treatment:  amb 200 ft with RW and CGA  · Interdisciplinary Collaboration:   o Physical Therapy Assistant  o Registered Nurse  · After treatment position/precautions:   o Up in chair  o Bed alarm/tab alert on  o Bed/Chair-wheels locked  o Bed in low position  o Call light within reach   · Compliance with Program/Exercises: Compliant all of the time  · Recommendations/Intent for next treatment session: \"Next visit will focus on advancements to more challenging activities and reduction in assistance provided\".   Total Treatment Duration:  PT Patient Time In/Time Out  Time In: 0915  Time Out: 0940    Guille Restrepo PTA

## 2019-09-06 NOTE — PROGRESS NOTES
This CM faxed referral to [facility] this day. No additional discharge needs at this time. CM to continue to follow.

## 2019-09-06 NOTE — PROGRESS NOTES
Today's Date: 9/6/2019  Date of Admission: 9/4/2019    Chart Reviewed. Subjective:     Patient feels better today. She denies any dyspnea. Medications Reviewed. Objective:     Vitals:    09/05/19 2300 09/05/19 2302 09/06/19 0310 09/06/19 0657   BP: 123/60  138/64 128/59   Pulse: 75  74 70   Resp: 16  16 18   Temp: 97.7 °F (36.5 °C)  98 °F (36.7 °C) 97.7 °F (36.5 °C)   SpO2: 90% 92% 93% 96%   Weight:   161 lb 9.6 oz (73.3 kg)    Height:           Intake and Output  Current Shift: No intake/output data recorded. Last 3 Shifts: 09/04 1901 - 09/06 0700  In: 3014.1 [P.O.:1140; I.V.:1254.1]  Out: 2760 [Urine:2240]    Physical Exam:  General: Well Developed, Well Nourished, No Acute Distress, Alert & Oriented x 3, Appropriate mood  Neck: supple, no JVD  Heart: S1S2 with RRR without murmurs or gallops  Lungs: mostly clear throughout auscultation bilaterally, a few scattered crackles  Abd: soft, nontender, nondistended, with good bowel sounds  Ext: no edema bilaterally  Sternal incision: clean, dry, and intact  Skin: warm and dry    LABS  Data Review:   Recent Labs     09/06/19  0438 09/05/19  0308  09/04/19  1144   NA  --  146*  --  146*   K 3.8 4.1   < > 3.4*   MG 2.5* 2.7*   < > 3.5*   BUN  --  16  --  12   CREA  --  0.37*  --  0.46*   GLU  --  99  --  126*   WBC 13.6* 10.9  --  19.7*   HGB 9.2* 7.2*   < > 9.0*   HCT 29.9* 23.9*   < > 29.7*    163  --  170   INR  --   --   --  1.5    < > = values in this interval not displayed. Estimated Creatinine Clearance: 132.8 mL/min (A) (based on SCr of 0.37 mg/dL (L)).       Assessment/Plan:     Principal Problem:    S/P AVR (aortic valve replacement) (9/4/2019)  Hgb improved, on ASA, BB, nasal rocket in for epistaxis, pacing wires removed, appreciate ENT assistance     Active Problems:    Essential hypertension, benign (3/12/2009)  BP stable        Aortic stenosis (9/4/2019)  S/p AVR       Hypoxia (9/4/2019)  On O2       Encounter for weaning from ventilator ([address] (9/4/2019)          Epistaxis (9/4/2019)  Nasal rocket in place, appreciate ENT assistance       Atelectasis, left (9/5/2019)          Acute blood loss anemia (9/5/2019)  Improved s/p transfusion, continue to monitor        [facility]

## 2019-09-07 LAB
ABO + RH BLD: NORMAL
BLD PROD TYP BPU: NORMAL
BLOOD GROUP ANTIBODIES SERPL: NORMAL
BPU ID: NORMAL
CROSSMATCH RESULT,%XM: NORMAL
ERYTHROCYTE [DISTWIDTH] IN BLOOD BY AUTOMATED COUNT: 13.6 % (ref 11.9–14.6)
HCT VFR BLD AUTO: 30.1 % (ref 35.8–46.3)
HGB BLD-MCNC: 9.4 G/DL (ref 11.7–15.4)
MAGNESIUM SERPL-MCNC: 2.3 MG/DL (ref 1.8–2.4)
MCH RBC QN AUTO: 30.1 PG (ref 26.1–32.9)
MCHC RBC AUTO-ENTMCNC: 31.2 G/DL (ref 31.4–35)
MCV RBC AUTO: 96.5 FL (ref 79.6–97.8)
MM INDURATION POC: 0 MM (ref 0–5)
NRBC # BLD: 0 K/UL (ref 0–0.2)
PLATELET # BLD AUTO: 189 K/UL (ref 150–450)
PMV BLD AUTO: 9.4 FL (ref 9.4–12.3)
POTASSIUM SERPL-SCNC: 3.8 MMOL/L (ref 3.5–5.1)
PPD POC: NEGATIVE NEGATIVE
RBC # BLD AUTO: 3.12 M/UL (ref 4.05–5.2)
SPECIMEN EXP DATE BLD: NORMAL
STATUS OF UNIT,%ST: NORMAL
UNIT DIVISION, %UDIV: 0
WBC # BLD AUTO: 11.1 K/UL (ref 4.3–11.1)

## 2019-09-07 PROCEDURE — 97530 THERAPEUTIC ACTIVITIES: CPT

## 2019-09-07 PROCEDURE — 94640 AIRWAY INHALATION TREATMENT: CPT

## 2019-09-07 PROCEDURE — 77030012890

## 2019-09-07 PROCEDURE — 74011250637 HC RX REV CODE- 250/637: Performed by: PHYSICIAN ASSISTANT

## 2019-09-07 PROCEDURE — 97110 THERAPEUTIC EXERCISES: CPT

## 2019-09-07 PROCEDURE — 74011000250 HC RX REV CODE- 250: Performed by: INTERNAL MEDICINE

## 2019-09-07 PROCEDURE — 36415 COLL VENOUS BLD VENIPUNCTURE: CPT

## 2019-09-07 PROCEDURE — 65660000004 HC RM CVT STEPDOWN

## 2019-09-07 PROCEDURE — 84132 ASSAY OF SERUM POTASSIUM: CPT

## 2019-09-07 PROCEDURE — 83735 ASSAY OF MAGNESIUM: CPT

## 2019-09-07 PROCEDURE — 85027 COMPLETE CBC AUTOMATED: CPT

## 2019-09-07 PROCEDURE — 94760 N-INVAS EAR/PLS OXIMETRY 1: CPT

## 2019-09-07 RX ORDER — AMOXICILLIN 250 MG
2 CAPSULE ORAL
Status: DISCONTINUED | OUTPATIENT
Start: 2019-09-07 | End: 2019-09-09 | Stop reason: HOSPADM

## 2019-09-07 RX ADMIN — AMIODARONE HYDROCHLORIDE 200 MG: 200 TABLET ORAL at 08:39

## 2019-09-07 RX ADMIN — POTASSIUM CHLORIDE 20 MEQ: 20 TABLET, EXTENDED RELEASE ORAL at 19:08

## 2019-09-07 RX ADMIN — FAMOTIDINE 20 MG: 20 TABLET ORAL at 19:08

## 2019-09-07 RX ADMIN — ACETAMINOPHEN 650 MG: 325 TABLET, FILM COATED ORAL at 08:39

## 2019-09-07 RX ADMIN — ALBUTEROL SULFATE 2.5 MG: 2.5 SOLUTION RESPIRATORY (INHALATION) at 16:05

## 2019-09-07 RX ADMIN — ASPIRIN 81 MG 81 MG: 81 TABLET ORAL at 08:40

## 2019-09-07 RX ADMIN — POTASSIUM CHLORIDE 20 MEQ: 20 TABLET, EXTENDED RELEASE ORAL at 06:02

## 2019-09-07 RX ADMIN — FUROSEMIDE 40 MG: 40 TABLET ORAL at 08:39

## 2019-09-07 RX ADMIN — MUPIROCIN: 20 OINTMENT TOPICAL at 22:05

## 2019-09-07 RX ADMIN — Medication 10 ML: at 11:40

## 2019-09-07 RX ADMIN — ACETAMINOPHEN 650 MG: 325 TABLET, FILM COATED ORAL at 12:53

## 2019-09-07 RX ADMIN — METOPROLOL TARTRATE 25 MG: 25 TABLET ORAL at 22:04

## 2019-09-07 RX ADMIN — Medication 10 ML: at 05:25

## 2019-09-07 RX ADMIN — POTASSIUM CHLORIDE 10 MEQ: 10 TABLET, EXTENDED RELEASE ORAL at 08:38

## 2019-09-07 RX ADMIN — FAMOTIDINE 20 MG: 20 TABLET ORAL at 08:38

## 2019-09-07 RX ADMIN — PRAVASTATIN SODIUM 40 MG: 20 TABLET ORAL at 22:03

## 2019-09-07 RX ADMIN — ALBUTEROL SULFATE 2.5 MG: 2.5 SOLUTION RESPIRATORY (INHALATION) at 20:21

## 2019-09-07 RX ADMIN — METOPROLOL TARTRATE 12.5 MG: 25 TABLET ORAL at 11:39

## 2019-09-07 RX ADMIN — ACETAMINOPHEN 650 MG: 325 TABLET, FILM COATED ORAL at 22:04

## 2019-09-07 RX ADMIN — Medication 10 ML: at 22:04

## 2019-09-07 RX ADMIN — MUPIROCIN: 20 OINTMENT TOPICAL at 08:40

## 2019-09-07 RX ADMIN — AMIODARONE HYDROCHLORIDE 200 MG: 200 TABLET ORAL at 22:04

## 2019-09-07 RX ADMIN — Medication 10 ML: at 19:08

## 2019-09-07 RX ADMIN — METOPROLOL TARTRATE 12.5 MG: 25 TABLET ORAL at 08:40

## 2019-09-07 NOTE — PROGRESS NOTES
Problem: Mobility Impaired (Adult and Pediatric)  Goal: *Acute Goals and Plan of Care (Insert Text)  Description  Discharge Goals:  (1.)Ms. Prasanna Whipple will move from supine to sit and sit to supine  with INDEPENDENT within 7 treatment day(s). (2.)Ms. Prasanna Whipple will transfer from bed to chair and chair to bed with INDEPENDENT using the least restrictive device within 7 treatment day(s). (3.)Ms. Prasanna Whipple will ambulate with INDEPENDENT for 500+ feet with the least restrictive device within 7 treatment day(s). ________________________________________________________________________________________________     Outcome: Progressing Towards Goal       PHYSICAL THERAPY: Daily Note and AM 9/7/2019  INPATIENT: PT Visit Days : 3  Payor: [address] [place] [address],9D / Plan: Ke Ltitle / Product Type: Managed Care Medicare /       NAME/AGE/GENDER: Quang Gandara is a 71 y.o. female   PRIMARY DIAGNOSIS: Nonrheumatic aortic valve stenosis [I35.0]  Aortic stenosis [I35.0] S/P AVR (aortic valve replacement)   S/P AVR (aortic valve replacement)    Procedure(s) (LRB):  AORTIC VALVE REPLACEMENT (AVR) (N/A)  ESOPHAGEAL TRANS ECHOCARDIOGRAM (N/A)  3 Days Post-Op  ICD-10: Treatment Diagnosis:    · Generalized Muscle Weakness (M62.81)  · Difficulty in walking, Not elsewhere classified (R26.2)  · History of falling (Z91.81)   Precaution/Allergies:  Milk containing products; Adhesive; Other medication; and Sulfa (sulfonamide antibiotics)   Sternal precautions     ASSESSMENT:     Ms. Prasanna Whipple is sitting up in bedside chair upon contact and agreeable to PT treatment this am.  Pt is A&O X 4. Pt lives with her  in 1 story home with 1 step to enter. Pt is independent with gait and ADLs and history of 1 fall approximately 2 weeks ago. Pt reports she had noticed a decline in her function and mobility over past 1-2 months. Pt enjoys participating in water aerobics 2 days per week.    AM- patient sit to stand with stand by assist.  Patient request to use the bathroom first.  She did quite well in the bathroom. Gait training with rolling walker x 300 feet, ascend and descend steps with 2 rails with slow jorge. Patient is returned to the recliner and after a short rest break the patient is instructed in the therapeutic exercises. Good effort this session. Patient is making progress towards goals. Pt left sitting up with all needs met and within reach. Glen Mercado will benefit from skilled PT (medically necessary) to address decreased strength, decreased balance, decreased functional tolerance, decreased cardiopulmonary endurance affecting participation in basic ADLs and functional tasks. Will continue PT efforts. Home with HHPT when ready for discharge. This section established at most recent assessment   PROBLEM LIST (Impairments causing functional limitations):  1. Decreased Strength  2. Decreased ADL/Functional Activities  3. Decreased Transfer Abilities  4. Decreased Ambulation Ability/Technique  5. Decreased Balance  6. Increased Pain  7. Decreased Activity Tolerance  8. Decreased Pacing Skills  9. Increased Fatigue  10. Increased Shortness of Breath  11. Decreased Knowledge of Precautions  12. Decreased [place] with Home Exercise Program   INTERVENTIONS PLANNED: (Benefits and precautions of physical therapy have been discussed with the patient.)  1. Balance Exercise  2. Bed Mobility  3. Family Education  4. Gait Training  5. Home Exercise Program (HEP)  6. Neuromuscular Re-education/Strengthening  7. Range of Motion (ROM)  8. Therapeutic Activites  9. Therapeutic Exercise/Strengthening  10.  Transfer Training     TREATMENT PLAN: Frequency/Duration: twice daily for duration of hospital stay  Rehabilitation Potential For Stated Goals: Excellent     REHAB RECOMMENDATIONS (at time of discharge pending progress):    Placement: It is my opinion, based on this patient's performance to date, that Ms. Mae Dang may benefit from [address] after discharge due to the functional deficits listed above that are likely to improve with skilled rehabilitation because he/she has multiple medical issues that affect his/her functional mobility in the community.   Equipment:    TBD, may need RW at d/c               HISTORY:   History of Present Injury/Illness (Reason for Referral):  S/p Procedure(s) (LRB):  AORTIC VALVE REPLACEMENT (AVR) (N/A)  ESOPHAGEAL TRANS ECHOCARDIOGRAM (N/A)  Past Medical History/Comorbidities:   Ms. Mae Dang  has a past medical history of Abnormal weight gain, Acute bronchitis, Acute, but ill-defined, cerebrovascular disease (4/20/2015), Adjustment disorder with depressed mood, Allergic rhinitis, cause unspecified (4/21/2015), Anxiety and depression, Aortic stenosis, Chest pressure, Coronary atherosclerosis of unspecified type of vessel, native or graft (2010), Disorder of bone and cartilage, unspecified (4/21/2015), Encounter for long-term (current) use of other medications, Former smoker, stopped smoking in distant past, GERD (gastroesophageal reflux disease), H/O heart artery stent (2009), Herpes zoster without mention of complication, History of stroke (at age 28), Hypercholesteremia, Hypertension, Long term current use of anticoagulant, Lymphoma ([facility] [address] Mixed incontinence urge and stress (male)(female) (4/21/2015), Osteoarthritis, generalized, Other and unspecified hyperlipidemia, Overweight(278.02), Pleurisy without mention of effusion or current tuberculosis, PONV (postoperative nausea and vomiting), Premature menopause, Rheumatoid arthritis(714.0), SOB (shortness of breath), Stroke ([address] (0546), Systolic murmur (2/25/8310), Unspecified asthma(493.90) (04/21/2015), Unspecified disorder of skin and subcutaneous tissue, and Unspecified menopausal and postmenopausal disorder. She also has no past medical history of Other ill-defined conditions(799.89). Ms. Zan Ball  has a past surgical history that includes hx coronary stent placement (2009); hx shoulder arthroscopy (Right); hx rotator cuff repair (Left); hx urological (2008); hx hysterectomy (1998); hx cholecystectomy (1988); hx cataract removal (Bilateral); vascular surgery procedure unlist (Right); hx heart catheterization (08/20/2019); and hx aortic valve replacement (09/04/2019). Social History/Living Environment:   Home Environment: Private residence  # Steps to Enter: 1  One/Two Story Residence: One story  Living Alone: No  Support Systems: Spouse/Significant Other/Partner  Patient Expects to be Discharged to[de-identified] Private residence  Current DME Used/Available at Home: None  Prior Level of Function/Work/Activity:  Independent with all mobility, decline in function over past 1-2 months, typically very active   Number of Personal Factors/Comorbidities that affect the Plan of Care: 3+: HIGH COMPLEXITY   EXAMINATION:   Most Recent Physical Functioning:   Gross Assessment:                  Posture:     Balance:    Bed Mobility:     Wheelchair Mobility:     Transfers:  Sit to Stand: Stand-by assistance  Stand to Sit: Contact guard assistance  Gait:     Speed/Radha: Slow  Distance (ft): 300 Feet (ft)(ascend and descend 3 steps )  Assistive Device: Walker, rolling  Ambulation - Level of Assistance: Contact guard assistance      Body Structures Involved:  1. Nerves  2. Heart  3. Lungs  4. Bones  5. Joints  6. Muscles  7. Ligaments Body Functions Affected:  1. Sensory/Pain  2. Cardio  3. Respiratory  4. Neuromusculoskeletal  5. Movement Related Activities and Participation Affected:  1. General Tasks and Demands  2. Mobility  3. Self Care  4. Domestic Life  5. Interpersonal Interactions and Relationships  6.  Community, Social and [place] Number of elements that affect the Plan of Care: 4+: HIGH COMPLEXITY   CLINICAL PRESENTATION:   Presentation: Evolving clinical presentation with changing clinical characteristics: MODERATE COMPLEXITY   CLINICAL DECISION MAKIN [facility] Mobility Inpatient Short Form  How much difficulty does the patient currently have. .. Unable A Lot A Little None   1. Turning over in bed (including adjusting bedclothes, sheets and blankets)? ? 1   ? 2   ? 3   ? 4   2. Sitting down on and standing up from a chair with arms ( e.g., wheelchair, bedside commode, etc.)   ? 1   ? 2   ? 3   ? 4   3. Moving from lying on back to sitting on the side of the bed?   ? 1   ? 2   ? 3   ? 4   How much help from another person does the patient currently need. .. Total A Lot A Little None   4. Moving to and from a bed to a chair (including a wheelchair)? ? 1   ? 2   ? 3   ? 4   5. Need to walk in hospital room? ? 1   ? 2   ? 3   ? 4   6. Climbing 3-5 steps with a railing? ? 1   ? 2   ? 3   ? 4   © , Trustees of [facility] [employer], under license to [employer]. All rights reserved      Score:  Initial: 18 Most Recent: X (Date: -- )    Interpretation of Tool:  Represents activities that are increasingly more difficult (i.e. Bed mobility, Transfers, Gait). Medical Necessity:     · Patient is expected to demonstrate progress in strength, balance, coordination and functional technique  ·  to decrease assistance required with gait, transfers, and functional mobility   · . Reason for Services/Other Comments:  · Patient continues to require skilled intervention due to decreased strength, decreased balance, decreased functional tolerance, decreased cardiopulmonary endurance affecting participation in basic ADLs and functional tasks.       · .   Use of outcome tool(s) and clinical judgement create a POC that gives a: Clear prediction of patient's progress: LOW COMPLEXITY            TREATMENT:   (In addition to Assessment/Re-Assessment sessions the following treatments were rendered)   Pre-treatment Symptoms/Complaints:  \"I'm ready\"  Pain: Initial:      Post Session:  0/10     Therapeutic Activity: (    15 minutes): Therapeutic activities including Chair transfers, Ambulation on level ground and cues for ease and safety of transfers, pacing techniques, education on sternal precautions  to improve mobility, strength, balance and coordination. Required minimal   to promote static and dynamic balance in standing and promote coordination of bilateral, upper extremity(s), lower extremity(s). Therapeutic Exercise: ( 10 minutes):  Exercises per grid below to improve mobility, strength, balance and coordination. Required minimum  verbal cues to promote proper body breathing techniques. Progressed repetitions and complexity of movement as indicated. Date:  9/6/19 Date:  9-7-19 Date:     ACTIVITY/EXERCISE AM PM AM PM AM PM   LAQ 15 15 20 B      Shoulder shrugs 15 15       Gluteal sets 15 15 20 B      marching 15 15 20 B      Ankle pumps 15 15 20 B      abductions 15 15 20 B               B = bilateral; AA = active assistive; A = active; P = passive      Braces/Orthotics/Lines/Etc:   · Room air  Treatment/Session Assessment:    · Response to Treatment:  amb 300 ft with RW and CGA  · Interdisciplinary Collaboration:   o Physical Therapy Assistant  o Registered Nurse  · After treatment position/precautions:   o Up in chair  o Bed alarm/tab alert on  o Bed/Chair-wheels locked  o Bed in low position  o Call light within reach   · Compliance with Program/Exercises: Compliant all of the time  · Recommendations/Intent for next treatment session: \"Next visit will focus on advancements to more challenging activities and reduction in assistance provided\".   Total Treatment Duration: 25 minutes  PT Patient Time In/Time Out  Time In: 0856  Time Out: [address], PTA

## 2019-09-07 NOTE — PROGRESS NOTES
Today's Date: 9/7/2019  Date of Admission: 9/4/2019    Chart Reviewed. Subjective / Interval Events:     Patient feels better today. She denies any dyspnea. Off oxygen. Had BM. No epistaxis. Medications Reviewed. Objective:     Vitals:    09/06/19 2136 09/07/19 0000 09/07/19 0341 09/07/19 0500   BP:  132/58 130/71    Pulse:  71 71    Resp:  18 18    Temp:  98 °F (36.7 °C) 97 °F (36.1 °C)    SpO2: 95% 93% 94%    Weight:    69 kg (152 lb 1.6 oz)   Height:           Intake and Output  Current Shift: 09/06 1901 - 09/07 0700  In: 120 [P.O.:120]  Out: 700 [Urine:700]   Last 3 Shifts: 09/05 0701 - 09/06 1900  In: 2385 [P.O.:1640; I.V.:125]  Out: 1915 [Urine:1875]    Physical Exam:  General: Well Developed, Well Nourished, No Acute Distress, Alert & Oriented x 3, Appropriate mood  Neck: supple, no JVD  Heart: S1S2 with RRR without murmurs or gallops  Lungs: mostly clear throughout auscultation bilaterally, a few scattered crackles  Abd: soft, nontender, nondistended, with good bowel sounds  Ext: no edema bilaterally  Sternal incision: clean, dry, and intact  Skin: warm and dry    LABS  Data Review:   Recent Labs     09/07/19  0501 09/06/19  0438 09/05/19  0308  09/04/19  1144   NA  --   --  146*  --  146*   K 3.8 3.8 4.1   < > 3.4*   MG 2.3 2.5* 2.7*   < > 3.5*   BUN  --   --  16  --  12   CREA  --   --  0.37*  --  0.46*   GLU  --   --  99  --  126*   WBC 11.1 13.6* 10.9  --  19.7*   HGB 9.4* 9.2* 7.2*   < > 9.0*   HCT 30.1* 29.9* 23.9*   < > 29.7*    173 163  --  170   INR  --   --   --   --  1.5    < > = values in this interval not displayed. Estimated Creatinine Clearance: 128.9 mL/min (A) (based on SCr of 0.37 mg/dL (L)).       Assessment/Plan:     Principal Problem:    S/P AVR (aortic valve replacement) (9/4/2019)  Hgb improved, on ASA, BB, nasal rocket in for epistaxis, pacing wires removed, appreciate ENT assistance     Active Problems:    Essential hypertension, benign (3/12/2009)  BP stable        Aortic stenosis (9/4/2019)  S/p AVR       Hypoxia (9/4/2019)  On O2       Encounter for weaning from ventilator [facility]) (9/4/2019)          Epistaxis (9/4/2019)  Nasal rocket out, appreciate ENT assistance.       Atelectasis, left (9/5/2019)          Acute blood loss anemia (9/5/2019)  Improved s/p transfusion, continue to monitor        Donna Scott MD

## 2019-09-08 PROBLEM — R06.2 WHEEZING: Status: ACTIVE | Noted: 2019-09-08

## 2019-09-08 LAB
ERYTHROCYTE [DISTWIDTH] IN BLOOD BY AUTOMATED COUNT: 13.7 % (ref 11.9–14.6)
HCT VFR BLD AUTO: 29.4 % (ref 35.8–46.3)
HGB BLD-MCNC: 9.1 G/DL (ref 11.7–15.4)
MAGNESIUM SERPL-MCNC: 2 MG/DL (ref 1.8–2.4)
MCH RBC QN AUTO: 29.9 PG (ref 26.1–32.9)
MCHC RBC AUTO-ENTMCNC: 31 G/DL (ref 31.4–35)
MCV RBC AUTO: 96.7 FL (ref 79.6–97.8)
NRBC # BLD: 0 K/UL (ref 0–0.2)
PLATELET # BLD AUTO: 223 K/UL (ref 150–450)
PMV BLD AUTO: 9.2 FL (ref 9.4–12.3)
POTASSIUM SERPL-SCNC: 3.9 MMOL/L (ref 3.5–5.1)
RBC # BLD AUTO: 3.04 M/UL (ref 4.05–5.2)
WBC # BLD AUTO: 8.2 K/UL (ref 4.3–11.1)

## 2019-09-08 PROCEDURE — 74011250637 HC RX REV CODE- 250/637: Performed by: THORACIC SURGERY (CARDIOTHORACIC VASCULAR SURGERY)

## 2019-09-08 PROCEDURE — 83735 ASSAY OF MAGNESIUM: CPT

## 2019-09-08 PROCEDURE — 74011000250 HC RX REV CODE- 250: Performed by: INTERNAL MEDICINE

## 2019-09-08 PROCEDURE — 94760 N-INVAS EAR/PLS OXIMETRY 1: CPT

## 2019-09-08 PROCEDURE — 85027 COMPLETE CBC AUTOMATED: CPT

## 2019-09-08 PROCEDURE — 97530 THERAPEUTIC ACTIVITIES: CPT

## 2019-09-08 PROCEDURE — 74011250637 HC RX REV CODE- 250/637: Performed by: PHYSICIAN ASSISTANT

## 2019-09-08 PROCEDURE — 84132 ASSAY OF SERUM POTASSIUM: CPT

## 2019-09-08 PROCEDURE — 94640 AIRWAY INHALATION TREATMENT: CPT

## 2019-09-08 PROCEDURE — 77030012890

## 2019-09-08 PROCEDURE — 36415 COLL VENOUS BLD VENIPUNCTURE: CPT

## 2019-09-08 PROCEDURE — 97110 THERAPEUTIC EXERCISES: CPT

## 2019-09-08 PROCEDURE — 99232 SBSQ HOSP IP/OBS MODERATE 35: CPT | Performed by: INTERNAL MEDICINE

## 2019-09-08 PROCEDURE — 65660000004 HC RM CVT STEPDOWN

## 2019-09-08 RX ORDER — FLUTICASONE PROPIONATE 50 MCG
2 SPRAY, SUSPENSION (ML) NASAL DAILY
Status: DISCONTINUED | OUTPATIENT
Start: 2019-09-09 | End: 2019-09-09 | Stop reason: HOSPADM

## 2019-09-08 RX ORDER — ALBUTEROL SULFATE 0.83 MG/ML
2.5 SOLUTION RESPIRATORY (INHALATION)
Status: DISCONTINUED | OUTPATIENT
Start: 2019-09-08 | End: 2019-09-09 | Stop reason: HOSPADM

## 2019-09-08 RX ADMIN — AMIODARONE HYDROCHLORIDE 200 MG: 200 TABLET ORAL at 08:01

## 2019-09-08 RX ADMIN — FAMOTIDINE 20 MG: 20 TABLET ORAL at 17:19

## 2019-09-08 RX ADMIN — Medication 10 ML: at 05:04

## 2019-09-08 RX ADMIN — POTASSIUM CHLORIDE 20 MEQ: 20 TABLET, EXTENDED RELEASE ORAL at 08:02

## 2019-09-08 RX ADMIN — Medication 10 ML: at 12:22

## 2019-09-08 RX ADMIN — METOPROLOL TARTRATE 25 MG: 25 TABLET ORAL at 19:26

## 2019-09-08 RX ADMIN — METOPROLOL TARTRATE 25 MG: 25 TABLET ORAL at 08:01

## 2019-09-08 RX ADMIN — FAMOTIDINE 20 MG: 20 TABLET ORAL at 08:01

## 2019-09-08 RX ADMIN — ALBUTEROL SULFATE 2.5 MG: 2.5 SOLUTION RESPIRATORY (INHALATION) at 08:12

## 2019-09-08 RX ADMIN — AMIODARONE HYDROCHLORIDE 200 MG: 200 TABLET ORAL at 19:26

## 2019-09-08 RX ADMIN — ACETAMINOPHEN 650 MG: 325 TABLET, FILM COATED ORAL at 07:54

## 2019-09-08 RX ADMIN — ALBUTEROL SULFATE 2.5 MG: 2.5 SOLUTION RESPIRATORY (INHALATION) at 19:50

## 2019-09-08 RX ADMIN — Medication 10 ML: at 19:26

## 2019-09-08 RX ADMIN — ALBUTEROL SULFATE 2.5 MG: 2.5 SOLUTION RESPIRATORY (INHALATION) at 16:15

## 2019-09-08 RX ADMIN — ACETAMINOPHEN 650 MG: 325 TABLET, FILM COATED ORAL at 12:19

## 2019-09-08 RX ADMIN — PRAVASTATIN SODIUM 40 MG: 20 TABLET ORAL at 19:26

## 2019-09-08 RX ADMIN — ACETAMINOPHEN 650 MG: 325 TABLET, FILM COATED ORAL at 19:26

## 2019-09-08 RX ADMIN — ASPIRIN 81 MG 81 MG: 81 TABLET ORAL at 08:01

## 2019-09-08 RX ADMIN — POTASSIUM CHLORIDE 20 MEQ: 20 TABLET, EXTENDED RELEASE ORAL at 06:16

## 2019-09-08 RX ADMIN — FUROSEMIDE 40 MG: 40 TABLET ORAL at 08:01

## 2019-09-08 RX ADMIN — POTASSIUM CHLORIDE 10 MEQ: 10 TABLET, EXTENDED RELEASE ORAL at 08:01

## 2019-09-08 RX ADMIN — FLUTICASONE PROPIONATE 2 SPRAY: 50 SPRAY, METERED NASAL at 14:17

## 2019-09-08 NOTE — PROGRESS NOTES
Berna Quintanilla  Admission Date: 9/4/2019             Daily Progress Note: 9/8/2019    The patient's chart is reviewed and the patient is discussed with the staff. Patient had AVR.  Currently is sedated in CV-ICU and orally intubated receiving  mechanical ventilation.  Was seen for exertional chest discomfort with worsening fatigue and murmur noted.  ECHO with severe AS and LHC performed showing mild CAD.  Intraoperatively had epistaxis from right nare related to temperature probe.     Has chronic lymphoma and followed by oncology, hx CVA 1985, Hx CAD with stent LAD 2009, quit smoking 1998, anxiety, depression, HTN, HLD  Subjective:      We had signed off on 9/6 but asked to see again by the nurses due to increased wheezing noted mainly when she is walking  The pt is off O2    Current Facility-Administered Medications   Medication Dose Route Frequency    [START ON 9/9/2019] fluticasone propionate (FLONASE) 50 mcg/actuation nasal spray 2 Spray  2 Spray Both Nostrils DAILY    senna-docusate (PERICOLACE) 8.6-50 mg per tablet 2 Tab  2 Tab Oral DAILY PRN    lip protectant (BLISTEX) ointment 1 Each  1 Each Topical PRN    pravastatin (PRAVACHOL) tablet 40 mg  40 mg Oral QHS    albuterol (PROVENTIL VENTOLIN) nebulizer solution 2.5 mg  2.5 mg Nebulization Q4H PRN    magnesium hydroxide (MILK OF MAGNESIA) 400 mg/5 mL oral suspension 30 mL  30 mL Oral DAILY PRN    alum-mag hydroxide-simeth (MYLANTA) oral suspension 30 mL  30 mL Oral Q4H PRN    famotidine (PEPCID) tablet 20 mg  20 mg Oral BID    acetaminophen (TYLENOL) tablet 650 mg  650 mg Oral Q4H PRN    traMADol (ULTRAM) tablet 50 mg  50 mg Oral Q6H PRN    magnesium oxide (MAG-OX) tablet 400 mg  400 mg Oral TID PRN    magnesium oxide (MAG-OX) tablet 400 mg  400 mg Oral QID PRN    potassium chloride (K-DUR, KLOR-CON) SR tablet 20 mEq  20 mEq Oral BID PRN    potassium chloride (K-DUR, KLOR-CON) SR tablet 40 mEq  40 mEq Oral BID PRN  influenza vaccine 2019-20 (6 mos+)(PF) (FLUARIX/FLULAVAL/FLUZONE QUAD) injection 0.5 mL  0.5 mL IntraMUSCular PRIOR TO DISCHARGE    oxyCODONE-acetaminophen (PERCOCET) 5-325 mg per tablet 1 Tab  1 Tab Oral Q4H PRN    sodium chloride (NS) flush 5-40 mL  5-40 mL IntraVENous Q8H    sodium chloride (NS) flush 5-40 mL  5-40 mL IntraVENous PRN    amiodarone (CORDARONE) tablet 200 mg  200 mg Oral Q12H    metoprolol tartrate (LOPRESSOR) tablet 25 mg  25 mg Oral Q12H    aspirin chewable tablet 81 mg  81 mg Oral DAILY    ondansetron (ZOFRAN) injection 4 mg  4 mg IntraVENous Q6H PRN       Review of Systems    Constitutional: negative for fever, chills, sweats  Cardiovascular: negative for chest pain, palpitations, syncope, edema  Gastrointestinal:  negative for dysphagia, reflux, vomiting, diarrhea, abdominal pain, or melena  Neurologic:  negative for focal weakness, numbness, headache    Objective:     Vitals:    09/07/19 2300 09/08/19 0345 09/08/19 0712 09/08/19 0812   BP: 144/63 137/63 152/70    Pulse: 79 78 85    Resp: 18 18 20    Temp: 98.6 °F (37 °C) 98 °F (36.7 °C) 97.8 °F (36.6 °C)    SpO2: 95% 93% 97% 97%   Weight:  157 lb 3.2 oz (71.3 kg)     Height:             Intake/Output Summary (Last 24 hours) at 9/8/2019 1304  Last data filed at 9/8/2019 0940  Gross per 24 hour   Intake 470 ml   Output 2500 ml   Net -2030 ml       Physical Exam:   Constitution:  the patient is well developed and in no acute distress  EENMT:  Sclera clear, pupils equal, oral mucosa moist  Respiratory:  Minimal wheezes at rest  Cardiovascular:  RRR without M,G,R  Gastrointestinal: soft and non-tender; with positive bowel sounds. Musculoskeletal: warm without cyanosis. There is no lower extremity edema.   Skin:  no jaundice or rashes, sternal wounds   Neurologic: no gross neuro deficits     Psychiatric:  alert and oriented x 3    CXR:   None today    LAB  Recent Labs     09/06/19  0545 09/05/19 2005 09/05/19  1658   GLUCPOC 120* 142* 119*      Recent Labs     09/08/19  0505 09/07/19  0501 09/06/19  0438   WBC 8.2 11.1 13.6*   HGB 9.1* 9.4* 9.2*   HCT 29.4* 30.1* 29.9*    189 173     Recent Labs     09/08/19  0505 09/07/19  0501 09/06/19  0438   K 3.9 3.8 3.8   MG 2.0 2.3 2.5*     No results for input(s): PH, PCO2, PO2, HCO3, PHI, PCO2I, PO2I, HCO3I in the last 72 hours. No results for input(s): LCAD, LAC in the last 72 hours. Assessment:  (Medical Decision Making)     Hospital Problems  Date Reviewed: 8/28/2019          Codes Class Noted POA    Wheezing ICD-10-CM: R06.2  ICD-9-CM: 786.07  9/8/2019 Unknown    Mainly with activity    Atelectasis, left ICD-10-CM: J98.11  ICD-9-CM: 518.0  9/5/2019 Yes        Acute blood loss anemia ICD-10-CM: D62  ICD-9-CM: 285.1  9/5/2019 Yes        Aortic stenosis ICD-10-CM: I35.0  ICD-9-CM: 424.1  9/4/2019 Yes        * (Principal) S/P AVR (aortic valve replacement) ICD-10-CM: Z95.2  ICD-9-CM: V43.3  9/4/2019 No    Overview Signed 9/4/2019 12:26 PM by Leigha Gomez NP     9/4/19  Dr. Marshal Poon             Hypoxia ICD-10-CM: R09.02  ICD-9-CM: 799.02  9/4/2019 No    Off o2    Epistaxis ICD-10-CM: R04.0  ICD-9-CM: 784.7  9/4/2019 No        Essential hypertension, benign (Chronic) ICD-10-CM: I10  ICD-9-CM: 401.1  3/12/2009 Yes              Plan:  (Medical Decision Making)   1   Change neb rx to routine rather than prn  2   cxr tomorrow  --    More than 50% of the time documented was spent in face-to-face contact with the patient and in the care of the patient on the floor/unit where the patient is located.     Chiki Garrido MD

## 2019-09-08 NOTE — PROGRESS NOTES
Today's Date: 9/8/2019  Date of Admission: 9/4/2019    Chart Reviewed. Subjective / Interval Events:     Patient feels better today. She denies any dyspnea. Off oxygen. Had BM. No epistaxis. Doing well. Medications Reviewed. Objective:     Vitals:    09/07/19 1900 09/07/19 2021 09/07/19 2300 09/08/19 0345   BP: 144/64  144/63 137/63   Pulse: 88  79 78   Resp: 18  18 18   Temp: 98.6 °F (37 °C)  98.6 °F (37 °C) 98 °F (36.7 °C)   SpO2: 94% 98% 95% 93%   Weight:    71.3 kg (157 lb 3.2 oz)   Height:           Intake and Output  Current Shift: 09/07 1901 - 09/08 0700  In: 120 [P.O.:120]  Out: 1400 [Urine:1400]   Last 3 Shifts: 09/06 0701 - 09/07 1900  In: 1880 [P.O.:1880]  Out: 2550 [Urine:2550]    Physical Exam:  General: Well Developed, Well Nourished, No Acute Distress, Alert & Oriented x 3, Appropriate mood  Neck: supple, no JVD  Heart: S1S2 with RRR without murmurs or gallops  Lungs: mostly clear throughout auscultation bilaterally, a few scattered crackles  Abd: soft, nontender, nondistended, with good bowel sounds  Ext: no edema bilaterally  Sternal incision: clean, dry, and intact  Skin: warm and dry    LABS  Data Review:   Recent Labs     09/08/19  0505 09/07/19  0501   K 3.9 3.8   MG 2.0 2.3   WBC 8.2 11.1   HGB 9.1* 9.4*   HCT 29.4* 30.1*    189       Estimated Creatinine Clearance: 130.9 mL/min (A) (based on SCr of 0.37 mg/dL (L)).       Assessment/Plan:     Principal Problem:    S/P AVR (aortic valve replacement) (9/4/2019)  Hgb improved, on ASA, BB, nasal rocket in for epistaxis, pacing wires removed, appreciate ENT assistance     Active Problems:    Essential hypertension, benign (3/12/2009)  BP stable        Aortic stenosis (9/4/2019)  S/p AVR       Hypoxia (9/4/2019)  On O2       Encounter for weaning from ventilator ([address] (9/4/2019)          Epistaxis (9/4/2019)  Nasal rocket out, appreciate ENT assistance.       Atelectasis, left (9/5/2019)          Acute blood loss anemia (9/5/2019)  Improved s/p transfusion, continue to monitor      Probably home tomorrow.       Esther Marks MD

## 2019-09-08 NOTE — PROGRESS NOTES
Problem: Mobility Impaired (Adult and Pediatric)  Goal: *Acute Goals and Plan of Care (Insert Text)  Description  Discharge Goals:  (1.)Ms. Eric Milton will move from supine to sit and sit to supine  with INDEPENDENT within 7 treatment day(s). (2.)Ms. Eric Milton will transfer from bed to chair and chair to bed with INDEPENDENT using the least restrictive device within 7 treatment day(s). (3.)Ms. Eric Milton will ambulate with INDEPENDENT for 500+ feet with the least restrictive device within 7 treatment day(s). ________________________________________________________________________________________________     Outcome: Progressing Towards Goal       PHYSICAL THERAPY: Daily Note and AM 9/8/2019  INPATIENT: PT Visit Days : 4  Payor: [address] [place] [address],9D / Plan: [address] / Product Type: Managed Care Medicare /       NAME/AGE/GENDER: Lonnie Damico is a 71 y.o. female   PRIMARY DIAGNOSIS: Nonrheumatic aortic valve stenosis [I35.0]  Aortic stenosis [I35.0] S/P AVR (aortic valve replacement)   S/P AVR (aortic valve replacement)    Procedure(s) (LRB):  AORTIC VALVE REPLACEMENT (AVR) (N/A)  ESOPHAGEAL TRANS ECHOCARDIOGRAM (N/A)  4 Days Post-Op  ICD-10: Treatment Diagnosis:    · Generalized Muscle Weakness (M62.81)  · Difficulty in walking, Not elsewhere classified (R26.2)  · History of falling (Z91.81)   Precaution/Allergies:  Milk containing products; Adhesive; Other medication; and Sulfa (sulfonamide antibiotics)   Sternal precautions     ASSESSMENT:     Ms. Eric Milton is sitting up in bedside chair upon contact and agreeable to PT treatment this am.  Pt is A&O X 4. Pt lives with her  in 1 story home with 1 step to enter. Pt is independent with gait and ADLs and history of 1 fall approximately 2 weeks ago. Pt reports she had noticed a decline in her function and mobility over past 1-2 months. Pt enjoys participating in water aerobics 2 days per week.    AM- patient performed therapeutic exercises first then ambulated 325 ft with no AD. Also ascend and descended 3 steps with 2 rails. Patient is returned to the recliner with all needs in reach and RN notified. Good effort this session. Patient is making progress towards goals. Damien Butt will benefit from skilled PT (medically necessary) to address decreased strength, decreased balance, decreased functional tolerance, decreased cardiopulmonary endurance affecting participation in basic ADLs and functional tasks. Will continue PT efforts. Home with HHPT when ready for discharge. This section established at most recent assessment   PROBLEM LIST (Impairments causing functional limitations):  1. Decreased Strength  2. Decreased ADL/Functional Activities  3. Decreased Transfer Abilities  4. Decreased Ambulation Ability/Technique  5. Decreased Balance  6. Increased Pain  7. Decreased Activity Tolerance  8. Decreased Pacing Skills  9. Increased Fatigue  10. Increased Shortness of Breath  11. Decreased Knowledge of Precautions  12. Decreased Sac with Home Exercise Program   INTERVENTIONS PLANNED: (Benefits and precautions of physical therapy have been discussed with the patient.)  1. Balance Exercise  2. Bed Mobility  3. Family Education  4. Gait Training  5. Home Exercise Program (HEP)  6. Neuromuscular Re-education/Strengthening  7. Range of Motion (ROM)  8. Therapeutic Activites  9. Therapeutic Exercise/Strengthening  10. Transfer Training     TREATMENT PLAN: Frequency/Duration: twice daily for duration of hospital stay  Rehabilitation Potential For Stated Goals: Excellent     REHAB RECOMMENDATIONS (at time of discharge pending progress):    Placement:   It is my opinion, based on this patient's performance to date, that Ms. Tiffany Ybarra may benefit from [address] after discharge due to the functional deficits listed above that are likely to improve with skilled rehabilitation because he/she has multiple medical issues that affect his/her functional mobility in the community. Equipment:    TBD, may need RW at d/c               HISTORY:   History of Present Injury/Illness (Reason for Referral):  S/p Procedure(s) (LRB):  AORTIC VALVE REPLACEMENT (AVR) (N/A)  ESOPHAGEAL TRANS ECHOCARDIOGRAM (N/A)  Past Medical History/Comorbidities:   Ms. Tiffany Ybarra  has a past medical history of Abnormal weight gain, Acute bronchitis, Acute, but ill-defined, cerebrovascular disease (4/20/2015), Adjustment disorder with depressed mood, Allergic rhinitis, cause unspecified (4/21/2015), Anxiety and depression, Aortic stenosis, Chest pressure, Coronary atherosclerosis of unspecified type of vessel, native or graft (2010), Disorder of bone and cartilage, unspecified (4/21/2015), Encounter for long-term (current) use of other medications, Former smoker, stopped smoking in distant past, GERD (gastroesophageal reflux disease), H/O heart artery stent (2009), Herpes zoster without mention of complication, History of stroke (at age 28), Hypercholesteremia, Hypertension, Long term current use of anticoagulant, Lymphoma ([address] Mixed incontinence urge and stress (male)(female) (4/21/2015), Osteoarthritis, generalized, Other and unspecified hyperlipidemia, Overweight(278.02), Pleurisy without mention of effusion or current tuberculosis, PONV (postoperative nausea and vomiting), Premature menopause, Rheumatoid arthritis(714.0), SOB (shortness of breath), Stroke ([address] (0317), Systolic murmur (7/19/1640), Unspecified asthma(493.90) (04/21/2015), Unspecified disorder of skin and subcutaneous tissue, and Unspecified menopausal and postmenopausal disorder. She also has no past medical history of Other ill-defined conditions(799.89).   Ms. Tiffany Ybarra  has a past surgical history that includes hx coronary stent placement (2009); hx shoulder arthroscopy (Right); hx rotator cuff repair (Left); hx urological (2008); hx hysterectomy (1998); hx cholecystectomy (1988); hx cataract removal (Bilateral); vascular surgery procedure unlist (Right); hx heart catheterization (08/20/2019); and hx aortic valve replacement (09/04/2019). Social History/Living Environment:   Home Environment: Private residence  # Steps to Enter: 1  One/Two Story Residence: One story  Living Alone: No  Support Systems: Spouse/Significant Other/Partner  Patient Expects to be Discharged to[de-identified] Private residence  Current DME Used/Available at Home: None  Prior Level of Function/Work/Activity:  Independent with all mobility, decline in function over past 1-2 months, typically very active   Number of Personal Factors/Comorbidities that affect the Plan of Care: 3+: HIGH COMPLEXITY   EXAMINATION:   Most Recent Physical Functioning:   Gross Assessment:                  Posture:     Balance:  Sitting: Intact  Standing: Intact Bed Mobility:     Wheelchair Mobility:     Transfers:  Sit to Stand: Stand-by assistance  Stand to Sit: Contact guard assistance  Gait:     Speed/Radha: Slow  Distance (ft): 325 Feet (ft)  Assistive Device: Other (comment)(No AD)  Ambulation - Level of Assistance: Contact guard assistance  Interventions: Safety awareness training      Body Structures Involved:  1. Nerves  2. Heart  3. Lungs  4. Bones  5. Joints  6. Muscles  7. Ligaments Body Functions Affected:  1. Sensory/Pain  2. Cardio  3. Respiratory  4. Neuromusculoskeletal  5. Movement Related Activities and Participation Affected:  1. General Tasks and Demands  2. Mobility  3. Self Care  4. Domestic Life  5. Interpersonal Interactions and Relationships  6.  Community, Social and [place]   Number of elements that affect the Plan of Care: 4+: HIGH COMPLEXITY   CLINICAL PRESENTATION:   Presentation: Evolving clinical presentation with changing clinical characteristics: MODERATE COMPLEXITY   CLINICAL DECISION MAKIN[address] [place] [id number] AM-PAC 6 Clicks   Basic Mobility Inpatient Short Form  How much difficulty does the patient currently have. .. Unable A Lot A Little None   1. Turning over in bed (including adjusting bedclothes, sheets and blankets)? ? 1   ? 2   ? 3   ? 4   2. Sitting down on and standing up from a chair with arms ( e.g., wheelchair, bedside commode, etc.)   ? 1   ? 2   ? 3   ? 4   3. Moving from lying on back to sitting on the side of the bed?   ? 1   ? 2   ? 3   ? 4   How much help from another person does the patient currently need. .. Total A Lot A Little None   4. Moving to and from a bed to a chair (including a wheelchair)? ? 1   ? 2   ? 3   ? 4   5. Need to walk in hospital room? ? 1   ? 2   ? 3   ? 4   6. Climbing 3-5 steps with a railing? ? 1   ? 2   ? 3   ? 4   © , Trustees of [address] [place] [id number], under license to [employer]. All rights reserved      Score:  Initial: 18 Most Recent: X (Date: -- )    Interpretation of Tool:  Represents activities that are increasingly more difficult (i.e. Bed mobility, Transfers, Gait). Medical Necessity:     · Patient is expected to demonstrate progress in strength, balance, coordination and functional technique  ·  to decrease assistance required with gait, transfers, and functional mobility   · . Reason for Services/Other Comments:  · Patient continues to require skilled intervention due to decreased strength, decreased balance, decreased functional tolerance, decreased cardiopulmonary endurance affecting participation in basic ADLs and functional tasks.       · .   Use of outcome tool(s) and clinical judgement create a POC that gives a: Clear prediction of patient's progress: LOW COMPLEXITY            TREATMENT:   (In addition to Assessment/Re-Assessment sessions the following treatments were rendered)   Pre-treatment Symptoms/Complaints:  \"I'm ready and happy to get up and go this morning\". Pain: Initial:      Post Session:  0/10     Therapeutic Activity: (    13 minutes): Therapeutic activities including Chair transfers, Ambulation on level ground and cues for ease and safety of transfers, pacing techniques, education on sternal precautions  to improve mobility, strength, balance and coordination. Required minimal Safety awareness training to promote static and dynamic balance in standing and promote coordination of bilateral, upper extremity(s), lower extremity(s). Therapeutic Exercise: ( 10 minutes):  Exercises per grid below to improve mobility, strength, balance and coordination. Required minimum  verbal cues to promote proper body breathing techniques. Progressed repetitions and complexity of movement as indicated. Date:  9/6/19 Date:  9-7-19 Date:  9-8-19   ACTIVITY/EXERCISE AM PM AM PM AM PM   LAQ 15 15 20 B  X 20 B    Shoulder shrugs 15 15       Gluteal sets 15 15 20 B  X 20 B    marching 15 15 20 B  X 20 B    Ankle pumps 15 15 20 B  X 20 B    abductions 15 15 20 B  X 20 B             B = bilateral; AA = active assistive; A = active; P = passive      Braces/Orthotics/Lines/Etc:   · Room air  Treatment/Session Assessment:    · Response to Treatment:  amb 325 ft with no AD and CGA  · Interdisciplinary Collaboration:   o Physical Therapy Assistant  o Registered Nurse  · After treatment position/precautions:   o Up in chair  o Bed alarm/tab alert on  o Bed/Chair-wheels locked  o Bed in low position  o Call light within reach   · Compliance with Program/Exercises: Compliant all of the time  · Recommendations/Intent for next treatment session: \"Next visit will focus on advancements to more challenging activities and reduction in assistance provided\".   Total Treatment Duration: 25 minutes  PT Patient Time In/Time Out  Time In: 0929  Time Out: Danya 78, PTA

## 2019-09-09 ENCOUNTER — APPOINTMENT (OUTPATIENT)
Dept: GENERAL RADIOLOGY | Age: 69
DRG: 220 | End: 2019-09-09
Attending: INTERNAL MEDICINE
Payer: MEDICARE

## 2019-09-09 VITALS
OXYGEN SATURATION: 94 % | BODY MASS INDEX: 31.49 KG/M2 | HEIGHT: 59 IN | RESPIRATION RATE: 18 BRPM | WEIGHT: 156.2 LBS | DIASTOLIC BLOOD PRESSURE: 60 MMHG | SYSTOLIC BLOOD PRESSURE: 130 MMHG | TEMPERATURE: 97.8 F | HEART RATE: 91 BPM

## 2019-09-09 LAB
ERYTHROCYTE [DISTWIDTH] IN BLOOD BY AUTOMATED COUNT: 14.3 % (ref 11.9–14.6)
HCT VFR BLD AUTO: 32.6 % (ref 35.8–46.3)
HGB BLD-MCNC: 10.2 G/DL (ref 11.7–15.4)
MCH RBC QN AUTO: 30 PG (ref 26.1–32.9)
MCHC RBC AUTO-ENTMCNC: 31.3 G/DL (ref 31.4–35)
MCV RBC AUTO: 95.9 FL (ref 79.6–97.8)
NRBC # BLD: 0 K/UL (ref 0–0.2)
PLATELET # BLD AUTO: 295 K/UL (ref 150–450)
PMV BLD AUTO: 9.2 FL (ref 9.4–12.3)
POTASSIUM SERPL-SCNC: 4.1 MMOL/L (ref 3.5–5.1)
RBC # BLD AUTO: 3.4 M/UL (ref 4.05–5.2)
WBC # BLD AUTO: 10.2 K/UL (ref 4.3–11.1)

## 2019-09-09 PROCEDURE — 94760 N-INVAS EAR/PLS OXIMETRY 1: CPT

## 2019-09-09 PROCEDURE — 74011250637 HC RX REV CODE- 250/637: Performed by: PHYSICIAN ASSISTANT

## 2019-09-09 PROCEDURE — 77030012890

## 2019-09-09 PROCEDURE — 90686 IIV4 VACC NO PRSV 0.5 ML IM: CPT | Performed by: THORACIC SURGERY (CARDIOTHORACIC VASCULAR SURGERY)

## 2019-09-09 PROCEDURE — 74011250636 HC RX REV CODE- 250/636: Performed by: THORACIC SURGERY (CARDIOTHORACIC VASCULAR SURGERY)

## 2019-09-09 PROCEDURE — 94640 AIRWAY INHALATION TREATMENT: CPT

## 2019-09-09 PROCEDURE — 36415 COLL VENOUS BLD VENIPUNCTURE: CPT

## 2019-09-09 PROCEDURE — 71046 X-RAY EXAM CHEST 2 VIEWS: CPT

## 2019-09-09 PROCEDURE — 84132 ASSAY OF SERUM POTASSIUM: CPT

## 2019-09-09 PROCEDURE — 90471 IMMUNIZATION ADMIN: CPT

## 2019-09-09 PROCEDURE — 85027 COMPLETE CBC AUTOMATED: CPT

## 2019-09-09 PROCEDURE — 99232 SBSQ HOSP IP/OBS MODERATE 35: CPT | Performed by: INTERNAL MEDICINE

## 2019-09-09 PROCEDURE — 74011000250 HC RX REV CODE- 250: Performed by: INTERNAL MEDICINE

## 2019-09-09 RX ORDER — POTASSIUM CHLORIDE 20 MEQ/1
20 TABLET, EXTENDED RELEASE ORAL DAILY
Qty: 7 TAB | Refills: 0 | Status: SHIPPED | OUTPATIENT
Start: 2019-09-09 | End: 2019-09-16

## 2019-09-09 RX ORDER — ACETAMINOPHEN 325 MG/1
650 TABLET ORAL
Status: SHIPPED | COMMUNITY
Start: 2019-09-09

## 2019-09-09 RX ORDER — FUROSEMIDE 40 MG/1
40 TABLET ORAL DAILY
Qty: 7 TAB | Refills: 0 | Status: SHIPPED | OUTPATIENT
Start: 2019-09-09 | End: 2019-09-16

## 2019-09-09 RX ADMIN — Medication 10 ML: at 05:08

## 2019-09-09 RX ADMIN — INFLUENZA VIRUS VACCINE 0.5 ML: 15; 15; 15; 15 SUSPENSION INTRAMUSCULAR at 10:10

## 2019-09-09 RX ADMIN — METOPROLOL TARTRATE 25 MG: 25 TABLET ORAL at 09:07

## 2019-09-09 RX ADMIN — ALBUTEROL SULFATE 2.5 MG: 2.5 SOLUTION RESPIRATORY (INHALATION) at 05:34

## 2019-09-09 RX ADMIN — FLUTICASONE PROPIONATE 2 SPRAY: 50 SPRAY, METERED NASAL at 09:07

## 2019-09-09 RX ADMIN — FAMOTIDINE 20 MG: 20 TABLET ORAL at 09:07

## 2019-09-09 RX ADMIN — ACETAMINOPHEN 650 MG: 325 TABLET, FILM COATED ORAL at 09:09

## 2019-09-09 RX ADMIN — ASPIRIN 81 MG 81 MG: 81 TABLET ORAL at 09:06

## 2019-09-09 RX ADMIN — AMIODARONE HYDROCHLORIDE 200 MG: 200 TABLET ORAL at 09:06

## 2019-09-09 RX ADMIN — ACETAMINOPHEN 650 MG: 325 TABLET, FILM COATED ORAL at 05:26

## 2019-09-09 NOTE — DISCHARGE SUMMARY
Discharge Summary     Patient ID:  Berna Quintanilla  [id number]  78 y.o.  1950    Admit date: 9/4/2019    Discharge date:  9/9/2019    Admitting Physician: Triston Jarquin MD     Discharge Physician: JOEY [place]/Dr. Shira Moscoso     Admission Diagnoses: Nonrheumatic aortic valve stenosis [I35.0]  Aortic stenosis [I35.0]    Discharge Diagnoses:   Patient Active Problem List    Diagnosis Date Noted    Wheezing 09/08/2019    Atelectasis, left 09/05/2019    Acute blood loss anemia 09/05/2019    Aortic stenosis 09/04/2019    S/P AVR (aortic valve replacement) 09/04/2019    Hypoxia 09/04/2019    Epistaxis 09/04/2019    Nonrheumatic aortic valve stenosis 08/21/2019    Chest pain 08/19/2019    Fatigue 08/19/2019    Anemia 08/21/2018    Postmenopausal 08/16/2017    Colon polyp 08/16/2017    Diverticulosis of large intestine 08/16/2017    Dyslipidemia 10/18/2016    Bruit (arterial) 10/18/2016    Lymphoma ([address] 02/02/2016    Mixed incontinence urge and stress (male)(female) 04/21/2015    Osteopenia 04/21/2015    Asthma 04/21/2015    Allergic rhinitis 04/21/2015    Migraine 04/21/2015    History of CVA (cerebrovascular accident) 04/20/2015    Depression 04/20/2015    Essential hypertension, benign 03/12/2009    Coronary atherosclerosis of native coronary vessel 03/12/2009    S/P angioplasty with stent 03/12/2009       Procedures this admission:  Aortic valve replacement  Consults: Pulmonary/Intensive Care, ENT    Hospital Course: Patient presented for elective AVR. She presented to the ER in August with intermittent chest pain. Chest pain was occurring with exertion. She noted worsening fatigue as well. She was noted to have a murmur. Echo showed severe AS. She underwent LHC that showed mild non-obstructive CAD. She underwent BAM that confirmed severe AS with markedly reduced cuspal separation. Mean gradient was 59.82mmHg, peak gradient was 93mmHg.  She stated she is active at baseline. She had been doing water aerobics. Her and her  recently traveled to [facility] and [place]. She stated she had noted worsening fatigue over the last few months but did not understand why this was occurring. She denied any dizziness or lightheadedness. TAVR vs SAVR was discussed at length. She wished to proceed with surgical AVR. She was scheduled for AVR and discharged home. She called our office the following day and cancelled surgery. She had decided to proceed with TAVR. However, CT showed that her AV was heavily calcified. She was deemed to not be a TAVR candidate. She was seen back in the office and AVR was again scheduled. She underwent aortic valve replacement with a 25mm Inspiris Resilia Schwartz valve on 9/4/19. During surgery, when the temperature probe was placed in the right nare she had epistaxis which continued post op. A nasal rocket was placed in the right nare. ENT was consulted. Nasal rocket was replaced. She was transfused for anemia. She was transferred to  stepdown on POD 1. She was weaned off of O2. She was diuresed with lasix. Nasal rocket was removed with no further epistaxis. The morning of 9/9/19, patient was feeling well and ambulating without any symptoms. Repeat chest xray showed small bilateral pleural effusions. She was diuresing well with lasix and stable on room air. She will be discharged on continued oral lasix for 7 days. Patient was determined stable and ready for discharge. Patient was instructed on the importance of medication compliance and outpatient follow up. For maximized medical therapy for CAD, patient will continue ASA, Plavix, BB, ACE-I, and statin as well. The patient will have close transitional care follow up with [facility] Dr. Tonya Vigil on September 16th at 12:00pm [facility]). She will then resume care with Dr. Erma Grider.     The patient will follow up with Dr. Liliane Thakkar on September 25th at 2:10pm and has been referred to cardiac rehab. DISPOSITION: The patient is being discharged home with home health services in stable condition on a low saturated fat, low cholesterol and low salt diet. The patient is instructed to advance activities as tolerated to the limit of fatigue or shortness of breath. The patient is instructed to avoid all heavy lifting or activities that strain the chest or upper arm muscles. Strenuous activity should be avoided. The patient is instructed to watch for signs of infection which include: increasing area of redness, fever or purulent drainage at the incision site. The patient is instructed to call the office or return to the ER for immediate evaluation for any shortness of breath or chest pain not relieved by NTG.     Discharge Exam:   Visit Vitals  /65   Pulse 89   Temp 97.8 °F (36.6 °C)   Resp 18   Ht 4' 11\" (1.499 m)   Wt 156 lb 3.2 oz (70.9 kg)   SpO2 96%   BMI 31.55 kg/m²         Physical Exam:  General: Well Developed, Well Nourished, No Acute Distress, Alert & Oriented  Neck: supple, no JVD  Heart: S1S2 with RRR without murmurs or gallops  Lungs: Clear throughout auscultation bilaterally without adventitious sounds  Abd: soft, nontender, nondistended, with good bowel sounds  Ext: warm, no edema  Sternal incision: clean, dry, and intact  Skin: warm and dry      Recent Results (from the past 24 hour(s))   CBC W/O DIFF    Collection Time: 09/09/19  5:38 AM   Result Value Ref Range    WBC 10.2 4.3 - 11.1 K/uL    RBC 3.40 (L) 4.05 - 5.2 M/uL    HGB 10.2 (L) 11.7 - 15.4 g/dL    HCT 32.6 (L) 35.8 - 46.3 %    MCV 95.9 79.6 - 97.8 FL    MCH 30.0 26.1 - 32.9 PG    MCHC 31.3 (L) 31.4 - 35.0 g/dL    RDW 14.3 11.9 - 14.6 %    PLATELET 380 588 - 279 K/uL    MPV 9.2 (L) 9.4 - 12.3 FL    ABSOLUTE NRBC 0.00 0.0 - 0.2 K/uL   POTASSIUM    Collection Time: 09/09/19  5:38 AM   Result Value Ref Range    Potassium 4.1 3.5 - 5.1 mmol/L         Patient Instructions:   Current Discharge Medication List START taking these medications    Details   acetaminophen (TYLENOL) 325 mg tablet Take 2 Tabs by mouth every four (4) hours as needed for Pain.      potassium chloride (K-DUR, KLOR-CON) 20 mEq tablet Take 1 Tab by mouth daily for 7 days. Qty: 7 Tab, Refills: 0      furosemide (LASIX) 40 mg tablet Take 1 Tab by mouth daily for 7 days. Qty: 7 Tab, Refills: 0         CONTINUE these medications which have NOT CHANGED    Details   citalopram (CELEXA) 20 mg tablet Take 20 mg by mouth nightly. fluticasone propionate (FLONASE ALLERGY RELIEF) 50 mcg/actuation nasal spray 2 Sprays by Both Nostrils route as needed for Rhinitis. lisinopril (PRINIVIL, ZESTRIL) 10 mg tablet Take 1 Tab by mouth daily. Qty: 30 Tab, Refills: 5      pravastatin (PRAVACHOL) 40 mg tablet TAKE ONE TABLET BY MOUTH EVERY EVENING  Qty: 90 Tab, Refills: 4      metoprolol tartrate (LOPRESSOR) 25 mg tablet TAKE ONE TABLET BY MOUTH TWICE DAILY  Qty: 180 Tab, Refills: 3      cyanocobalamin 1,000 mcg tablet Take 1,000 mcg by mouth daily. aspirin delayed-release 81 mg tablet Take  by mouth daily. clopidogrel (PLAVIX) 75 mg tab Take 75 mg by mouth daily. Last dose pt reports 8/26/19-- for scheduled Aortic valve replacement      nitroglycerin (NITROSTAT) 0.4 mg SL tablet 1 Tab by SubLINGual route every five (5) minutes as needed for Chest Pain. Indications: Angina  Qty: 25 Tab, Refills: 11      ferrous sulfate 325 mg (65 mg iron) tablet Take  by mouth Daily (before breakfast). therapeutic multivitamin-minerals (THERAGRAN-M) tablet Take 1 Tab by mouth daily.          STOP taking these medications       mupirocin calcium (BACTROBAN) 2 % nasal ointment Comments:   Reason for Stopping:         amiodarone (CORDARONE) 200 mg tablet Comments:   Reason for Stopping:         ibuprofen (ADVIL PO) Comments:   Reason for Stopping:                 Signed:  Satish Cabrera PA-C  9/9/2019  9:32 AM

## 2019-09-09 NOTE — PROGRESS NOTES
Suture s/p CT d/c cleansed and removed. Discharge instructions reviewed with pt and signed, opportunity provided for questions. Rx given to pt: furosemide, potassium chloride. Pt assessment unchanged. Pt from unit via wheelchair in NAD and accompanied by spouse, denies further needs at this time.

## 2019-09-09 NOTE — PROGRESS NOTES
Lonnie Damico  Admission Date: 9/4/2019             Daily Progress Note: 9/9/2019    The patient's chart is reviewed and the patient is discussed with the staff.       Patient had AVR.  Currently is sedated in CV-ICU and orally intubated receiving  mechanical ventilation.  Was seen for exertional chest discomfort with worsening fatigue and murmur noted.  ECHO with severe AS and LHC performed showing mild CAD.  Intraoperatively had epistaxis from right nare related to temperature probe.     Has chronic lymphoma and followed by oncology, hx CVA 1985, Hx CAD with stent LAD 2009, quit smoking 1998, anxiety, depression, HTN, HLD  Subjective:     Off 02  - complains of feeling heaviness on her chest when laying down   cough with clear- yellowish sputum     Current Facility-Administered Medications   Medication Dose Route Frequency    fluticasone propionate (FLONASE) 50 mcg/actuation nasal spray 2 Spray  2 Spray Both Nostrils DAILY    albuterol (PROVENTIL VENTOLIN) nebulizer solution 2.5 mg  2.5 mg Nebulization QID RT    sodium chloride (OCEAN) 0.65 % nasal squeeze bottle 2 Spray  2 Spray Both Nostrils Q2H PRN    senna-docusate (PERICOLACE) 8.6-50 mg per tablet 2 Tab  2 Tab Oral DAILY PRN    lip protectant (BLISTEX) ointment 1 Each  1 Each Topical PRN    pravastatin (PRAVACHOL) tablet 40 mg  40 mg Oral QHS    magnesium hydroxide (MILK OF MAGNESIA) 400 mg/5 mL oral suspension 30 mL  30 mL Oral DAILY PRN    alum-mag hydroxide-simeth (MYLANTA) oral suspension 30 mL  30 mL Oral Q4H PRN    famotidine (PEPCID) tablet 20 mg  20 mg Oral BID    acetaminophen (TYLENOL) tablet 650 mg  650 mg Oral Q4H PRN    traMADol (ULTRAM) tablet 50 mg  50 mg Oral Q6H PRN    magnesium oxide (MAG-OX) tablet 400 mg  400 mg Oral TID PRN    magnesium oxide (MAG-OX) tablet 400 mg  400 mg Oral QID PRN    potassium chloride (K-DUR, KLOR-CON) SR tablet 20 mEq  20 mEq Oral BID PRN    potassium chloride (K-DUR, KLOR-CON) SR tablet 40 mEq  40 mEq Oral BID PRN    influenza vaccine 2019-20 (6 mos+)(PF) (FLUARIX/FLULAVAL/FLUZONE QUAD) injection 0.5 mL  0.5 mL IntraMUSCular PRIOR TO DISCHARGE    oxyCODONE-acetaminophen (PERCOCET) 5-325 mg per tablet 1 Tab  1 Tab Oral Q4H PRN    sodium chloride (NS) flush 5-40 mL  5-40 mL IntraVENous Q8H    sodium chloride (NS) flush 5-40 mL  5-40 mL IntraVENous PRN    amiodarone (CORDARONE) tablet 200 mg  200 mg Oral Q12H    metoprolol tartrate (LOPRESSOR) tablet 25 mg  25 mg Oral Q12H    aspirin chewable tablet 81 mg  81 mg Oral DAILY    ondansetron (ZOFRAN) injection 4 mg  4 mg IntraVENous Q6H PRN       Review of Systems    Constitutional: negative for fever, chills, sweats  Cardiovascular: negative for chest pain, palpitations, syncope, edema  Gastrointestinal:  negative for dysphagia, reflux, vomiting, diarrhea, abdominal pain, or melena  Neurologic:  negative for focal weakness, numbness, headache    Objective:     Vitals:    09/08/19 1900 09/08/19 1951 09/09/19 0230 09/09/19 0535   BP: 136/66  142/66    Pulse: 87  83    Resp: 18  18    Temp: 98.6 °F (37 °C)  97.4 °F (36.3 °C)    SpO2: 96% 98% 95% 98%   Weight:   156 lb 3.2 oz (70.9 kg)    Height:             Intake/Output Summary (Last 24 hours) at 9/9/2019 0548  Last data filed at 9/9/2019 0525  Gross per 24 hour   Intake 1270 ml   Output 4150 ml   Net -2880 ml       Physical Exam:   Constitution:  the patient is well developed and in no acute distress  EENMT:  Sclera clear, pupils equal, oral mucosa moist  Respiratory:  Minimal wheezes at rest  Cardiovascular:  RRR without M,G,R  Gastrointestinal: soft and non-tender; with positive bowel sounds. Musculoskeletal: warm without cyanosis. There is no lower extremity edema.   Skin:  no jaundice or rashes, sternal wounds   Neurologic: no gross neuro deficits     Psychiatric:  alert and oriented x 3    CXR:   None today    LAB  No results for input(s): GLUCPOC in the last 72 hours.    No lab exists for component: [place]   Recent Labs     09/08/19  0505 09/07/19  0501   WBC 8.2 11.1   HGB 9.1* 9.4*   HCT 29.4* 30.1*    189     Recent Labs     09/08/19  0505 09/07/19  0501   K 3.9 3.8   MG 2.0 2.3         Assessment:  (Medical Decision Making)     Hospital Problems  Date Reviewed: 8/28/2019          Codes Class Noted POA    Wheezing ICD-10-CM: R06.2  ICD-9-CM: 786.07  9/8/2019 Unknown    Mainly with activity      Atelectasis, left ICD-10-CM: J98.11  ICD-9-CM: 518.0  9/5/2019 Yes        Acute blood loss anemia ICD-10-CM: D62  ICD-9-CM: 285.1  9/5/2019 Yes        Aortic stenosis ICD-10-CM: I35.0  ICD-9-CM: 424.1  9/4/2019 Yes        * (Principal) S/P AVR (aortic valve replacement) ICD-10-CM: Z95.2  ICD-9-CM: V43.3  9/4/2019 No    Overview Signed 9/4/2019 12:26 PM by Fatemeh Purdy NP     9/4/19  Dr. Maco Pantoja             Hypoxia ICD-10-CM: R09.02  ICD-9-CM: 799.02  9/4/2019 No    Off o2    Epistaxis ICD-10-CM: R04.0  ICD-9-CM: 784.7  9/4/2019 No        Essential hypertension, benign (Chronic) ICD-10-CM: I10  ICD-9-CM: 401.1  3/12/2009 Yes              Plan:  (Medical Decision Making)   - continue nebs, IS   - CXR later today    More than 50% of the time documented was spent in face-to-face contact with the patient and in the care of the patient on the floor/unit where the patient is located.     Lesli Elise MD

## 2019-09-09 NOTE — PROGRESS NOTES
Bedside shift change report given to myself (oncoming nurse) by ARABELLA Canseco (offgoing nurse). Report included the following information SBAR, Kardex, Intake/Output, MAR, Recent Results and Cardiac Rhythm NSR. Will continue to monitor.

## 2019-09-09 NOTE — PROGRESS NOTES
Bedside shift change report given to Sandhya Davidson RN (oncoming nurse) by myself (offgoing nurse). Report included the following information SBAR, Kardex, Procedure Summary, Intake/Output, MAR, Recent Results and Cardiac Rhythm NSR.

## 2019-09-09 NOTE — DISCHARGE INSTRUCTIONS
All patients with prosthetic valves are considered among those at highest risk for endocarditis (infection of a heart valve). It is important to maintain good oral health care with regular use of a manual or powered toothbrush, dental floss or other plaque-removing devices. The risk of an infection on the heart valve is generally the highest with dental procedures which includes routine dental cleaning. You will need to take antibiotics before most dental procedures or oral surgery. You will also need antibiotics before procedures such as a having tonsils removed or procedures involving the lungs. If you require surgery for a skin infection, you will also need antibiotics to prevent infection of a heart valve. Please contact your cardiologist or primary care provider for further instructions. Patient Education      Aortic Valve Replacement Surgery: What to Expect at [address] [place] [address] have had surgery to replace your heart's aortic valve. Your doctor did the surgery through a cut, called an incision, in your chest.  You will feel tired and sore for the first few weeks after surgery. You may have some brief, sharp pains on either side of your chest. Your chest, shoulders, and upper back may ache. The incision in your chest may be sore or swollen. These symptoms usually get better after 4 to 6 weeks. You will probably be able to do many of your usual activities after 4 to 6 weeks. But for at least 6 weeks, you will not be able to lift heavy objects or do activities that strain your chest or upper arm muscles. At first you may notice that you get tired easily and need to rest often. It may take 1 to 2 months to get your energy back. Some people find that they are more emotional after this surgery. You may cry easily or show emotion in ways that are unusual for you. This is common and may last for up to a year. Some people get depressed after this surgery.  Talk with your doctor if you have sadness that continues or you are concerned about how you are feeling. Treatment and other support can help you feel better. Even though you have a new aortic valve, it is still important to eat a heart-healthy diet, get regular exercise, not smoke, take your heart medicines, and reduce stress. Your doctor may recommend that you work with a nurse, a dietitian, and a physical therapist to make these changes. This is sometimes called cardiac rehabilitation. This care sheet gives you a general idea about how long it will take for you to recover. But each person recovers at a different pace. Follow the steps below to get better as quickly as possible. How can you care for yourself at home? Activity    · Rest when you feel tired. Getting enough sleep will help you recover. Try to sleep on your back while your breastbone (sternum) heals. This usually takes about 4 to 6 weeks.     · Try to walk each day. Start by walking a little more than you did the day before. Bit by bit, increase the amount you walk. Walking boosts blood flow and helps prevent pneumonia and constipation.     · Avoid strenuous activities, such as bicycle riding, jogging, weight lifting, or heavy aerobic exercise, until your doctor says it is okay.     · For 3 months, avoid activities that strain your chest or upper arm muscles. This includes pushing a  or vacuum, mopping floors, or swinging a golf club or tennis racquet.     · For at least 6 weeks, avoid lifting anything that would make you strain. This may include a child, heavy grocery bags and milk containers, a heavy briefcase or backpack, or cat litter or dog food bags.     · Hold a pillow firmly over your chest incision when you cough or take deep breaths. This will support your chest and reduce your pain.     · Do breathing exercises at home as instructed by your doctor.  This will help prevent pneumonia.     · Ask your doctor when you can drive again.     · You will probably need to take 4 to 12 weeks off from work. It depends on the type of work you do and how you feel.     · You may shower as usual. Pat the incision dry. Do not take a bath for the first 3 weeks, or until your doctor tells you it is okay.     · Do not swim or use a hot tub for at least 1 month, or until your doctor says it is okay.     · Ask your doctor when it is okay for you to have sex. Diet    · Eat a heart-healthy, low-salt diet. If you have not been eating this way, talk to your doctor. You also may want to talk to a dietitian. A dietitian can help you plan meals and learn about healthy foods.     · Drink plenty of fluids (unless your doctor tells you not to).     · You may notice that your bowel movements are not regular right after your surgery. This is common. Try to avoid constipation and straining with bowel movements. You may want to take a fiber supplement every day. If you have not had a bowel movement after a couple of days, ask your doctor about taking a mild laxative. Medicines    · Your doctor will tell you if and when you can restart your medicines. He or she will also give you instructions about taking any new medicines.     · If you take blood thinners, such as warfarin (Coumadin), clopidogrel (Plavix), or aspirin, be sure to talk to your doctor. He or she will tell you if and when to start taking those medicines again. Make sure that you understand exactly what your doctor wants you to do.     · Be safe with medicines. Take your medicines exactly as prescribed. Call your doctor if you think you are having a problem with your medicine.     · Take pain medicines exactly as directed. ? If the doctor gave you a prescription medicine for pain, take it as prescribed. ? If you are not taking a prescription pain medicine, ask your doctor if you can take an over-the-counter medicine.   ? Do not take aspirin, ibuprofen (Advil, Motrin), naproxen (Aleve), or other nonsteroidal anti-inflammatory drugs (NSAIDs) unless your doctor says it is okay.     · If you think your pain medicine is making you sick to your stomach:  ? Take your medicine after meals (unless your doctor has told you not to). ? Ask your doctor for a different pain medicine.     · If your doctor prescribed antibiotics, take them as directed. Do not stop taking them just because you feel better. You need to take the full course of antibiotics.     · Your doctor may give you a blood thinner to prevent blood clots. If you take a blood thinner, be sure you get instructions about how to take your medicine safely. Blood thinners can cause serious bleeding problems. Incision care    · If you have strips of tape on the incision the doctor made, leave the tape on for a week or until it falls off.     · Wash the area daily with warm, soapy water and pat it dry. Don't use hydrogen peroxide or alcohol, which may delay healing. You may cover the area with a gauze bandage if it weeps or rubs against clothing. Change the bandage every day.     · Keep the area clean and dry. Other instructions    · Keep track of your weight. Weigh yourself every day at the same time of day, on the same scale, in the same amount of clothing. A sudden increase in weight can be a sign of a problem with your heart. Tell your doctor if you suddenly gain weight, such as 3 pounds or more in 2 to 3 days.     · Be sure to tell all your doctors and your dentist that you have an artificial aortic valve. This is important, because you may need to take antibiotics before certain procedures to prevent infection. Follow-up care is a key part of your treatment and safety. Be sure to make and go to all appointments, and call your doctor if you are having problems. It's also a good idea to know your test results and keep a list of the medicines you take. When should you call for help? Call 911 anytime you think you may need emergency care.  For example, call if:    · You passed out (lost consciousness).     · You have trouble breathing.     · You have severe pain in your chest.     · You have symptoms of a stroke. These may include:  ? Sudden numbness, tingling, weakness, or loss of movement in your face, arm, or leg, especially on only one side of your body. ? Sudden vision changes. ? Sudden trouble speaking. ? Sudden confusion or trouble understanding simple statements. ? Sudden problems with walking or balance. ? A sudden, severe headache that is different from past headaches.     · You have symptoms of a heart attack. These may include:  ? Chest pain or pressure, or a strange feeling in the chest.  ? Sweating. ? Shortness of breath. ? Nausea or vomiting. ? Pain, pressure, or a strange feeling in the back, neck, jaw, or upper belly or in one or both shoulders or arms. ? Lightheadedness or sudden weakness. ? A fast or irregular heartbeat.    After you call 911, the  may tell you to chew 1 adult-strength or 2 to 4 low-dose aspirin. Wait for an ambulance. Do not try to drive yourself.   Call your doctor now or seek immediate medical care if:    · You have pain that does not get better after you take pain medicine.     · You have loose stitches, or your incision comes open.     · Bright red blood has soaked through the bandage over your incision.     · You have signs of infection, such as:  ? Increased pain, swelling, warmth, or redness. ? Red streaks leading from the incision. ? Pus draining from the incision. ? A fever.     · You have signs of a blood clot, such as:  ? Pain in your calf, back of the knee, thigh, or groin. ? Redness and swelling in your leg or groin.     · You have new or changed symptoms of heart failure, such as:  ? New or increased shortness of breath. ? New or worse swelling in your legs, ankles, or feet. ? Sudden weight gain, such as more than 2 to 3 pounds in a day or 5 pounds in a week.  (Your doctor may suggest a different range of weight gain.)  ? Feeling dizzy or lightheaded or like you may faint. ? Feeling so tired or weak that you cannot do your usual activities. ? Not sleeping well. Shortness of breath wakes you at night. You need extra pillows to prop yourself up to breathe easier.    Watch closely for changes in your health, and be sure to contact your doctor if you have any problems. Where can you learn more? Go to http://jigar-christopher.info/. Enter O625 in the search box to learn more about \"Aortic Valve Replacement Surgery: What to Expect at Home. \"  Current as of: July 22, 2018  Content Version: 12.1  © 9298-6397 [employer]. Care instructions adapted under license by [employer] (which disclaims liability or warranty for this information). If you have questions about a medical condition or this instruction, always ask your healthcare professional. [place] [address] any warranty or liability for your use of this information. DISCHARGE SUMMARY from Nurse    PATIENT INSTRUCTIONS:    After general anesthesia or intravenous sedation, for 24 hours or while taking prescription Narcotics:  · Limit your activities  · Do not drive and operate hazardous machinery  · Do not make important personal or business decisions  · Do  not drink alcoholic beverages  · If you have not urinated within 8 hours after discharge, please contact your surgeon on call. Report the following to your surgeon:  · Excessive pain, swelling, redness or odor of or around the surgical area  · Temperature over 100.5  · Nausea and vomiting lasting longer than 4 hours or if unable to take medications  · Any signs of decreased circulation or nerve impairment to extremity: change in color, persistent  numbness, tingling, coldness or increase pain  · Any questions    What to do at Home:    *  Please give a list of your current medications to your Primary Care Provider.     *  Please update this list whenever your medications are discontinued, doses are      changed, or new medications (including over-the-counter products) are added. *  Please carry medication information at all times in case of emergency situations. These are general instructions for a healthy lifestyle:    No smoking/ No tobacco products/ Avoid exposure to second hand smoke  Surgeon General's Warning:  Quitting smoking now greatly reduces serious risk to your health. Obesity, smoking, and sedentary lifestyle greatly increases your risk for illness    A healthy diet, regular physical exercise & weight monitoring are important for maintaining a healthy lifestyle    You may be retaining fluid if you have a history of heart failure or if you experience any of the following symptoms:  Weight gain of 3 pounds or more overnight or 5 pounds in a week, increased swelling in our hands or feet or shortness of breath while lying flat in bed. Please call your doctor as soon as you notice any of these symptoms; do not wait until your next office visit. The discharge information has been reviewed with the patient. The patient verbalized understanding. Discharge medications reviewed with the patient and appropriate educational materials and side effects teaching were provided.   ___________________________________________________________________________________________________________________________________

## 2019-10-17 PROBLEM — R07.9 CHEST PAIN: Status: RESOLVED | Noted: 2019-08-19 | Resolved: 2019-10-17

## 2019-10-17 PROBLEM — I35.0 AORTIC STENOSIS: Status: RESOLVED | Noted: 2019-09-04 | Resolved: 2019-10-17

## 2019-10-17 PROBLEM — I35.0 NONRHEUMATIC AORTIC VALVE STENOSIS: Status: RESOLVED | Noted: 2019-08-21 | Resolved: 2019-10-17

## 2019-10-17 PROBLEM — E66.9 OBESITY: Status: ACTIVE | Noted: 2019-10-17

## 2019-10-17 PROBLEM — R53.83 FATIGUE: Status: RESOLVED | Noted: 2019-08-19 | Resolved: 2019-10-17

## 2019-10-17 PROBLEM — Z79.899 ENCOUNTER FOR LONG-TERM CURRENT USE OF MEDICATION: Status: ACTIVE | Noted: 2019-10-17

## 2020-07-13 PROBLEM — R09.02 HYPOXIA: Status: RESOLVED | Noted: 2019-09-04 | Resolved: 2020-07-13

## 2020-07-13 PROBLEM — R04.0 EPISTAXIS: Status: RESOLVED | Noted: 2019-09-04 | Resolved: 2020-07-13

## 2020-07-13 PROBLEM — D62 ACUTE BLOOD LOSS ANEMIA: Status: RESOLVED | Noted: 2019-09-05 | Resolved: 2020-07-13

## 2020-07-13 PROBLEM — R06.2 WHEEZING: Status: RESOLVED | Noted: 2019-09-08 | Resolved: 2020-07-13

## 2020-11-30 ENCOUNTER — HOSPITAL ENCOUNTER (OUTPATIENT)
Dept: LAB | Age: 70
Discharge: HOME | End: 2020-11-30

## 2020-11-30 PROCEDURE — 88305 TISSUE EXAM BY PATHOLOGIST: CPT

## 2022-03-18 PROBLEM — Z95.2 S/P AVR (AORTIC VALVE REPLACEMENT): Status: ACTIVE | Noted: 2019-09-04

## 2022-03-18 PROBLEM — K63.5 COLON POLYP: Status: ACTIVE | Noted: 2017-08-16

## 2022-03-19 PROBLEM — Z78.0 POSTMENOPAUSAL: Status: ACTIVE | Noted: 2017-08-16

## 2022-03-19 PROBLEM — E66.9 OBESITY: Status: ACTIVE | Noted: 2019-10-17

## 2022-03-19 PROBLEM — K57.30 DIVERTICULOSIS OF LARGE INTESTINE: Status: ACTIVE | Noted: 2017-08-16

## 2022-03-19 PROBLEM — J98.11 ATELECTASIS, LEFT: Status: ACTIVE | Noted: 2019-09-05

## 2022-03-19 PROBLEM — Z79.899 ENCOUNTER FOR LONG-TERM CURRENT USE OF MEDICATION: Status: ACTIVE | Noted: 2019-10-17

## 2022-03-20 PROBLEM — D64.9 ANEMIA: Status: ACTIVE | Noted: 2018-08-21

## 2022-06-30 RX ORDER — FLUTICASONE PROPIONATE 50 MCG
2 SPRAY, SUSPENSION (ML) NASAL PRN
Qty: 1 EACH | Refills: 5 | Status: SHIPPED | OUTPATIENT
Start: 2022-06-30

## 2022-07-11 ENCOUNTER — TELEPHONE (OUTPATIENT)
Dept: PHARMACY | Facility: CLINIC | Age: 72
End: 2022-07-11

## 2022-07-11 NOTE — TELEPHONE ENCOUNTER
[facility] CLINICAL PHARMACY: ADHERENCE REVIEW  Identified care gap per [employer] fills at [address]: ACE/ARB and Statin adherence    Last Visit: 4/25/22    ASSESSMENT  ACE/ARB ADHERENCE    Insurance Records claims through 6/19/22 (Prior Year Andrade Laguerre = 100%; YTD [place] = 100%; Potential Fail Date: 10/17/22): Lisinopril 10 mg tab last filled on 4/25/22 for 90 day supply. Next refill due: 8/15/22    Per 2021 adherence report: [place] 100%; last claim 11/23/21 x 90 ds, next refill due 2/21/22; max refill due 3/11/22    Per Reconciled Dispense Report:  LISINOPRIL  10 MG TABS 04/25/2022 90 90 tablet [facility]... LISINOPRIL  10 MG TABS 02/16/2022 90 90 tablet [facility]... Last Rx written 4/25/22 x 90 ds 3 refills    BP Readings from Last 3 Encounters:   04/25/22 (!) 128/54   10/25/21 (!) 126/50   04/19/21 (!) 128/47     CrCl cannot be calculated (Patient's most recent lab result is older than the maximum 180 days allowed. ). [address] Records claims through 6/19/22 (Prior Year Andrade Laguerre = 100%; YTD Andrade Laguerre = Filled only once; Potential Fail Date: 7/24/22):   Pravastatin 40 mg tab last filled on 2/22/22 for 90 day supply. Next refill due: 5/23/22    Per 2021 adherence report: [place] 100%; last claim 11/27/21 x 90 ds, next refill due 2/25/22; max refill due 3/11/22  - adjusted refill due date 6/9/22    Per Practice Assist 7/11/22 last claim 7/1/22 x 90 ds, PDC 71%    Per Reconciled Dispense Report:  PRAVASTATIN SODIUM  40 MG TABS 07/01/2022 90 90 tablet [facility]... PRAVASTATIN SODIUM  40 MG TABS 02/22/2022 90 90 tablet [facility]...    Last Rx written 4/25/22 x 90 ds 3 refills    Lab Results   Component Value Date    CHOL 111 04/25/2022    TRIG 71 04/25/2022    HDL 38 (L) 04/25/2022    LDLCALC 58 04/25/2022     ALT   Date Value Ref Range Status   09/03/2019 10 (L) 12 - 65 U/L Final AST   Date Value Ref Range Status   09/03/2019 9 (L) 15 - 37 U/L Final     The ASCVD Risk score (Emelyn Weber., et al., 2013) failed to calculate for the following reasons: The valid total cholesterol range is 130 to 320 mg/dL     PLAN  The following are interventions that have been identified:   - Patient overdue refilling pravastatin per report, has since been refilled. [place] under 80% - appears there may have been some 2021 surplus and [place] 100% in 2021  - of note, may have surplus of lisinopril at home (adjusted refill due date ~9/7/22?)    No patient out reach planned at this time.     Future Appointments   Date Time Provider Luís Minaya   10/28/2022  9:15 AM Coy Ba MD [facility]       [place] Jiménez, PharmD, [facility]  Department, toll free: [phone] option 2    For Pharmacy Admin Tracking Only     Gap Closed?: Yes    Time Spent (min): 10

## 2022-07-28 ENCOUNTER — OFFICE VISIT (OUTPATIENT)
Dept: INTERNAL MEDICINE CLINIC | Facility: CLINIC | Age: 72
End: 2022-07-28
Payer: MEDICARE

## 2022-07-28 ENCOUNTER — TELEPHONE (OUTPATIENT)
Dept: INTERNAL MEDICINE CLINIC | Facility: CLINIC | Age: 72
End: 2022-07-28

## 2022-07-28 VITALS
HEIGHT: 56 IN | HEART RATE: 77 BPM | RESPIRATION RATE: 18 BRPM | OXYGEN SATURATION: 98 % | SYSTOLIC BLOOD PRESSURE: 134 MMHG | DIASTOLIC BLOOD PRESSURE: 54 MMHG | WEIGHT: 148.4 LBS | TEMPERATURE: 98.2 F | BODY MASS INDEX: 33.38 KG/M2

## 2022-07-28 DIAGNOSIS — M25.571 ACUTE RIGHT ANKLE PAIN: Primary | ICD-10-CM

## 2022-07-28 DIAGNOSIS — R22.41 LOCALIZED SWELLING OF RIGHT LOWER LEG: ICD-10-CM

## 2022-07-28 LAB — D DIMER PPP FEU-MCNC: 0.28 UG/ML(FEU)

## 2022-07-28 PROCEDURE — G8417 CALC BMI ABV UP PARAM F/U: HCPCS | Performed by: NURSE PRACTITIONER

## 2022-07-28 PROCEDURE — G8427 DOCREV CUR MEDS BY ELIG CLIN: HCPCS | Performed by: NURSE PRACTITIONER

## 2022-07-28 PROCEDURE — 1036F TOBACCO NON-USER: CPT | Performed by: NURSE PRACTITIONER

## 2022-07-28 PROCEDURE — 1090F PRES/ABSN URINE INCON ASSESS: CPT | Performed by: NURSE PRACTITIONER

## 2022-07-28 PROCEDURE — 99213 OFFICE O/P EST LOW 20 MIN: CPT | Performed by: NURSE PRACTITIONER

## 2022-07-28 PROCEDURE — G8399 PT W/DXA RESULTS DOCUMENT: HCPCS | Performed by: NURSE PRACTITIONER

## 2022-07-28 PROCEDURE — 3017F COLORECTAL CA SCREEN DOC REV: CPT | Performed by: NURSE PRACTITIONER

## 2022-07-28 PROCEDURE — 1123F ACP DISCUSS/DSCN MKR DOCD: CPT | Performed by: NURSE PRACTITIONER

## 2022-07-28 RX ORDER — FUROSEMIDE 20 MG/1
20 TABLET ORAL 2 TIMES DAILY
COMMUNITY
End: 2022-10-28 | Stop reason: CLARIF

## 2022-07-28 RX ORDER — TRAMADOL HYDROCHLORIDE 50 MG/1
50 TABLET ORAL EVERY 8 HOURS PRN
Qty: 21 TABLET | Refills: 0 | Status: SHIPPED | OUTPATIENT
Start: 2022-07-28 | End: 2022-08-04

## 2022-07-28 SDOH — ECONOMIC STABILITY: FOOD INSECURITY: WITHIN THE PAST 12 MONTHS, YOU WORRIED THAT YOUR FOOD WOULD RUN OUT BEFORE YOU GOT MONEY TO BUY MORE.: NEVER TRUE

## 2022-07-28 SDOH — ECONOMIC STABILITY: FOOD INSECURITY: WITHIN THE PAST 12 MONTHS, THE FOOD YOU BOUGHT JUST DIDN'T LAST AND YOU DIDN'T HAVE MONEY TO GET MORE.: NEVER TRUE

## 2022-07-28 ASSESSMENT — SOCIAL DETERMINANTS OF HEALTH (SDOH): HOW HARD IS IT FOR YOU TO PAY FOR THE VERY BASICS LIKE FOOD, HOUSING, MEDICAL CARE, AND HEATING?: NOT HARD AT ALL

## 2022-07-28 ASSESSMENT — PATIENT HEALTH QUESTIONNAIRE - PHQ9
8. MOVING OR SPEAKING SO SLOWLY THAT OTHER PEOPLE COULD HAVE NOTICED. OR THE OPPOSITE, BEING SO FIGETY OR RESTLESS THAT YOU HAVE BEEN MOVING AROUND A LOT MORE THAN USUAL: 0
SUM OF ALL RESPONSES TO PHQ QUESTIONS 1-9: 9
6. FEELING BAD ABOUT YOURSELF - OR THAT YOU ARE A FAILURE OR HAVE LET YOURSELF OR YOUR FAMILY DOWN: 0
2. FEELING DOWN, DEPRESSED OR HOPELESS: 3
5. POOR APPETITE OR OVEREATING: 0
SUM OF ALL RESPONSES TO PHQ QUESTIONS 1-9: 9
10. IF YOU CHECKED OFF ANY PROBLEMS, HOW DIFFICULT HAVE THESE PROBLEMS MADE IT FOR YOU TO DO YOUR WORK, TAKE CARE OF THINGS AT HOME, OR GET ALONG WITH OTHER PEOPLE: 0
3. TROUBLE FALLING OR STAYING ASLEEP: 3
9. THOUGHTS THAT YOU WOULD BE BETTER OFF DEAD, OR OF HURTING YOURSELF: 0
4. FEELING TIRED OR HAVING LITTLE ENERGY: 0
1. LITTLE INTEREST OR PLEASURE IN DOING THINGS: 3
SUM OF ALL RESPONSES TO PHQ QUESTIONS 1-9: 9
SUM OF ALL RESPONSES TO PHQ QUESTIONS 1-9: 9
7. TROUBLE CONCENTRATING ON THINGS, SUCH AS READING THE NEWSPAPER OR WATCHING TELEVISION: 0
SUM OF ALL RESPONSES TO PHQ9 QUESTIONS 1 & 2: 6

## 2022-07-28 ASSESSMENT — ENCOUNTER SYMPTOMS
COLOR CHANGE: 0
NAUSEA: 0
SHORTNESS OF BREATH: 0
VOMITING: 0

## 2022-07-28 NOTE — TELEPHONE ENCOUNTER
We only have lisinopril as her medication in our chart. Looks like Dr Rafi Keenan changed it back in 2018 and we have had her on plain Lisinopril ever since.    Elana Sullivan MD

## 2022-07-28 NOTE — PROGRESS NOTES
7/28/2022 10:35 AM  Location:[facility] [address] [place] [facility]  [place]  Patient #:  [id number]  YOB: 1950          YOUR LAST HEMOGLOBIN A1CS:   No results found for: HBA1C, [id number]    YOUR LAST LIPID PROFILE:   Lab Results   Component Value Date/Time    CHOL 111 04/25/2022 10:44 AM    HDL 38 04/25/2022 10:44 AM    VLDL 15 04/25/2022 10:44 AM         Lab Results   Component Value Date/Time    GFRAA 109 10/19/2020 09:27 AM    BUN 8 10/19/2020 09:27 AM    NAPOC 146 09/04/2019 05:55 PM     10/19/2020 09:27 AM    K 4.7 10/19/2020 09:27 AM     10/19/2020 09:27 AM    CO2 26 10/19/2020 09:27 AM           History of Present Illness     Chief Complaint   Patient presents with    Joint Swelling     Swollen right ankle for 3 weeks and it hurts       Ms. Serge Goldman is a 70 y.o. female  who presents for right lower leg pain and swelling. Ms. Serge Goldman presents for right medial ankle swelling and pain. This has been ongoing for the past 3 weeks and she denies any traumatic or inciting event. She reports intermittent swelling to that right leg since this pain began. Her history is significant for hypertension, CAD, asthma, CVA on Plavix and aspirin as well as lymphoma on active chemotherapy. She told her oncologist about her right leg pain last week and was placed on Lasix. Notes that this has not been helpful to her. She has tried ice and heat which has also not been helpful. She states that movement and weightbearing precipitates increased pain, states that it becomes red and swells with ambulation. She denies fevers or chills, rashes or lesions. She denies shortness of breath, weight gain, chest pain or palpitations. She denies any history of DVT or PE. She denies recent travel or prolonged immobilization. She has a history of varicose veins and had vein stripping to that leg several years ago but no other orthopedic surgeries to that leg.   Denies associated back pain.    Ankle Pain   The incident occurred more than 1 week ago. There was no injury mechanism. The pain is present in the right ankle. The pain is moderate. The pain has been Constant since onset. Associated symptoms include a loss of motion. Pertinent negatives include no inability to bear weight, loss of sensation, muscle weakness, numbness or tingling. She has tried ice and heat for the symptoms. The treatment provided no relief. Allergies   Allergen Reactions    Sulfa Antibiotics Nausea Only     Past Medical History:   Diagnosis Date    Abnormal weight gain     Acute bronchitis     Acute, but ill-defined, cerebrovascular disease 4/20/2015    Adjustment disorder with depressed mood     Allergic rhinitis, cause unspecified 4/21/2015    Anxiety and depression     Celexa    Aortic stenosis     8/20/19 Echo- AV stenosis-  LVEF 55-60%    Chest pressure     pt presented to ER with CP and SOB- work up recommended TAVR.     Disorder of bone and cartilage, unspecified 4/21/2015    Encounter for long-term (current) use of other medications     Former smoker, stopped smoking in distant past     Quit 1998---0.5 ppd x 15 years    GERD (gastroesophageal reflux disease)     valve does not open and close unless laying on Left side and HOB elevated 4\" & 2 pillows, no medications    H/O heart artery stent 2009    X1    Herpes zoster without mention of complication     History of stroke at age 28    on correlation to migraines    Hypercholesteremia     managed well with Pravastatin    Hypertension     Long term current use of anticoagulant     Plavix and Aspirin 81mg    Lymphoma ([facility] [address]     to bones- no treatment required at this time- Followed by Dr Sosa Fortune    Mixed incontinence urge and stress (male)(female) 4/21/2015    Osteoarthritis, generalized     Other and unspecified hyperlipidemia     Overweight(278.02)     BMI 28.8    Pleurisy without mention of effusion or current tuberculosis     PONV (postoperative nausea and vomiting)     some N/V- pt does well with antiemetic. Premature menopause     Rheumatoid arthritis(714.0)     SOB (shortness of breath)     Stroke ([facility] [address] 1985    no residual    Systolic murmur 1454    Unspecified asthma(493.90) 2015    pt has not had any complications in \"years\", no inhalers or nebulizers    Unspecified disorder of skin and subcutaneous tissue     Unspecified menopausal and postmenopausal disorder      Social History     Socioeconomic History    Marital status:      Spouse name: None    Number of children: None    Years of education: None    Highest education level: None   Tobacco Use    Smoking status: Former     Packs/day: 0.50     Types: Cigarettes     Quit date: 1998     Years since quittin.5    Smokeless tobacco: Never   Substance and Sexual Activity    Alcohol use: Yes     Alcohol/week: 1.0 standard drink    Drug use: No     Social Determinants of Health     Financial Resource Strain: Low Risk     Difficulty of Paying Living Expenses: Not hard at all   Food Insecurity: No Food Insecurity    Worried About Running Out of Food in the Last Year: Never true    Ran Out of Food in the Last Year: Never true     Past Surgical History:   Procedure Laterality Date    AORTIC VALVE REPLACEMENT  2019    AVR by Dr. Daymon Kocher #[id number]--MYDH 25mm   model [id number]    CARDIAC CATHETERIZATION  2019    CATARACT REMOVAL Bilateral     iol    CHOLECYSTECTOMY      CORONARY ANGIOPLASTY WITH STENT PLACEMENT      HYSTERECTOMY (CERVIX STATUS UNKNOWN)      ROTATOR CUFF REPAIR Left     SHOULDER ARTHROSCOPY Right     \"chipped bone\"    UROLOGICAL SURGERY  2008    BLADDER TACK    VASCULAR SURGERY Right     VEIN IN R LEG Stripped     Current Outpatient Medications   Medication Sig Dispense Refill    Zanubrutinib 80 MG CAPS Take by mouth 2 times a day      furosemide (LASIX) 20 MG tablet Take 20 mg by mouth in the morning and 20 mg before bedtime.  Every other day. fluticasone (FLONASE) 50 MCG/ACT nasal spray 2 sprays by Nasal route as needed (as needed.) 1 each 5    acetaminophen (TYLENOL) 325 MG tablet Take 650 mg by mouth every 4 hours as needed      aspirin 81 MG EC tablet Take by mouth daily      calcium citrate-vitamin D (CITRACAL+D) 315-200 MG-UNIT per tablet Take 1 tablet by mouth daily (with breakfast)      clopidogrel (PLAVIX) 75 MG tablet Take 75 mg by mouth daily      cyanocobalamin 1000 MCG tablet Take 1,000 mcg by mouth daily      lisinopril (PRINIVIL;ZESTRIL) 10 MG tablet Take 10 mg by mouth daily      metoprolol tartrate (LOPRESSOR) 25 MG tablet TAKE ONE TABLET BY MOUTH TWICE DAILY      pravastatin (PRAVACHOL) 40 MG tablet Take 40 mg by mouth      citalopram (CELEXA) 20 MG tablet Take 20 mg by mouth (Patient not taking: Reported on 7/28/2022)      nitroGLYCERIN (NITROSTAT) 0.4 MG SL tablet Place 0.4 mg under the tongue (Patient not taking: Reported on 7/28/2022)       No current facility-administered medications for this visit.      Health Maintenance   Topic Date Due    DTaP/Tdap/Td vaccine (1 - Tdap) Never done    Shingles vaccine (1 of 2) Never done    COVID-19 Vaccine (4 - Booster for Pfizer series) 01/06/2022    Flu vaccine (1) 09/01/2022    Annual Wellness Visit (AWV)  10/26/2022    Lipids  04/25/2023    Depression Monitoring  04/25/2023    Breast cancer screen  01/10/2024    Colorectal Cancer Screen  11/30/2025    DEXA (modify frequency per FRAX score)  Completed    Pneumococcal 65+ years Vaccine  Completed    Hepatitis C screen  Completed    Hepatitis A vaccine  Aged Out    Hepatitis B vaccine  Aged Out    Hib vaccine  Aged Out    Meningococcal (ACWY) vaccine  Aged Out     Family History   Problem Relation Age of Onset    Diabetes Mother     Glaucoma Mother     Heart Disease Mother     Alcohol Abuse Father     Liver Disease Father     Cancer Sister         pancreatic cancer    Diabetes Sister     Hypertension Sister     Diabetes Maternal Grandmother     Diabetes Paternal Grandmother     Heart Disease Sister         CHF    Diabetes Sister              Review of Systems  Review of Systems   Constitutional:  Negative for fever. Respiratory:  Negative for shortness of breath. Cardiovascular:  Positive for leg swelling. Negative for chest pain and palpitations. Gastrointestinal:  Negative for nausea and vomiting. Musculoskeletal:  Positive for arthralgias, gait problem and joint swelling. Skin:  Negative for color change, pallor and wound. Neurological:  Negative for tingling and numbness. All other systems reviewed and are negative. BP (!) 134/54 (Site: Right Upper Arm, Position: Sitting, Cuff Size: Medium Adult)   Pulse 77   Temp 98.2 °F (36.8 °C) (Temporal)   Resp 18   Ht 4' 8\" (1.422 m)   Wt 148 lb 6.4 oz (67.3 kg)   SpO2 98% Comment: ra  BMI 33.27 kg/m²       Physical Exam    Physical Exam  Vitals and nursing note reviewed. Constitutional:       General: She is not in acute distress. Appearance: She is not ill-appearing, toxic-appearing or diaphoretic. HENT:      Mouth/Throat:      Mouth: Mucous membranes are moist.   Cardiovascular:      Rate and Rhythm: Regular rhythm. Comments: Can hear valve click  Pulmonary:      Effort: No respiratory distress. Breath sounds: No wheezing, rhonchi or rales. Musculoskeletal:      Right lower leg: No deformity, lacerations or tenderness. Edema (localized to right medial ankle area. negative nilay's sign. multiple variscosities) present. Left lower leg: No edema. Right ankle: Swelling present. No deformity or ecchymosis. Tenderness present. No medial malleolus tenderness. Decreased range of motion. Normal pulse. Right Achilles Tendon: Normal. No tenderness. Right foot: Normal.   Neurological:      Mental Status: She is oriented to person, place, and time.          Assessment & Plan         Current Outpatient Medications   Medication Sig Dispense Refill    Zanubrutinib 80 MG CAPS Take by mouth 2 times a day      furosemide (LASIX) 20 MG tablet Take 20 mg by mouth in the morning and 20 mg before bedtime. Every other day. fluticasone (FLONASE) 50 MCG/ACT nasal spray 2 sprays by Nasal route as needed (as needed.) 1 each 5    acetaminophen (TYLENOL) 325 MG tablet Take 650 mg by mouth every 4 hours as needed      aspirin 81 MG EC tablet Take by mouth daily      calcium citrate-vitamin D (CITRACAL+D) 315-200 MG-UNIT per tablet Take 1 tablet by mouth daily (with breakfast)      clopidogrel (PLAVIX) 75 MG tablet Take 75 mg by mouth daily      cyanocobalamin 1000 MCG tablet Take 1,000 mcg by mouth daily      lisinopril (PRINIVIL;ZESTRIL) 10 MG tablet Take 10 mg by mouth daily      metoprolol tartrate (LOPRESSOR) 25 MG tablet TAKE ONE TABLET BY MOUTH TWICE DAILY      pravastatin (PRAVACHOL) 40 MG tablet Take 40 mg by mouth      citalopram (CELEXA) 20 MG tablet Take 20 mg by mouth (Patient not taking: Reported on 7/28/2022)      nitroGLYCERIN (NITROSTAT) 0.4 MG SL tablet Place 0.4 mg under the tongue (Patient not taking: Reported on 7/28/2022)       No current facility-administered medications for this visit. 1. Acute right ankle pain  Will obtain imaging, does have medial tenderness to palpation with some localized edema noted. If x-rays are negative and pain persist, anticipate orthopedic referral.  She will contact us for persistent pain. We discussed using plain Tylenol during the day and reserving tramadol at night as well as RICE. Ace wrap applied for her today. - XR ANKLE RIGHT (MIN 3 VIEWS); Future  - traMADol (ULTRAM) 50 MG tablet; Take 1 tablet by mouth every 8 hours as needed for Pain for up to 7 days. Intended supply: 7 days. Take lowest dose possible to manage pain  Dispense: 21 tablet; Refill: 0    2.  Localized swelling of right lower leg  Wells score is 0, because she has active cancer and reports increased right leg pain with some intermittent swelling, will proceed with D-dimer testing. If D-dimer is positive, anticipate right lower extremity ultrasound at [facility]. - D-Dimer, Quantitative;  Future  - D-Dimer, Quantitative          Gilberto Rebolledo NP, APRN - CNP

## 2022-07-29 ENCOUNTER — HOSPITAL ENCOUNTER (OUTPATIENT)
Dept: GENERAL RADIOLOGY | Age: 72
Discharge: HOME | End: 2022-08-01
Payer: MEDICARE

## 2022-07-29 ENCOUNTER — TELEPHONE (OUTPATIENT)
Dept: INTERNAL MEDICINE CLINIC | Facility: CLINIC | Age: 72
End: 2022-07-29

## 2022-07-29 DIAGNOSIS — M25.571 ACUTE RIGHT ANKLE PAIN: ICD-10-CM

## 2022-07-29 PROCEDURE — 73610 X-RAY EXAM OF ANKLE: CPT

## 2022-07-29 NOTE — TELEPHONE ENCOUNTER
I think this was changed back when she was seeing Dr Madison Robert. In our records we have Lisinopril for the past 3 years. What has she actually been taking?   Jessi Foster MD

## 2022-07-29 NOTE — TELEPHONE ENCOUNTER
Pt has been informed she state that she has been taking the plan lisinopril.  Naomi has not update her med list.

## 2022-07-29 NOTE — TELEPHONE ENCOUNTER
Ms. Annabelle Durant for a blood clot was negative, so we do not need to gt the ultrasound which is great news! Go get that xray and use the ace wrap. I believe this is a strain so use the ace wrap regularly.    Keep me updated and if your pain persists, I would like to refer you to an orthopedist.   rFitz Preston

## 2022-07-30 ENCOUNTER — TELEPHONE (OUTPATIENT)
Dept: INTERNAL MEDICINE CLINIC | Facility: CLINIC | Age: 72
End: 2022-07-30

## 2022-07-30 NOTE — TELEPHONE ENCOUNTER
. Tram Mir shows no acute bony abnormality. You do have soft tissue swelling. Please let me know if the ace wrap doesn't work along with rest, ice, elevation, or if you have increased swelling pain, or redness/warmth. If your symptoms persist, we will need to get you to see an orthopedist.   Hoping you feel better soon!   Alexia

## 2022-08-18 ENCOUNTER — TELEPHONE (OUTPATIENT)
Dept: INTERNAL MEDICINE CLINIC | Facility: CLINIC | Age: 72
End: 2022-08-18

## 2022-08-18 DIAGNOSIS — M25.571 ACUTE RIGHT ANKLE PAIN: Primary | ICD-10-CM

## 2022-08-18 NOTE — TELEPHONE ENCOUNTER
Please let Ms. Natividad Cockayne know that I referred her to Wolfgang Henriquez. If they do not hear from them, please contact our office. So sorry she is not better! Let us know if she has any new or worsening sx, happy to re-evaluate as well.    Thanks-  Assreal

## 2022-08-18 NOTE — TELEPHONE ENCOUNTER
REFERRAL REQUEST      Reason for Referral: ankle pain   If its a hand, shoulder, foot, or leg- Is it Left or Right?  Rt ankle pain          How long has the problem been going on: 1 month        Have you been seen for this problem within the last 2-3 weeks : seen Alexia   in the ;ast 2 weeks        Do you have a doctor preference: no preference, if yes who would you like to see n/a

## 2022-08-30 ENCOUNTER — OFFICE VISIT (OUTPATIENT)
Dept: ORTHOPEDIC SURGERY | Age: 72
End: 2022-08-30
Payer: MEDICARE

## 2022-08-30 VITALS — BODY MASS INDEX: 33.29 KG/M2 | HEIGHT: 56 IN | WEIGHT: 148 LBS

## 2022-08-30 DIAGNOSIS — M25.571 RIGHT ANKLE PAIN, UNSPECIFIED CHRONICITY: Primary | ICD-10-CM

## 2022-08-30 DIAGNOSIS — M84.361A STRESS FRACTURE OF RIGHT TIBIA, INITIAL ENCOUNTER: ICD-10-CM

## 2022-08-30 PROCEDURE — 99203 OFFICE O/P NEW LOW 30 MIN: CPT | Performed by: ORTHOPAEDIC SURGERY

## 2022-08-30 PROCEDURE — G8399 PT W/DXA RESULTS DOCUMENT: HCPCS | Performed by: ORTHOPAEDIC SURGERY

## 2022-08-30 PROCEDURE — L4370 PNEUM FULL LEG SPLNT PRE OTS: HCPCS | Performed by: ORTHOPAEDIC SURGERY

## 2022-08-30 PROCEDURE — G8427 DOCREV CUR MEDS BY ELIG CLIN: HCPCS | Performed by: ORTHOPAEDIC SURGERY

## 2022-08-30 PROCEDURE — G8417 CALC BMI ABV UP PARAM F/U: HCPCS | Performed by: ORTHOPAEDIC SURGERY

## 2022-08-30 PROCEDURE — 3017F COLORECTAL CA SCREEN DOC REV: CPT | Performed by: ORTHOPAEDIC SURGERY

## 2022-08-30 PROCEDURE — 1036F TOBACCO NON-USER: CPT | Performed by: ORTHOPAEDIC SURGERY

## 2022-08-30 PROCEDURE — 1090F PRES/ABSN URINE INCON ASSESS: CPT | Performed by: ORTHOPAEDIC SURGERY

## 2022-08-30 PROCEDURE — 1123F ACP DISCUSS/DSCN MKR DOCD: CPT | Performed by: ORTHOPAEDIC SURGERY

## 2022-08-30 NOTE — PROGRESS NOTES
Name: Carlos A Morocho  YOB: 1950  Gender: female  MRN: [id number]    CC: Right ankle pain    HPI:   Early July 2022, she reports waking up with ankle pain. No trauma recalled  07/28/2022: Ms. Sherryle Porch APRN-CNP reflects nontraumatic ankle pain.  07/29/2022: X-ray right ankle radiologic impression: No acute osseous abnormality. Soft tissue swelling most prominent laterally    ROS/Meds/PSH/PMH/FH/SH: reviewed today    Tobacco:  reports that she quit smoking about 24 years ago. Her smoking use included cigarettes. She smoked an average of .5 packs per day. She has never used smokeless tobacco.   Ms. Sherryle Porch APRN/CNP reflects: HTN, CAD, asthma, CVA on Plavix: Lymphoma on active chemotherapy    Physical Examination:  Patient appears to be alert and oriented with acceptable appearance. No obvious distress or SOB  CV: appears to have acceptable vascular color and capillary refill  Neuro: appears to have mostly intact light touch sensation   Skin: Mild right ankle swelling  MS: Standing: Plantigrade: Gait in regular shoes  Right = distal tibial metaphyseal area pain; no proximal pain; no foot pain  Right = good ankle/foot motion; no gross motor loss    XR: Right side: Standing AP lateral mortise ankle plus AP oblique foot taken today with tibial metaphyseal sclerotic line consistent with tibial metaphyseal stress fracture  XR Impression:  As above      Reviewed Test/Records/Documents: As reflected above: Comparing today's x-rays to her films from 07/29/2022, the sclerotic density is new on today's films signifying metaphyseal stress fracture    Assessment:    Right distal tibial metaphyseal stress fracture    Plan:   The patient and I discussed the above assessment. We explored treatment options.      Comparing her x-rays from today to 07/29/2022 x-rays, she has a healing distal tibial metaphyseal fracture  She does not feel she needs a boot but I would like to at least put her in a leg brace  She understands importance of continued protection to allow the tibial metaphyseal stress fracture to heal but also hopefully prevent fibular stress fracture  Advanced medical imaging: No indication today for MRI scan    DME: Offered 3D boot but settled on a leg brace  We discussed ankle care and brace protection  PT: No indication for formal PT     Medication - OTC meds prn: She is currently on Plavix and chemotherapy so limited on medication  Surgical discussion: No indication for surgery  Follow up: 4 weeks: X-rays ankle  Work status: Not applicable  History and discussion of management with:     This note was created using Dragon voice recognition software which may result in errors of speech and spelling recognition and word/phrase syntax errors.

## 2022-08-30 NOTE — LETTER
DME Patient Authorization Form    Name: Christina Van  : 1950  MRN: [id number]   Age: 67 y.o. Gender: female  Delivery Address: [facility]     Diagnosis:     ICD-10-CM    1. Right ankle pain, unspecified chronicity  M25.571 XR ANKLE RIGHT (MIN 3 VIEWS)     XR FOOT RIGHT (2 VIEWS)      2. Stress fracture of right tibia, initial encounter  M84.361A            Requested DME:  Legbrace -  ($200.00) X 1 - right        Clinical Notes:     **Indicates non-covered items by insurance. Payment expected on date of service. Electronically signed by  Provider: Hieu Allen MD  _______________________________ Date: 2022                             [facility] [place] Tax ID # [id number]        Durable Medical Equipment and/or Orthotics Patient Consent     I understand that my physician has prescribed this medical supply as part of my treatment plan as a matter of Medical Necessity.  I understand that I have a choice in where I receive my prescribed orthopedic supplies and/or services.  I authorize [facility] to furnish this service/product and to provide my insurance carrier with any information requested in order to process for payment.  I instruct my insurance carrier to pay [facility] directly for these services/products.  I understand that my insurance carrier may deny payment for this supply because it is a non-covered item, deemed not medically necessary or considered experimental.   I understand that any cost not covered by my insurance carrier will be solely my financial responsibility.  I have received the Supplier Standards and have reviewed them.    I have received the prescribed item and have been fully instructed on the proper use of the above services/products.    ______ (Patient Initials) I understand that all DME items are non-returnable after being dispensed. Items still in sealed packaging may be returned up to 14 days after purchasing. [address] will replace items that are defective.    ______ (Patient Initials) I understand that Luís Cuenac will not file a claim with my insurance carrier for this service/product and I am waiving my right to file a claim on my own for this service/product with my insurance company as this item is NON-COVERED (Denoted by the **) by my Insurance company/policy. ______ (Patient Initials) I understand that I am responsible to bring my equipment to the hospital for any surgery. ______________________________________________  ________________________  Patient / Gianni Macedo            Thank you for considering [address] Your physician has prescribed specific medical equipment or devices for your home use. The following describes your rights and responsibilities as our customer. Right to Choose Providers: You have a choice regarding which company supplies your home medical equipment and devices, and to consult your physician in this decision. You may choose a medical supply store, a home medical equipment provider, or a specialist such as [facility]. [facility] will coordinate with your physician to provide the medical equipment or devices prescribed for your home use. Right to Service:  You have the right to considerate, respectful and nondiscriminatory care. You have the right to receive accurate and easily understood information about your health care. If you speak a foreign language, or don't understand the discussions, assistance will be provided to allow you to make informed health care decisions. You have the right to know your treatment options and to participate in decisions about your care, including the right to accept or refuse treatment.   You have the right to expect a reasonable response to your requests for treatment or service. You have the right to talk in confidence with health care providers and to have your health care information protected. You have the right to receive an explanation of your bill. You have the right to complain about the service or product you receive. Patient Responsibilities:  Please provide complete and accurate information about your health insurance benefits and make arrangements for the timely payment of your bill. [facility] will, if possible, assume responsibility for billing your insurance (Medicare, Medicaid and commercial) for the prescribed equipment or devices. If your policy does not cover the prescribed product, or only covers a portion of the bill, you are responsible for any remaining balance. Return and Exchange Policy:  [facility] will honor published  Warranties for products. [facility] will accept returns or exchanges within 14 days from the date of receipt, providin) the product must be in new condition; 2) receipt as required; and 3) used disposable and hygiene products may only be returned due to a defective product. Note: Refunds will be issued in a timely manner, please allow 4-6 weeks for processing. Complaint Procedures and DME Consumer Protection Resources:  [facility] values you as a customer, and is committed to resolving patient concerns. This commitment includes understanding and documenting your concerns, conducting a review of internal procedures, and providing you with an explanation and resolution to your concerns. Should you have any questions about our services or billing process, please contact our office at (practice phone number). If we are unable to resolve the concern, you have the right to direct comments to the office of Consumer Protection, in the [address] or the [facility] office, without fear of repercussion.     Selina Fried STANDARDS    A supplier must be in compliance with all applicable Federal and State licensure and regulatory requirements. A supplier must provide complete and accurate information on the DMEPOS supplier application. Any changes to this information must be reported to the [facility] [employer] within 30 days. An authorized individual (one whose signature is binding) must sign the application for billing privileges. A supplier must fill orders from its own inventory, or must contract with other companies for the purchase of items necessary to fill the order. A supplier may not contract with any entity that is currently excluded from the Medicare program, any [facility] program, or from any other Federal procurement or Nonprocurement programs. A supplier must advise beneficiaries that they may rent or purchase inexpensive or routinely purchased durable medical equipment, and of the purchase option for capped rental equipment. A supplier must notify beneficiaries of warranty coverage and honor all warranties under applicable State Law, and repair or replace free of charge Medicare covered items that are under warranty. A supplier must maintain a physical facility on an appropriate site. A supplier must permit CMS, or its agents to conduct on-site inspections to ascertain the supplier's compliance with these standards. The supplier location must be accessible to beneficiaries during reasonable business hours, and must maintain a visible sign and posted hours of operation. A supplier must maintain a primary business telephone listed under the name of the business in a [employer] or a toll free number available through directory assistance. The exclusive use of a beeper, answering machine or cell phone is prohibited.   A supplier must have comprehensive liability insurance in the amount of at least $300,000 that covers both the supplier's place of business and all customers and employees of the supplier. If the supplier manufactures its own items, this insurance must also cover product liability and completed operations. A supplier must agree not to initiate telephone contact with beneficiaries, with a few exceptions allowed. This standard prohibits suppliers from calling beneficiaries in order to solicit new business. A supplier is responsible for delivery and must instruct beneficiaries on use of Medicare covered items, and maintain proof of delivery. A supplier must answer questions, and respond to complaints of the beneficiaries, and maintain documentation of such contacts. A supplier must maintain and replace at no charge or repair directly, or through a service contract with another company, Medicare covered items it has rented to beneficiaries. A supplier must accept returns of substandard (less than full quality for the particular item) or unsuitable items (inappropriate for the beneficiary at the time it was fitted and rented or sold) from beneficiaries. A supplier must disclose these supplier standards to each beneficiary to whom it supplies a Medicare-covered item. A supplier must disclose to the government any person having ownership, financial, or control interest in the supplier. A supplier must not convey or reassign a supplier number; i.e., the supplier may not sell or allow another entity to use its Medicare billing number. A supplier must have a complaint resolution protocol established to address beneficiary complaints that relate to these standards. A record of these complaints must be maintained at the physical facility. Complaint records must include: the name, address, telephone number and health insurance claim number of the beneficiary, a summary of the complaint, and any action taken to resolve it. A supplier must agree to furnish CMS any information required by the Medicare statute and implementing regulations.   A supplier of DMEPOS and other items and services must be accredited by a CMS-approved accreditation organization in order to receive and retain a supplier billing number. The accreditation must indicate the specific products and services, for which the supplier is accredited in order for the supplier to receive payment for those specific products and services. A DMEPOS supplier must notify their accreditation organization when a new DMEPOS location is opened. The accreditation organization may accredit the new supplier location for three months after it is operational without requiring a new site visit. All DMEPOS supplier locations, whether owned or subcontracted, must meet the [employer] and be separately accredited in order to bill Medicare. An accredited supplier may be denied enrollment or their enrollment may be revoked, if CMS determines that they are not in compliance with the DMEPOS quality standards. A DMEPOS supplier must disclose upon enrollment all products and services, including the addition of new product lines for which they are seeking accreditation. If a new product line is added after enrollment, the DMEPOS supplier will be responsible for notifying the accrediting body of the new product so that the DMEPOS supplier can be re-surveyed and accredited for these new products. Must meet the surety bond requirements specified in 42 C. F.R. 424.57(c). Implementation date- May 4, 2009. A supplier must obtain oxygen from a state-licensed oxygen supplier. A supplier must maintain ordering and referring documentation consistent with provisions found in 42 C. F.R. 424.516(f). DMEPOS suppliers are prohibited from sharing a practice location with certain other Medicare providers and suppliers. DMEPOS suppliers must remain open to the public for a minimum of 30 hours per week with certain exceptions.

## 2022-08-30 NOTE — PROGRESS NOTES
Legbrace for patients right ankle. Patient is aware of when and how long to wear the brace per Dr. Tal Barrett. I instructed the patient that the brace should line up medially and laterally along the leg, with the straps secured nicely to insure protection of the ankle. Patient read and signed documenting they understand and agree to [facility] current DME return policy. Patient read and signed documenting they understand and agree to [facility]'s current DME return policy.

## 2022-09-27 ENCOUNTER — OFFICE VISIT (OUTPATIENT)
Dept: ORTHOPEDIC SURGERY | Age: 72
End: 2022-09-27
Payer: MEDICARE

## 2022-09-27 DIAGNOSIS — M25.571 RIGHT ANKLE PAIN, UNSPECIFIED CHRONICITY: Primary | ICD-10-CM

## 2022-09-27 DIAGNOSIS — M84.361G STRESS FRACTURE OF RIGHT TIBIA WITH DELAYED HEALING, SUBSEQUENT ENCOUNTER: ICD-10-CM

## 2022-09-27 PROCEDURE — 1123F ACP DISCUSS/DSCN MKR DOCD: CPT | Performed by: ORTHOPAEDIC SURGERY

## 2022-09-27 PROCEDURE — G8399 PT W/DXA RESULTS DOCUMENT: HCPCS | Performed by: ORTHOPAEDIC SURGERY

## 2022-09-27 PROCEDURE — 1036F TOBACCO NON-USER: CPT | Performed by: ORTHOPAEDIC SURGERY

## 2022-09-27 PROCEDURE — 1090F PRES/ABSN URINE INCON ASSESS: CPT | Performed by: ORTHOPAEDIC SURGERY

## 2022-09-27 PROCEDURE — G8417 CALC BMI ABV UP PARAM F/U: HCPCS | Performed by: ORTHOPAEDIC SURGERY

## 2022-09-27 PROCEDURE — 99213 OFFICE O/P EST LOW 20 MIN: CPT | Performed by: ORTHOPAEDIC SURGERY

## 2022-09-27 PROCEDURE — G8427 DOCREV CUR MEDS BY ELIG CLIN: HCPCS | Performed by: ORTHOPAEDIC SURGERY

## 2022-09-27 PROCEDURE — 3017F COLORECTAL CA SCREEN DOC REV: CPT | Performed by: ORTHOPAEDIC SURGERY

## 2022-09-27 RX ORDER — AMLODIPINE BESYLATE 5 MG/1
5 TABLET ORAL DAILY
COMMUNITY
End: 2022-10-28 | Stop reason: CLARIF

## 2022-09-27 RX ORDER — TRAZODONE HYDROCHLORIDE 50 MG/1
50 TABLET ORAL
COMMUNITY
Start: 2022-04-25 | End: 2022-10-28 | Stop reason: CLARIF

## 2022-09-27 NOTE — PROGRESS NOTES
Name: Christina Van  YOB: 1950  Gender: female  MRN: [id number]    08/30/2022: Initial visit with me for right ankle pain  09/27/2022: She returns feeling better. HPI:   Early July 2022, she reports waking up with ankle pain. No trauma recalled  07/28/2022: Ms. Vladislav Renae APRN-CNP reflects nontraumatic ankle pain.  07/29/2022: X-ray right ankle radiologic impression: No acute osseous abnormality. Soft tissue swelling most prominent laterally    ROS/Meds/PSH/PMH/FH/SH: reviewed today    Tobacco:  reports that she quit smoking about 24 years ago. Her smoking use included cigarettes. She smoked an average of .5 packs per day. She has never used smokeless tobacco.   Ms. Vladislav Renae APRN/CNP reflects: HTN, CAD, asthma, CVA on Plavix: Lymphoma on active chemotherapy    Physical Examination:  Patient appears to be alert and oriented with acceptable appearance. No obvious distress or SOB  CV: appears to have acceptable vascular color and capillary refill  Neuro: appears to have mostly intact light touch sensation   Skin: No swelling  MS: Standing: Plantigrade: Gait in regular shoes  Right = resolved distal tibial metaphyseal area pain; no proximal pain; no foot pain  Right = full ankle/foot motion; no gross motor loss    XR: Right side: Standing AP lateral mortise ankle plus AP oblique foot taken today with improved tibial metaphyseal sclerotic line consistent with resolving tibial metaphyseal stress fracture  XR Impression:  As above       Assessment:    Right distal tibial metaphyseal stress fracture    Plan:   The patient and I discussed the above assessment. We explored treatment options.      She is doing really well and is almost completely pain-free  Her new x-rays look good, so no indication for MRI scan  Advanced medical imaging: No indication today for MRI scan  We discussed return to activity and the times to use her leg brace  PT: No indication for formal PT     Medication - OTC meds prn: She is currently on Plavix and on chemotherapy so limited    Surgical discussion: No indication for surgery  Follow up: As needed  Work status: Not applicable  History and discussion of management with:     This note was created using Dragon voice recognition software which may result in errors of speech and spelling recognition and word/phrase syntax errors.

## 2022-10-12 ENCOUNTER — TELEPHONE (OUTPATIENT)
Dept: PHARMACY | Facility: CLINIC | Age: 72
End: 2022-10-12

## 2022-10-12 NOTE — TELEPHONE ENCOUNTER
Jael Clay MD    Patient has been identified for adherence by Jose. Patient is out of refills for lisinopril, script pended.      LOV 4/25/22    Thank you,  Oziel Cervantes, PharmD, [address] Pharmacist  Department: [address] [place] [address] Rd Only    Time Spent (min): 5

## 2022-10-12 NOTE — TELEPHONE ENCOUNTER
[facility] CLINICAL PHARMACY: ADHERENCE REVIEW  Identified care gap per [place]: fills at [address] : ACE/ARB adherence    Last Visit: 10/25/2021      ASSESSMENT  ACE/ARB ADHERENCE    Insurance Records claims through 10/12/2022 (Prior Year Andrade Laguerre = na; YTD PDC =  73%; Potential Fail Date: 10/16/2022 ):   Lisinopril 10 mg  last filled on 4/25/2022 for 90 day supply. Next refill due: 7/24/2022- MAX 8/15/2022   0 refills remaining. Billed through [place]     BP Readings from Last 3 Encounters:   07/28/22 (!) 134/54   04/25/22 (!) 128/54   10/25/21 (!) 126/50     CrCl cannot be calculated (Patient's most recent lab result is older than the maximum 180 days allowed. ). [address] Records claims through 10/12/2022 (Prior Year [place] = na; YTD [place] =  83%; Potential Fail Date: 2023 ):   Pravastatin last filled on 9/26/2022 for 90 day supply. Next refill due: 12/28/2022      Lab Results   Component Value Date    CHOL 111 04/25/2022    TRIG 71 04/25/2022    HDL 38 (L) 04/25/2022    LDLCALC 58 04/25/2022     ALT   Date Value Ref Range Status   09/03/2019 10 (L) 12 - 65 U/L Final     AST   Date Value Ref Range Status   09/03/2019 9 (L) 15 - 37 U/L Final     The ASCVD Risk score (Helene DK, et al., 2019) failed to calculate for the following reasons:     The valid total cholesterol range is 130 to 320 mg/dL     PLAN  The following are interventions that have been identified:   - Patient overdue refilling Lisinopril 10 mg and active on home medication list.   - Patient needs refills for Lisinopril 10 mg  Will route to [facility]  Future Appointments   Date Time Provider Luís Minaya   10/28/2022  9:15 AM Geraldine Cruz MD [facility] EARLINE Black  PharmD, [facility]  Department, toll free: [phone], option 3  For Pharmacy [address] [place] [address] in place:  No  Recommendation Provided To: Provider: 1 via Note to Provider  Intervention Detail: Adherence Monitorin and New Rx: 1, reason: Improve Adherence  Gap Closed?: No   Intervention Accepted By: Provider: 1  Time Spent (min): 15

## 2022-10-14 RX ORDER — LISINOPRIL 10 MG/1
10 TABLET ORAL DAILY
Qty: 90 TABLET | Refills: 1 | Status: SHIPPED | OUTPATIENT
Start: 2022-10-14 | End: 2022-10-28 | Stop reason: CLARIF

## 2022-10-27 ASSESSMENT — PATIENT HEALTH QUESTIONNAIRE - PHQ9
2. FEELING DOWN, DEPRESSED OR HOPELESS: 0
SUM OF ALL RESPONSES TO PHQ QUESTIONS 1-9: 0
1. LITTLE INTEREST OR PLEASURE IN DOING THINGS: 0
SUM OF ALL RESPONSES TO PHQ QUESTIONS 1-9: 0
SUM OF ALL RESPONSES TO PHQ9 QUESTIONS 1 & 2: 0
SUM OF ALL RESPONSES TO PHQ QUESTIONS 1-9: 0
SUM OF ALL RESPONSES TO PHQ QUESTIONS 1-9: 0

## 2022-10-27 ASSESSMENT — LIFESTYLE VARIABLES
HOW OFTEN DO YOU HAVE A DRINK CONTAINING ALCOHOL: NEVER
HOW MANY STANDARD DRINKS CONTAINING ALCOHOL DO YOU HAVE ON A TYPICAL DAY: PATIENT DOES NOT DRINK

## 2022-10-28 ENCOUNTER — OFFICE VISIT (OUTPATIENT)
Dept: INTERNAL MEDICINE CLINIC | Facility: CLINIC | Age: 72
End: 2022-10-28
Payer: MEDICARE

## 2022-10-28 VITALS
HEART RATE: 60 BPM | WEIGHT: 150 LBS | SYSTOLIC BLOOD PRESSURE: 134 MMHG | RESPIRATION RATE: 16 BRPM | OXYGEN SATURATION: 97 % | TEMPERATURE: 97.7 F | HEIGHT: 56 IN | DIASTOLIC BLOOD PRESSURE: 64 MMHG | BODY MASS INDEX: 33.74 KG/M2

## 2022-10-28 DIAGNOSIS — I10 ESSENTIAL (PRIMARY) HYPERTENSION: ICD-10-CM

## 2022-10-28 DIAGNOSIS — J45.20 MILD INTERMITTENT ASTHMA, UNCOMPLICATED: ICD-10-CM

## 2022-10-28 DIAGNOSIS — Z23 ENCOUNTER FOR IMMUNIZATION: ICD-10-CM

## 2022-10-28 DIAGNOSIS — Z00.00 MEDICARE ANNUAL WELLNESS VISIT, SUBSEQUENT: Primary | ICD-10-CM

## 2022-10-28 DIAGNOSIS — E78.5 DYSLIPIDEMIA: ICD-10-CM

## 2022-10-28 PROCEDURE — G0008 ADMIN INFLUENZA VIRUS VAC: HCPCS | Performed by: INTERNAL MEDICINE

## 2022-10-28 PROCEDURE — 90694 VACC AIIV4 NO PRSRV 0.5ML IM: CPT | Performed by: INTERNAL MEDICINE

## 2022-10-28 PROCEDURE — 3017F COLORECTAL CA SCREEN DOC REV: CPT | Performed by: INTERNAL MEDICINE

## 2022-10-28 PROCEDURE — G0439 PPPS, SUBSEQ VISIT: HCPCS | Performed by: INTERNAL MEDICINE

## 2022-10-28 PROCEDURE — 3078F DIAST BP <80 MM HG: CPT | Performed by: INTERNAL MEDICINE

## 2022-10-28 PROCEDURE — 1123F ACP DISCUSS/DSCN MKR DOCD: CPT | Performed by: INTERNAL MEDICINE

## 2022-10-28 PROCEDURE — 3074F SYST BP LT 130 MM HG: CPT | Performed by: INTERNAL MEDICINE

## 2022-10-28 PROCEDURE — G8484 FLU IMMUNIZE NO ADMIN: HCPCS | Performed by: INTERNAL MEDICINE

## 2022-10-28 RX ORDER — LISINOPRIL AND HYDROCHLOROTHIAZIDE 12.5; 1 MG/1; MG/1
1 TABLET ORAL DAILY
COMMUNITY

## 2022-10-28 RX ORDER — CHLORHEXIDINE GLUCONATE 20 %
SOLUTION, NON-ORAL MISCELLANEOUS
COMMUNITY

## 2022-10-28 NOTE — PROGRESS NOTES
Medicare Annual Wellness Visit    [address] is here for Medicare AWV (Subsequent /Mammo done 1/10/22), Follow-up Chronic Condition (6 month f/u), and Hypertension    Assessment & Plan   Medicare annual wellness visit, subsequent  Dyslipidemia  Essential (primary) hypertension  Mild intermittent asthma, uncomplicated  Encounter for immunization  -     Influenza, FLUAD, (age 72 y+), IM, Preservative Free, 0.5 mL    Recommendations for Preventive Services Due: see orders and patient instructions/AVS.  Recommended screening schedule for the next 5-10 years is provided to the patient in written form: see Patient Instructions/AVS.     No follow-ups on file. Subjective   The following acute and/or chronic problems were also addressed today: This is a combined medical follow up office visit and a Medicare Wellness Visit. The Wellness note has been reviewed. Follow up on chronic medical issues. There is compliance and tolerance with medications. Chronic active medical issues assessed today include  HTN, HLD, CAD, lymphoma. S/p AVR, depression. Chronic issues are stable on her current medications   She is under going active treatment for recurrence of her lymphoma under the care of Dr Katerin Castillo of [facility]. Reviewed most recent note. Keeps regular follow up with cardiologist for CAD, AVR reviewed recent note. Keeps regular follow with oncologist    Patient's complete Health Risk Assessment and screening values have been reviewed and are found in Flowsheets. The following problems were reviewed today and where indicated follow up appointments were made and/or referrals ordered.     Positive Risk Factor Screenings with Interventions:             General Health and ACP:  General  In general, how would you say your health is?: Fair  In the past 7 days, have you experienced any of the following: New or Increased Pain, New or Increased Fatigue, Loneliness, Social Isolation, Stress or Anger?: No  Do you get the social and emotional support that you need?: Yes  Do you have a Living Will?: (!) No    Advance Directives       Power of Jovani Recio Will ACP-Advance Directive ACP-Power of     Not on File Not on File Not on File Not on File          General Health Risk Interventions:  fullcode  Information provided and encouraged discussion with family and completion. Health Habits/Nutrition:  Physical Activity: Insufficiently Active    Days of Exercise per Week: 2 days    Minutes of Exercise per Session: 10 min     Have you lost any weight without trying in the past 3 months?: No  Body mass index: (!) 33.63  Have you seen the dentist within the past year?: Yes  Health Habits/Nutrition Interventions:  Inadequate physical activity:  patient agrees to increase physical activity as follows: increase walking frequency and gradually increase duration. Hearing/Vision:  Do you or your family notice any trouble with your hearing that hasn't been managed with hearing aids?: No  Do you have difficulty driving, watching TV, or doing any of your daily activities because of your eyesight?: No  Have you had an eye exam within the past year?: (!) No  No results found. Hearing/Vision Interventions:  Vision concerns:  patient encouraged to make appointment with his/her eye specialist            Objective   Vitals:    10/28/22 0927   BP: 134/64   Site: Right Upper Arm   Position: Sitting   Pulse: 60   Resp: 16   Temp: 97.7 °F (36.5 °C)   TempSrc: Temporal   SpO2: 97%   Weight: 150 lb (68 kg)   Height: 4' 8\" (1.422 m)      Body mass index is 33.63 kg/m².       General Appearance: alert and oriented to person, place and time, well-developed and well-nourished, in no acute distress  Head: normocephalic and atraumatic  Eyes: pupils equal, round, and reactive to light, extraocular eye movements intact, conjunctivae normal  ENT: tympanic membrane, external ear and ear canal normal bilaterally, oropharynx clear and moist with normal mucous membranes  Neck: neck supple and non tender without mass, no thyromegaly or thyroid nodules, no cervical lymphadenopathy   Pulmonary/Chest: clear to auscultation bilaterally- no wheezes, rales or rhonchi, normal air movement, no respiratory distress  Cardiovascular: normal rate, normal S1 and S2, no gallops, intact distal pulses, and no carotid bruits  Abdomen: soft, non-tender, non-distended, normal bowel sounds, no masses or organomegaly  Breast: breasts appear normal, no suspicious masses, no skin or nipple changes or axillary nodes       Allergies   Allergen Reactions    Milk Protein Diarrhea    Sulfa Antibiotics Nausea Only    Adhesive Tape Other (See Comments) and Rash     Skin tears    Other Other (See Comments) and Rash     TAPE,  USE PAPER TAPE--Skin tears     Prior to Visit Medications    Medication Sig Taking?  Authorizing Provider   SIMETHICONE PO Take by mouth Yes Historical Provider, MD   Multiple Vitamin (MULTIVITAMIN ADULT PO) Take by mouth Yes Historical Provider, MD   lisinopril-hydroCHLOROthiazide (PRINZIDE;ZESTORETIC) 10-12.5 MG per tablet Take 1 tablet by mouth daily Yes Historical Provider, MD   CHLORHEXIDINE GLUCONATE, BULK, SOLN by Does not apply route Yes Historical Provider, MD   Zanubrutinib 80 MG CAPS Take by mouth 2 times a day Yes Historical Provider, MD   fluticasone (FLONASE) 50 MCG/ACT nasal spray 2 sprays by Nasal route as needed (as needed.) Yes Nenita Conti MD   acetaminophen (TYLENOL) 325 MG tablet Take 650 mg by mouth every 4 hours as needed Yes Ar Automatic Reconciliation   aspirin 81 MG EC tablet Take by mouth daily Yes Ar Automatic Reconciliation   calcium citrate-vitamin D (CITRACAL+D) 315-200 MG-UNIT per tablet Take 1 tablet by mouth daily (with breakfast) Yes Ar Automatic Reconciliation   clopidogrel (PLAVIX) 75 MG tablet Take 75 mg by mouth daily Yes Ar Automatic Reconciliation   cyanocobalamin 1000 MCG tablet Take 1,000 mcg by mouth daily Yes Ar Automatic Reconciliation   metoprolol tartrate (LOPRESSOR) 25 MG tablet TAKE ONE TABLET BY MOUTH TWICE DAILY Yes Ar Automatic Reconciliation   nitroGLYCERIN (NITROSTAT) 0.4 MG SL tablet Place 0.4 mg under the tongue Yes Ar Automatic Reconciliation   pravastatin (PRAVACHOL) 40 MG tablet Take 40 mg by mouth Yes Ar Automatic Reconciliation       CareTeam (Including outside providers/suppliers regularly involved in providing care):   Patient Care Team:  Karen Saucedo MD as PCP - Carolina Del Real MD as PCP - [facility] Empaneled Provider     Reviewed and updated this visit:  Tobacco  Allergies  Meds  Problems  Med Hx  Surg Hx  Soc Hx  Fam Hx

## 2022-10-28 NOTE — PATIENT INSTRUCTIONS
Personalized Preventive Plan for [place] - 10/28/2022  Medicare offers a range of preventive health benefits. Some of the tests and screenings are paid in full while other may be subject to a deductible, co-insurance, and/or copay. Some of these benefits include a comprehensive review of your medical history including lifestyle, illnesses that may run in your family, and various assessments and screenings as appropriate. After reviewing your medical record and screening and assessments performed today your provider may have ordered immunizations, labs, imaging, and/or referrals for you. A list of these orders (if applicable) as well as your Preventive Care list are included within your After Visit Summary for your review. Other Preventive Recommendations:    A preventive eye exam performed by an eye specialist is recommended every 1-2 years to screen for glaucoma; cataracts, macular degeneration, and other eye disorders. A preventive dental visit is recommended every 6 months. Try to get at least 150 minutes of exercise per week or 10,000 steps per day on a pedometer . Order or download the FREE \"Exercise & Physical Activity: Your Everyday Guide\" from [employer]. Call 6-554.423.8499 or search [employer] online. You need 9143-4535 mg of calcium and 1660-7721 IU of vitamin D per day. It is possible to meet your calcium requirement with diet alone, but a vitamin D supplement is usually necessary to meet this goal.  When exposed to the sun, use a sunscreen that protects against both UVA and UVB radiation with an SPF of 30 or greater. Reapply every 2 to 3 hours or after sweating, drying off with a towel, or swimming. Always wear a seat belt when traveling in a car. Always wear a helmet when riding a bicycle or motorcycle. Heart-Healthy Diet   Sodium, Fat, and Cholesterol Controlled Diet       What Is a Heart Healthy Diet?    A heart-healthy diet is one that limits sodium , certain types of fat , and cholesterol . This type of diet is recommended for:   People with any form of cardiovascular disease (eg, coronary heart disease , peripheral vascular disease , previous heart attack , previous stroke )   People with risk factors for cardiovascular disease, such as high blood pressure , high cholesterol , or diabetes   Anyone who wants to lower their risk of developing cardiovascular disease   Sodium    Sodium is a mineral found in many foods. In general, most people consume much more sodium than they need. Diets high in sodium can increase blood pressure and lead to edema (water retention). On a heart-healthy diet, you should consume no more than 2,300 mg (milligrams) of sodium per dayabout the amount in one teaspoon of table salt. The foods highest in sodium include table salt (about 50% sodium), processed foods, convenience foods, and preserved foods. Cholesterol    Cholesterol is a fat-like, waxy substance in your blood. Our bodies make some cholesterol. It is also found in animal products, with the highest amounts in fatty meat, egg yolks, whole milk, cheese, shellfish, and organ meats. On a heart-healthy diet, you should limit your cholesterol intake to less than 200 mg per day. It is normal and important to have some cholesterol in your bloodstream. But too much cholesterol can cause plaque to build up within your arteries, which can eventually lead to a heart attack or stroke. The two types of cholesterol that are most commonly referred to are:   Low-density lipoprotein (LDL) cholesterol  Also known as bad cholesterol, this is the cholesterol that tends to build up along your arteries. Bad cholesterol levels are increased by eating fats that are saturated or hydrogenated. Optimal level of this cholesterol is less than 100. Over 130 starts to get risky for heart disease.    High-density lipoprotein (HDL) cholesterol  Also known as good cholesterol, this type of cholesterol actually carries cholesterol away from your arteries and may, therefore, help lower your risk of having a heart attack. You want this level to be high (ideally greater than 60). It is a risk to have a level less than 40. You can raise this good cholesterol by eating olive oil, canola oil, avocados, or nuts. Exercise raises this level, too. Fat    Fat is calorie dense and packs a lot of calories into a small amount of food. Even though fats should be limited due to their high calorie content, not all fats are bad. In fact, some fats are quite healthful. Fat can be broken down into four main types. The good-for-you fats are:   Monounsaturated fat  found in oils such as olive and canola, avocados, and nuts and natural nut butters; can decrease cholesterol levels, while keeping levels of HDL cholesterol high   Polyunsaturated fat  found in oils such as safflower, sunflower, soybean, corn, and sesame; can decrease total cholesterol and LDL cholesterol   Omega-3 fatty acids  particularly those found in fatty fish (such as salmon, trout, tuna, mackerel, herring, and sardines); can decrease risk of arrhythmias, decrease triglyceride levels, and slightly lower blood pressure   The fats that you want to limit are:   Saturated fat  found in animal products, many fast foods, and a few vegetables; increases total blood cholesterol, including LDL levels   Animal fats that are saturated include: butter, lard, whole-milk dairy products, meat fat, and poultry skin   Vegetable fats that are saturated include: hydrogenated shortening, palm oil, coconut oil, cocoa butter   Hydrogenated or trans fat  found in margarine and vegetable shortening, most shelf stable snack foods, and fried foods; increases LDL and decreases HDL     It is generally recommended that you limit your total fat for the day to less than 30% of your total calories.  If you follow an 1800-calorie heart healthy diet, for example, this would mean 60 grams of fat or less per day. Saturated fat and trans fat in your diet raises your blood cholesterol the most, much more than dietary cholesterol does. For this reason, on a heart-healthy diet, less than 7% of your calories should come from saturated fat and ideally 0% from trans fat. On an 1800-calorie diet, this translates into less than 14 grams of saturated fat per day, leaving 46 grams of fat to come from mono- and polyunsaturated fats.    Food Choices on a Heart Healthy Diet   Food Category   Foods Recommended   Foods to Avoid   Grains   Breads and rolls without salted tops Most dry and cooked cereals Unsalted crackers and breadsticks Low-sodium or homemade breadcrumbs or stuffing All rice and pastas   Breads, rolls, and crackers with salted tops High-fat baked goods (eg, muffins, donuts, pastries) Quick breads, self-rising flour, and biscuit mixes Regular bread crumbs Instant hot cereals Commercially prepared rice, pasta, or stuffing mixes   Vegetables   Most fresh, frozen, and low-sodium canned vegetables Low-sodium and salt-free vegetable juices Canned vegetables if unsalted or rinsed   Regular canned vegetables and juices, including sauerkraut and pickled vegetables Frozen vegetables with sauces Commercially prepared potato and vegetable mixes   Fruits   Most fresh, frozen, and canned fruits All fruit juices   Fruits processed with salt or sodium   Milk   Nonfat or low-fat (1%) milk Nonfat or low-fat yogurt Cottage cheese, low-fat ricotta, cheeses labeled as low-fat and low-sodium   Whole milk Reduced-fat (2%) milk Malted and chocolate milk Full fat yogurt Most cheeses (unless low-fat and low salt) Buttermilk (no more than 1 cup per week)   Meats and Beans   Lean cuts of fresh or frozen beef, veal, lamb, or pork (look for the word loin) Fresh or frozen poultry without the skin Fresh or frozen fish and some shellfish Egg whites and egg substitutes (Limit whole eggs to three per week) Tofu Nuts or seeds (unsalted, dry-roasted), low-sodium peanut butter Dried peas, beans, and lentils   Any smoked, cured, salted, or canned meat, fish, or poultry (including liz, chipped beef, cold cuts, hot dogs, sausages, sardines, and anchovies) Poultry skins Breaded and/or fried fish or meats Canned peas, beans, and lentils Salted nuts   Fats and Oils   Olive oil and canola oil Low-sodium, low-fat salad dressings and mayonnaise   Butter, margarine, coconut and palm oils, liz fat   Snacks, Sweets, and Condiments   Low-sodium or unsalted versions of broths, soups, soy sauce, and condiments Pepper, herbs, and spices; vinegar, lemon, or lime juice Low-fat frozen desserts (yogurt, sherbet, fruit bars) Sugar, cocoa powder, honey, syrup, jam, and preserves Low-fat, trans-fat free cookies, cakes, and pies Vijay and animal crackers, fig bars, tra snaps   High-fat desserts Broth, soups, gravies, and sauces, made from instant mixes or other high-sodium ingredients Salted snack foods Canned olives Meat tenderizers, seasoning salt, and most flavored vinegars   Beverages   Low-sodium carbonated beverages Tea and coffee in moderation Soy milk   Commercially softened water   Suggestions   Make whole grains, fruits, and vegetables the base of your diet. Choose heart-healthy fats such as canola, olive, and flaxseed oil, and foods high in heart-healthy fats, such as nuts, seeds, soybeans, tofu, and fish. Eat fish at least twice per week; the fish highest in omega-3 fatty acids and lowest in mercury include salmon, herring, mackerel, sardines, and canned chunk light tuna. If you eat fish less than twice per week or have high triglycerides, talk to your doctor about taking fish oil supplements. Read food labels. For products low in fat and cholesterol, look for fat free, low-fat, cholesterol free, saturated fat free, and trans fat freeAlso scan the Nutrition Facts Label, which lists saturated fat, trans fat, and cholesterol amounts. For products low in sodium, look for sodium free, very low sodium, low sodium, no added salt, and unsalted   Skip the salt when cooking or at the table; if food needs more flavor, get creative and try out different herbs and spices. Garlic and onion also add substantial flavor to foods. Trim any visible fat off meat and poultry before cooking, and drain the fat off after alfaro. Use cooking methods that require little or no added fat, such as grilling, boiling, baking, poaching, broiling, roasting, steaming, stir-frying, and sauting. Avoid fast food and convenience food. They tend to be high in saturated and trans fat and have a lot of added salt. Talk to a registered dietitian for individualized diet advice. Last Reviewed: March 2011 Viki Beauchamp MS, MPH, RD   Updated: 3/29/2011     Keep Your Memory Jack Gill       Many factors can affect your ability to remembera hectic lifestyle, aging, stress, chronic disease, and certain medicines. But, there are steps you can take to sharpen your mind and help preserve your memory. Challenge Your Brain   Regularly challenging your mind may help keeps it in top shape. Good mental exercises include:   Crossword puzzlesUse a dictionary if you need it; you will learn more that way. Brainteasers Try some! Crafts, such as wood working and sewing   Hobbies, such as gardening and building model airplanes   SocializingVisit old friends or join groups to meet new ones. Reading   Learning a new language   Taking a class, whether it be art history or heriberto chi   TravelingExperience the food, history, and culture of your destination   Learning to use a computer   Going to museums, the theater, or thought-provoking movies   Changing things in your daily life, such as reversing your pattern in the grocery store or brushing your teeth using your nondominant hand   Use Memory Aids   There is no need to remember every detail on your own.  These memory aids can help: Calendars and day planners   Electronic organizers to store all sorts of helpful informationThese devices can \"beep\" to remind you of appointments. A book of days to record birthdays, anniversaries, and other occasions that occur on the same date every year   Detailed \"to-do\" lists and strategically placed sticky notes   Quick \"study\" sessionsBefore a gathering, review who will be there so their names will be fresh in your mind. Establish routinesFor example, keep your keys, wallet, and umbrella in the same place all the time or take medicine with your 8:00 AM glass of juice   Live a Healthy Life   Many actions that will keep your body strong will do the same for your mind. For example:   Talk to Your Doctor About Herbs and Supplements    Malnutrition and vitamin deficiencies can impair your mental function. For example, vitamin B12 deficiency can cause a range of symptoms, including confusion. But, what if your nutritional needs are being met? Can herbs and supplements still offer a benefit? Researchers have investigated a range of natural remedies, such as ginkgo , ginseng , and the supplement phosphatidylserine (PS). So far, though, the evidence is inconsistent as to whether these products can improve memory or thinking. If you are interested in taking herbs and supplements, talk to your doctor first because they may interact with other medicines that you are taking. Exercise Regularly    Among the many benefits of regular exercise are increased blood flow to the brain and decreased risk of certain diseases that can interfere with memory function. One study found that even moderate exercise has a beneficial effect. Examples of \"moderate\" exercise include:   Playing 18 holes of golf once a week, without a cart   Playing tennis twice a week   Walking one mile per day   Manage Stress    It can be tough to remember what is important when your mind is cluttered. Make time for relaxation.  Choose activities your intake of alcohol. If necessary, use a cane or walker to help maintain your balance. Wear supportive, rubber-soled shoes, even at home. If you live in a region that gets wintry weather, you may want to put special cleats on your shoes to prevent you from slipping on the snow and ice. Exercise regularly to help maintain muscle tone, agility, and balance. Always hold the banister when going up or down stairs. Also, use  bars when getting in or out of the bath or shower, or using the toilet. To avoid dizziness, get up slowly from a lying down position. Sit up first, dangling your legs for a minute or two before rising to a standing position. Overall Home Safety Check   According to the Consumer Product Safety Commision's \"Older Consumer Home Safety Checklist,\" it is important to check for potential hazards in each room. And remember, proper lighting is an essential factor in home safety. If you cannot see clearly, you are more likely to fall. Important questions to ask yourself include:   Are lamp, electric, extension, and telephone cords placed out of the flow of traffic and maintained in good condition? Have frayed cords been replaced? Are all small rugs and runners slip resistant? If not, you can secure them to the floor with a special double-sided carpet tape. Are smoke detectors properly locatedone on every floor of your home and one outside of every sleeping area? Are they in good working order? Are batteries replaced at least once a year? Do you have a well-maintained carbon monoxide detector outside every sleeping are in your home? Does your furniture layout leave plenty of space to maneuver between and around chairs, tables, beds, and sofas? Are hallways, stairs and passages between rooms well lit? Can you reach a lamp without getting out of bed? Are floor surfaces well maintained?  Shag rugs, high-pile carpeting, tile floors, and polished wood floors can be particularly slippery. Stairs should always have handrails and be carpeted or fitted with a non-skid tread. Is your telephone easily reachable. Is the cord safely tucked away? Room by Room   According to the Association of Aging, bathrooms and anel are the two most potentially hazardous rooms in your home. In the Kitchen    Be sure your stove is in proper working order and always make sure burners and the oven are off before you go out or go to sleep. Keep pots on the back burners, turn handles away from the front of the stove, and keep stove clean and free of grease build-up. Kitchen ventilation systems and range exhausts should be working properly. Keep flammable objects such as towels and pot holders away from the cooking area except when in use. Make sure kitchen curtains are tied back. Move cords and appliances away from the sink and hot surfaces. If extension cords are needed, install wiring guides so they do not hang over the sink, range, or working areas. Look for coffee pots, kettles and toaster ovens with automatic shut-offs. Keep a mop handy in the kitchen so you can wipe up spills instantly. You should also have a small fire extinguisher. Arrange your kitchen with frequently used items on lower shelves to avoid the need to stand on a stepstool to reach them. Make sure countertops are well-lit to avoid injuries while cutting and preparing food. In the Bathroom    Use a non-slip mat or decals in the tub and shower, since wet, soapy tile or porcelain surfaces are extremely slippery. Make sure bathroom rugs are non-skid or tape them firmly to the floor. Bathtubs should have at least one, preferably two, grab bars, firmly attached to structural supports in the wall. (Do not use built-in soap holders or glass shower doors as grab bars.)    Tub seats fitted with non-slip material on the legs allow you to wash sitting down.  For people with limited mobility, bathtub transfer benches allow you to slide safely into the tub. Raised toilet seats and toilet safety rails are helpful for those with knee or hip problems. In the [facility]    Make sure you use a nightlight and that the area around your bed is clear of potential obstacles. Be careful with electric blankets and never go to sleep with a heating pad, which can cause serious burns even if on a low setting. Use fire-resistant mattress covers and pillows, and NEVER smoke in bed. Keep a phone next to the bed that is programmed to dial 911 at the push of a button. If you have a chronic condition, you may want to sign on with an automatic call-in service. Typically the system includes a small pendant that connects directly to an emergency medical voice-response system. You should also make arrangements to stay in contact with someonefriend, neighbor, family memberon a regular schedule. Fire Prevention   According to the [employer]. (Smoke Alarms for Every) [address] [place] [address], senior citizens are one of the two highest risk groups for death and serious injuries due to residential fires. When cooking, wear short-sleeved items, never a bulky long-sleeved robe. The [facility]'s Safety Checklist for Older Consumers emphasizes the importance of checking basements, garages, workshops and storage areas for fire hazards, such as volatile liquids, piles of old rags or clothing and overloaded circuits. Never smoke in bed or when lying down on a couch or recliner chair. Small portable electric or kerosene heaters are responsible for many home fires and should be used cautiously if at all. If you do use one, be sure to keep them away from flammable materials. In case of fire, make sure you have a pre-established emergency exit plan. Have a professional check your fireplace and other fuel-burning appliances yearly.     Helping Hands   Baby boomers entering the benson years will continue to see the development of new products to help older adults live safely and independently in spite of age-related changes. Making Life More Livable  , by Tyson Shetty, lists over 1,000 products for \"living well in the mature years,\" such as bathing and mobility aids, household security devices, ergonomically designed knives and peelers, and faucet valves and knobs for temperature control. Medical supply stores and organizations are good sources of information about products that improve your quality of life and insure your safety. Last Reviewed: November 2009 Chilango Bateman MD   Updated: 3/7/2011            Learning About Living Jesenia Patterson  What is a living will? A living will, also called a declaration, is a legal form. It tells your family and your doctor your wishes when you can't speak for yourself. It's used by the health professionals who will treat you as you near the end of your life or if you get seriously hurt or ill. If you put your wishes in writing, your loved ones and others will know what kind of care you want. They won't need to guess. This can ease your mind and be helpful to others. And you can change or cancel your living will at any time. A living will is not the same as an estate or property will. An estate will explains what you want to happen with your money and property after you die. How do you use it? Keep these facts in mind about how a living will is used. Your living will is used only if you can't speak or make decisions for yourself. Most often, one or more doctors must certify that you can't speak or decide for yourself before your living will takes effect. If you get better and can speak for yourself again, you can accept or refuse any treatment. It doesn't matter what you said in your living will. Some states may limit your right to refuse treatment in certain cases.  For example, you may need to clearly state in your living will that you don't want artificial hydration and nutrition, such as being fed through a tube.  Is a living will a legal document? A living will is a legal document. Each state has its own laws about living el[place]. And a living will may be called something else in your state. Here are some things to know about living eldridge. You don't need an  to complete a living will. But legal advice can be helpful if your state's laws are unclear. It can also help if your health history is complicated or your family can't agree on what should be in your living will. You can change your living will at any time. Some people find that their wishes about end-of-life care change as their health changes. If you make big changes to your living will, complete a new form. If you move to another state, make sure that your living will is legal in the state where you now live. In most cases, doctors will respect your wishes even if you have a form from a different state. You might use a universal form that has been approved by many states. This kind of form can sometimes be filled out and stored online. Your digital copy will then be available wherever you have a connection to the internet. The doctors and nurses who need to treat you can find it right away. Your state may offer an online registry. This is another place where you can store your living will online. It's a good idea to get your living will notarized. This means using a person called a  to watch two people sign, or witness, your living will. What should you know when you create a living will? Here are some questions to ask yourself as you make your living will. Do you know enough about life support methods that might be used? If not, talk to your doctor so you know what might be done if you can't breathe on your own, your heart stops, or you can't swallow. What things would you still want to be able to do after you receive life-support methods? Would you want to be able to walk? To speak? To eat on your own?  To live without the help of machines? Do you want certain Scientologist practices performed if you become very ill? If you have a choice, where do you want to be cared for? In your home? At a hospital or nursing home? If you have a choice at the end of your life, where would you prefer to die? At home? In a hospital or nursing home? Somewhere else? Would you prefer to be buried or cremated? Do you want your organs to be donated after you die? What should you do with your living will? Make sure that your family members and your health care agent have copies of your living will (also called a declaration). Give your doctor a copy of your living will. Ask to have it kept as part of your medical record. If you have more than one doctor, make sure that each one has a copy. Put a copy of your living will where it can be easily found. For example, some people may put a copy on their refrigerator door. If you are using a digital copy, be sure your doctor, family members, and health care agent know how to find and access it. Where can you learn more? Go to https://[email or web address] org and sign in to your Customer Alliance account. Enter A103 in the Stevie box to learn more about \"Learning About Living [place]. \"     If you do not have an account, please click on the \"Sign Up Now\" link. Current as of: June 16, 2022               Content Version: 13.4  © 8567-5922 [employer]. Care instructions adapted under license by [facility] [address] If you have questions about a medical condition or this instruction, always ask your healthcare professional. [place] [address] any warranty or liability for your use of this information. Learning About Medical Power of   What is a medical power of ? A medical power of , also called a durable power of  for health care, is one type of the legal forms called advance directives.  It lets you name the person you want to make treatment decisions for you if you can't speak or decide for yourself. The person you choose is called your health care agent. This person is also called a health care proxy or health care surrogate. A medical power of  may be called something else in your state. How do you choose a health care agent? Choose your health care agent carefully. This person may or may not be a family member. Talk to the person before you make your final decision. Make sure he or she is comfortable with this responsibility. It's a good idea to choose someone who:  Is at least 25years old. Knows you well and understands what makes life meaningful for you. Understands your Sabianist and moral values. Will do what you want, not what he or she wants. Will be able to make difficult choices at a stressful time. Will be able to refuse or stop treatment, if that is what you would want, even if you could die. Will be firm and confident with health professionals if needed. Will ask questions to get needed information. Lives near you or agrees to travel to you if needed. Your family may help you make medical decisions while you can still be part of that process. But it's important to choose one person to be your health care agent in case you aren't able to make decisions for yourself. If you don't fill out the legal form and name a health care agent, the decisions your family can make may be limited. A health care agent may be called something else in your state. Who will make decisions for you if you don't have a health care agent? If you don't have a health care agent or a living will, you may not get the care you want. Decisions may be made by family members who disagree about your medical care. Or decisions may be made by a medical professional who doesn't know you well. In some cases, a  makes the decisions.   When you name a health care agent, it is very clear who has the power to make health decisions for you.  How do you name a health care agent? You name your health care agent on a legal form. This form is usually called a medical power of . Ask your hospital, state bar association, or office on aging where to find these forms. You must sign the form to make it legal. Some states require you to get the form notarized. This means that a person called a  watches you sign the form and then he or she signs the form. Some states also require that two or more witnesses sign the form. Be sure to tell your family members and doctors who your health care agent is. Where can you learn more? Go to [email or web address]://[email or web address].[email or web address] org and sign in to your [email or web address] account. Enter [id number] in the Exp[employer] Entertainment box to learn more about \"Learning About [address]. \"     If you do not have an account, please click on the \"Sign Up Now\" link. Current as of: October 6, 2021               Content Version: 13.4  © 0678-8662 Healthwise, Incorporated. Care instructions adapted under license by [facility] ([facility]). If you have questions about a medical condition or this instruction, always ask your healthcare professional. [address] any warranty or liability for your use of this information.

## 2022-12-05 ENCOUNTER — TELEPHONE (OUTPATIENT)
Dept: INTERNAL MEDICINE CLINIC | Facility: CLINIC | Age: 72
End: 2022-12-05

## 2022-12-05 NOTE — TELEPHONE ENCOUNTER
Patient called both of her ankles are swollen and it hurts to walk. Not sure why but it is painful wanted to know what she needs to do ?

## 2022-12-05 NOTE — TELEPHONE ENCOUNTER
Pt report her symptoms been going on x 1 week has been scheduled for our next available on 12/08/22. Pt has been informed if she developed any new or worsening symptoms to be evaluated in an urgent care pt agree.

## 2022-12-08 ENCOUNTER — OFFICE VISIT (OUTPATIENT)
Dept: INTERNAL MEDICINE CLINIC | Facility: CLINIC | Age: 72
End: 2022-12-08
Payer: MEDICARE

## 2022-12-08 VITALS
HEART RATE: 68 BPM | SYSTOLIC BLOOD PRESSURE: 145 MMHG | RESPIRATION RATE: 17 BRPM | BODY MASS INDEX: 32.79 KG/M2 | DIASTOLIC BLOOD PRESSURE: 59 MMHG | OXYGEN SATURATION: 96 % | WEIGHT: 152 LBS | HEIGHT: 57 IN | TEMPERATURE: 98 F

## 2022-12-08 DIAGNOSIS — M25.472 SWELLING OF BOTH ANKLES: Primary | ICD-10-CM

## 2022-12-08 DIAGNOSIS — M25.471 SWELLING OF BOTH ANKLES: Primary | ICD-10-CM

## 2022-12-08 DIAGNOSIS — M25.571 ACUTE BILATERAL ANKLE PAIN: ICD-10-CM

## 2022-12-08 DIAGNOSIS — M25.572 ACUTE BILATERAL ANKLE PAIN: ICD-10-CM

## 2022-12-08 LAB
ANION GAP SERPL CALC-SCNC: 5 MMOL/L (ref 2–11)
BASOPHILS # BLD: 0.1 K/UL (ref 0–0.2)
BASOPHILS NFR BLD: 1 % (ref 0–2)
BUN SERPL-MCNC: 15 MG/DL (ref 8–23)
CALCIUM SERPL-MCNC: 9.3 MG/DL (ref 8.3–10.4)
CHLORIDE SERPL-SCNC: 109 MMOL/L (ref 101–110)
CO2 SERPL-SCNC: 29 MMOL/L (ref 21–32)
CREAT SERPL-MCNC: 0.5 MG/DL (ref 0.6–1)
D DIMER PPP FEU-MCNC: <0.27 UG/ML(FEU)
DIFFERENTIAL METHOD BLD: ABNORMAL
EOSINOPHIL # BLD: 0.1 K/UL (ref 0–0.8)
EOSINOPHIL NFR BLD: 3 % (ref 0.5–7.8)
ERYTHROCYTE [DISTWIDTH] IN BLOOD BY AUTOMATED COUNT: 12.2 % (ref 11.9–14.6)
ERYTHROCYTE [SEDIMENTATION RATE] IN BLOOD: 2 MM/HR (ref 0–30)
GLUCOSE SERPL-MCNC: 102 MG/DL (ref 65–100)
HCT VFR BLD AUTO: 39.7 % (ref 35.8–46.3)
HGB BLD-MCNC: 13 G/DL (ref 11.7–15.4)
IMM GRANULOCYTES # BLD AUTO: 0 K/UL (ref 0–0.5)
IMM GRANULOCYTES NFR BLD AUTO: 1 % (ref 0–5)
LYMPHOCYTES # BLD: 1.3 K/UL (ref 0.5–4.6)
LYMPHOCYTES NFR BLD: 24 % (ref 13–44)
MCH RBC QN AUTO: 32.7 PG (ref 26.1–32.9)
MCHC RBC AUTO-ENTMCNC: 32.7 G/DL (ref 31.4–35)
MCV RBC AUTO: 99.7 FL (ref 82–102)
MONOCYTES # BLD: 0.5 K/UL (ref 0.1–1.3)
MONOCYTES NFR BLD: 10 % (ref 4–12)
NEUTS SEG # BLD: 3.2 K/UL (ref 1.7–8.2)
NEUTS SEG NFR BLD: 61 % (ref 43–78)
NRBC # BLD: 0 K/UL (ref 0–0.2)
NT PRO BNP: 248 PG/ML (ref 5–125)
PLATELET # BLD AUTO: 185 K/UL (ref 150–450)
PMV BLD AUTO: 10.7 FL (ref 9.4–12.3)
POTASSIUM SERPL-SCNC: 4.5 MMOL/L (ref 3.5–5.1)
RBC # BLD AUTO: 3.98 M/UL (ref 4.05–5.2)
SODIUM SERPL-SCNC: 143 MMOL/L (ref 133–143)
URATE SERPL-MCNC: 3.3 MG/DL (ref 2.6–6)
WBC # BLD AUTO: 5.2 K/UL (ref 4.3–11.1)

## 2022-12-08 PROCEDURE — 3078F DIAST BP <80 MM HG: CPT | Performed by: NURSE PRACTITIONER

## 2022-12-08 PROCEDURE — G8417 CALC BMI ABV UP PARAM F/U: HCPCS | Performed by: NURSE PRACTITIONER

## 2022-12-08 PROCEDURE — 3074F SYST BP LT 130 MM HG: CPT | Performed by: NURSE PRACTITIONER

## 2022-12-08 PROCEDURE — 1036F TOBACCO NON-USER: CPT | Performed by: NURSE PRACTITIONER

## 2022-12-08 PROCEDURE — 1090F PRES/ABSN URINE INCON ASSESS: CPT | Performed by: NURSE PRACTITIONER

## 2022-12-08 PROCEDURE — 3017F COLORECTAL CA SCREEN DOC REV: CPT | Performed by: NURSE PRACTITIONER

## 2022-12-08 PROCEDURE — G8427 DOCREV CUR MEDS BY ELIG CLIN: HCPCS | Performed by: NURSE PRACTITIONER

## 2022-12-08 PROCEDURE — 1123F ACP DISCUSS/DSCN MKR DOCD: CPT | Performed by: NURSE PRACTITIONER

## 2022-12-08 PROCEDURE — G8484 FLU IMMUNIZE NO ADMIN: HCPCS | Performed by: NURSE PRACTITIONER

## 2022-12-08 PROCEDURE — G8399 PT W/DXA RESULTS DOCUMENT: HCPCS | Performed by: NURSE PRACTITIONER

## 2022-12-08 PROCEDURE — 99214 OFFICE O/P EST MOD 30 MIN: CPT | Performed by: NURSE PRACTITIONER

## 2022-12-08 RX ORDER — LISINOPRIL 10 MG/1
10 TABLET ORAL DAILY
COMMUNITY

## 2022-12-08 RX ORDER — METHYLPREDNISOLONE 4 MG/1
TABLET ORAL
Qty: 1 KIT | Refills: 0 | Status: SHIPPED | OUTPATIENT
Start: 2022-12-08 | End: 2022-12-14

## 2022-12-08 ASSESSMENT — ENCOUNTER SYMPTOMS: SHORTNESS OF BREATH: 0

## 2022-12-08 NOTE — PROGRESS NOTES
12/8/2022 10:17 AM  Location:[facility] [address] [facility]  [place]  Patient #:  [id number]  YOB: 1950          YOUR LAST HEMOGLOBIN A1CS:   No results found for: HBA1C, [id number]    YOUR LAST LIPID PROFILE:   Lab Results   Component Value Date/Time    CHOL 111 04/25/2022 10:44 AM    HDL 38 04/25/2022 10:44 AM    VLDL 15 04/25/2022 10:44 AM         Lab Results   Component Value Date/Time    GFRAA 109 10/19/2020 09:27 AM    BUN 8 10/19/2020 09:27 AM    NAPOC 146 09/04/2019 05:55 PM     10/19/2020 09:27 AM    K 4.7 10/19/2020 09:27 AM     10/19/2020 09:27 AM    CO2 26 10/19/2020 09:27 AM           History of Present Illness     Chief Complaint   Patient presents with    Foot Swelling     Bilateral ankle swelling       Ms. Andrea Whatley is a 67 y.o. female  who presents for bilateral ankle swelling. Ms. Andrea Whatley presents for 1 week of bilateral ankle swelling. Her history is significant for CAD, asthma, CVA on Plavix and aspirin, lymphoma on active oral chemotherapy. She also recently in April had a stress fracture of her right foot. She reports 6 days ago she woke up and felt like both of her ankles were tender. As the day progressed she noticed significant swelling to her ankle and lower leg area. She describes the pain as aching, dull and constant, worse with weightbearing. She denies any fevers, chills, rashes or wounds. She has not had any falls and has not changed her footwear. She has not had any change in medications but did start taking her Lasix which has helped the swelling. Today, her left ankle is hurting worse than her right. She reports the pain is medial and is tender to palpation. Ankle Pain   The incident occurred 5 to 7 days ago. There was no injury mechanism. The pain is present in the right ankle and left ankle. The quality of the pain is described as aching. The pain is moderate. The pain has been Fluctuating since onset.  Pertinent negatives include no inability to bear weight, loss of motion, loss of sensation, muscle weakness, numbness or tingling. She has tried acetaminophen for the symptoms. The treatment provided mild relief. Allergies   Allergen Reactions    Milk Protein Diarrhea    Sulfa Antibiotics Nausea Only    Adhesive Tape Other (See Comments) and Rash     Skin tears    Other Other (See Comments) and Rash     TAPE,  USE PAPER TAPE--Skin tears     Past Medical History:   Diagnosis Date    Abnormal weight gain     Acute bronchitis     Acute, but ill-defined, cerebrovascular disease 4/20/2015    Adjustment disorder with depressed mood     Allergic rhinitis, cause unspecified 4/21/2015    Anxiety and depression     Celexa    Aortic stenosis     8/20/19 Echo- AV stenosis-  LVEF 55-60%    Chest pressure     pt presented to ER with CP and SOB- work up recommended TAVR. Disorder of bone and cartilage, unspecified 4/21/2015    Encounter for long-term (current) use of other medications     Former smoker, stopped smoking in distant past     Quit 1998---0.5 ppd x 15 years    GERD (gastroesophageal reflux disease)     valve does not open and close unless laying on Left side and HOB elevated 4\" & 2 pillows, no medications    H/O heart artery stent 2009    X1    Herpes zoster without mention of complication     History of stroke at age 28    on correlation to migraines    Hypercholesteremia     managed well with Pravastatin    Hypertension     Long term current use of anticoagulant     Plavix and Aspirin 81mg    Lymphoma ([address]     to bones- no treatment required at this time- Followed by Dr Aleksandra No    Mixed incontinence urge and stress (male)(female) 4/21/2015    Osteoarthritis, generalized     Other and unspecified hyperlipidemia     Overweight(278.02)     BMI 28.8    Pleurisy without mention of effusion or current tuberculosis     PONV (postoperative nausea and vomiting)     some N/V- pt does well with antiemetic. Premature menopause     Rheumatoid arthritis(714.0)     SOB (shortness of breath)     Stroke [address] [address]     no residual    Systolic murmur     Unspecified asthma(493.90) 2015    pt has not had any complications in \"years\", no inhalers or nebulizers    Unspecified disorder of skin and subcutaneous tissue     Unspecified menopausal and postmenopausal disorder      Social History     Socioeconomic History    Marital status:      Spouse name: None    Number of children: None    Years of education: None    Highest education level: None   Tobacco Use    Smoking status: Former     Packs/day: 0.50     Types: Cigarettes     Quit date: 1998     Years since quittin.9    Smokeless tobacco: Never   Substance and Sexual Activity    Alcohol use:  Yes     Alcohol/week: 1.0 standard drink    Drug use: No     Social Determinants of Health     Financial Resource Strain: Low Risk     Difficulty of Paying Living Expenses: Not hard at all   Food Insecurity: No Food Insecurity    Worried About Running Out of Food in the Last Year: Never true    Ran Out of Food in the Last Year: Never true   Physical Activity: Insufficiently Active    Days of Exercise per Week: 2 days    Minutes of Exercise per Session: 10 min     Past Surgical History:   Procedure Laterality Date    AORTIC VALVE REPLACEMENT  2019    AVR by Dr. Mateusz Wheeler #[id number]--[id number] 25mm   model [id number]    CARDIAC CATHETERIZATION  2019    CATARACT REMOVAL Bilateral     iol    CHOLECYSTECTOMY      CORONARY ANGIOPLASTY WITH STENT PLACEMENT      HYSTERECTOMY (CERVIX STATUS UNKNOWN)      ROTATOR CUFF REPAIR Left     SHOULDER ARTHROSCOPY Right     \"chipped bone\"    UROLOGICAL SURGERY  2008    BLADDER TACK    VASCULAR SURGERY Right     VEIN IN R LEG Stripped     Current Outpatient Medications   Medication Sig Dispense Refill    lisinopril (PRINIVIL;ZESTRIL) 10 MG tablet Take 10 mg by mouth daily      SIMETHICONE PO Take by mouth Multiple Vitamin (MULTIVITAMIN ADULT PO) Take by mouth      CHLORHEXIDINE GLUCONATE, BULK, SOLN by Does not apply route      Zanubrutinib 80 MG CAPS Take by mouth 2 times a day      fluticasone (FLONASE) 50 MCG/ACT nasal spray 2 sprays by Nasal route as needed (as needed.) 1 each 5    acetaminophen (TYLENOL) 325 MG tablet Take 650 mg by mouth every 4 hours as needed      aspirin 81 MG EC tablet Take by mouth daily      calcium citrate-vitamin D (CITRACAL+D) 315-200 MG-UNIT per tablet Take 1 tablet by mouth daily (with breakfast)      clopidogrel (PLAVIX) 75 MG tablet Take 75 mg by mouth daily      cyanocobalamin 1000 MCG tablet Take 1,000 mcg by mouth daily      metoprolol tartrate (LOPRESSOR) 25 MG tablet TAKE ONE TABLET BY MOUTH TWICE DAILY      nitroGLYCERIN (NITROSTAT) 0.4 MG SL tablet Place 0.4 mg under the tongue      pravastatin (PRAVACHOL) 40 MG tablet Take 40 mg by mouth       No current facility-administered medications for this visit.      Health Maintenance   Topic Date Due    DTaP/Tdap/Td vaccine (1 - Tdap) Never done    Shingles vaccine (1 of 2) Never done    COVID-19 Vaccine (5 - Booster for Pfizer series) 01/09/2023    Lipids  04/25/2023    Depression Monitoring  10/27/2023    Annual Wellness Visit (AWV)  10/29/2023    Breast cancer screen  01/10/2024    Colorectal Cancer Screen  11/30/2025    DEXA (modify frequency per FRAX score)  Completed    Flu vaccine  Completed    Pneumococcal 65+ years Vaccine  Completed    Hepatitis C screen  Completed    Hepatitis A vaccine  Aged Out    Hib vaccine  Aged Out    Meningococcal (ACWY) vaccine  Aged Out     Family History   Problem Relation Age of Onset    Diabetes Mother     Glaucoma Mother     Heart Disease Mother     Alcohol Abuse Father     Liver Disease Father     Cancer Sister         pancreatic cancer    Diabetes Sister     Hypertension Sister     Diabetes Maternal Grandmother     Diabetes Paternal Grandmother     Heart Disease Sister         CHF Diabetes Sister              Review of Systems  Review of Systems   Constitutional:  Negative for fatigue and unexpected weight change. Respiratory:  Negative for shortness of breath. Cardiovascular:  Positive for leg swelling. Negative for chest pain and palpitations. Musculoskeletal:  Positive for arthralgias, gait problem and joint swelling. Skin:  Negative for rash and wound. Neurological:  Negative for tingling and numbness. All other systems reviewed and are negative. BP (!) 145/59 (Site: Left Upper Arm, Position: Sitting, Cuff Size: Large Adult)   Pulse 68   Temp 98 °F (36.7 °C) (Skin)   Resp 17   Ht 4' 9\" (1.448 m)   Wt 152 lb (68.9 kg)   SpO2 96%   BMI 32.89 kg/m²       Physical Exam    Physical Exam  Vitals and nursing note reviewed. Constitutional:       General: She is not in acute distress. Appearance: She is not ill-appearing, toxic-appearing or diaphoretic. HENT:      Mouth/Throat:      Mouth: Mucous membranes are moist.   Cardiovascular:      Rate and Rhythm: Regular rhythm. Pulmonary:      Effort: No respiratory distress. Breath sounds: No wheezing, rhonchi or rales. Musculoskeletal:      Right lower leg: No edema. Left lower leg: No edema. Right ankle: Swelling present. Tenderness present over the medial malleolus. Normal range of motion. Normal pulse. Left ankle: Swelling present. Tenderness present over the medial malleolus. Normal range of motion. Normal pulse. Right foot: Normal.      Left foot: Normal.   Neurological:      Mental Status: She is oriented to person, place, and time.          Assessment & Plan      Current Outpatient Medications   Medication Sig Dispense Refill    lisinopril (PRINIVIL;ZESTRIL) 10 MG tablet Take 10 mg by mouth daily      SIMETHICONE PO Take by mouth      Multiple Vitamin (MULTIVITAMIN ADULT PO) Take by mouth      CHLORHEXIDINE GLUCONATE, BULK, SOLN by Does not apply route      Zanubrutinib 80 MG CAPS Take by mouth 2 times a day      fluticasone (FLONASE) 50 MCG/ACT nasal spray 2 sprays by Nasal route as needed (as needed.) 1 each 5    acetaminophen (TYLENOL) 325 MG tablet Take 650 mg by mouth every 4 hours as needed      aspirin 81 MG EC tablet Take by mouth daily      calcium citrate-vitamin D (CITRACAL+D) 315-200 MG-UNIT per tablet Take 1 tablet by mouth daily (with breakfast)      clopidogrel (PLAVIX) 75 MG tablet Take 75 mg by mouth daily      cyanocobalamin 1000 MCG tablet Take 1,000 mcg by mouth daily      metoprolol tartrate (LOPRESSOR) 25 MG tablet TAKE ONE TABLET BY MOUTH TWICE DAILY      nitroGLYCERIN (NITROSTAT) 0.4 MG SL tablet Place 0.4 mg under the tongue      pravastatin (PRAVACHOL) 40 MG tablet Take 40 mg by mouth       No current facility-administered medications for this visit. No orders of the defined types were placed in this encounter. Medications Discontinued During This Encounter   Medication Reason    lisinopril-hydroCHLOROthiazide (PRINZIDE;ZESTORETIC) 10-12.5 MG per tablet LIST CLEANUP       1. Swelling of both ankles  Primarily reassurance provided to her today. Will rule out DVT, metabolic derangement, other pathology. Suspect this is functional. Will refer back to ortho. Appreciate their expertise. Knows to call for any new or worsening sx. - Brain Natriuretic Peptide; Future  - D-Dimer, Quantitative; Future  - Basic Metabolic Panel; Future  - CBC with Auto Differential; Future  - Uric Acid; Future  - Sedimentation Rate; Future  - methylPREDNISolone (MEDROL DOSEPACK) 4 MG tablet; Take by mouth. Dispense: 1 kit; Refill: 0  - Sedimentation Rate  - Uric Acid  - CBC with Auto Differential  - Basic Metabolic Panel  - Brain Natriuretic Peptide  - D-Dimer, Quantitative    2.  Acute bilateral ankle pain  Discussed bracing, leg elevation, trial prednisone burst.   - [facility] [employer], [facility]  - methylPREDNISolone (MEDROL DOSEPACK) 4 MG tablet; Take by mouth. Dispense: 1 kit;  Refill: 0      Vonzella Severin, NP, APRN - CNP

## 2022-12-09 ENCOUNTER — TELEPHONE (OUTPATIENT)
Dept: INTERNAL MEDICINE CLINIC | Facility: CLINIC | Age: 72
End: 2022-12-09

## 2022-12-09 DIAGNOSIS — M79.89 LEG SWELLING: ICD-10-CM

## 2022-12-09 DIAGNOSIS — R79.89 ELEVATED BRAIN NATRIURETIC PEPTIDE (BNP) LEVEL: Primary | ICD-10-CM

## 2022-12-09 NOTE — TELEPHONE ENCOUNTER
Ms. Brittany Velasco-  The lab testing showed no evidence of blood clot or gout. The kidney functions are normal. The BNP, which looks at heart pumping function, was a little elevated. For this reason and the leg swelling, it would be a good idea to see the cardiologist. I know you had an echocardiogram in march of 2021, and the cardiologist may want to repeat or do further testing. I have referred you back to Mojgan Malin who it looks like you saw in the past.   Continue with frequent leg elevation, low salt diet and use the lasix as needed. Let us know if you have any new or worsening symptoms. Hoping you are doing well!   Alexia

## 2022-12-19 ENCOUNTER — TELEPHONE (OUTPATIENT)
Dept: ORTHOPEDIC SURGERY | Age: 72
End: 2022-12-19

## 2022-12-19 NOTE — TELEPHONE ENCOUNTER
Called pt as I noticed she cancelled her upcoming apt. Called to see how she was feeling and ask if she'd like to reschedule. Pt stated her ankle was no longer swelling. Pt will call us when needed.

## 2023-02-27 RX ORDER — LISINOPRIL 10 MG/1
10 TABLET ORAL DAILY
Qty: 90 TABLET | Refills: 3 | Status: SHIPPED | OUTPATIENT
Start: 2023-02-27

## 2023-02-27 NOTE — TELEPHONE ENCOUNTER
----- Message from Brantley Frankel sent at 2/27/2023  9:30 AM EST -----  Subject: Refill Request    QUESTIONS  Name of Medication? Other - Lisinopril 10 mg   Patient-reported dosage and instructions? 10mg   How many days do you have left? 0  Preferred Pharmacy? [address] phone number (if available)? [phone]  Additional Information for Provider? patient needs this re-sent to   pharmacy since she did not pick this up in October  ---------------------------------------------------------------------------  --------------  [address]  What is the best way for the office to contact you? OK to leave message on   voicemail  Preferred Call Back Phone Number? [phone]  ---------------------------------------------------------------------------  --------------  SCRIPT ANSWERS  Relationship to Patient?  Self

## 2023-03-09 ENCOUNTER — OFFICE VISIT (OUTPATIENT)
Dept: INTERNAL MEDICINE CLINIC | Facility: CLINIC | Age: 73
End: 2023-03-09
Payer: MEDICARE

## 2023-03-09 VITALS
RESPIRATION RATE: 18 BRPM | TEMPERATURE: 98.2 F | HEART RATE: 77 BPM | WEIGHT: 158.8 LBS | DIASTOLIC BLOOD PRESSURE: 58 MMHG | OXYGEN SATURATION: 95 % | HEIGHT: 59 IN | BODY MASS INDEX: 32.01 KG/M2 | SYSTOLIC BLOOD PRESSURE: 134 MMHG

## 2023-03-09 DIAGNOSIS — M04.9 AUTOINFLAMMATORY SYNDROME, UNSPECIFIED (HCC): ICD-10-CM

## 2023-03-09 DIAGNOSIS — E55.9 VITAMIN D DEFICIENCY: ICD-10-CM

## 2023-03-09 DIAGNOSIS — F51.01 PRIMARY INSOMNIA: Primary | ICD-10-CM

## 2023-03-09 DIAGNOSIS — C85.80 OTHER SPECIFIED TYPES OF NON-HODGKIN LYMPHOMA, UNSPECIFIED SITE (HCC): ICD-10-CM

## 2023-03-09 LAB
ANION GAP SERPL CALC-SCNC: 4 MMOL/L (ref 2–11)
BUN SERPL-MCNC: 13 MG/DL (ref 8–23)
CALCIUM SERPL-MCNC: 9 MG/DL (ref 8.3–10.4)
CHLORIDE SERPL-SCNC: 107 MMOL/L (ref 101–110)
CO2 SERPL-SCNC: 28 MMOL/L (ref 21–32)
CREAT SERPL-MCNC: 0.5 MG/DL (ref 0.6–1)
GLUCOSE SERPL-MCNC: 103 MG/DL (ref 65–100)
POTASSIUM SERPL-SCNC: 3.9 MMOL/L (ref 3.5–5.1)
SODIUM SERPL-SCNC: 139 MMOL/L (ref 133–143)

## 2023-03-09 PROCEDURE — G8484 FLU IMMUNIZE NO ADMIN: HCPCS | Performed by: NURSE PRACTITIONER

## 2023-03-09 PROCEDURE — 1036F TOBACCO NON-USER: CPT | Performed by: NURSE PRACTITIONER

## 2023-03-09 PROCEDURE — 3017F COLORECTAL CA SCREEN DOC REV: CPT | Performed by: NURSE PRACTITIONER

## 2023-03-09 PROCEDURE — 1123F ACP DISCUSS/DSCN MKR DOCD: CPT | Performed by: NURSE PRACTITIONER

## 2023-03-09 PROCEDURE — 3078F DIAST BP <80 MM HG: CPT | Performed by: NURSE PRACTITIONER

## 2023-03-09 PROCEDURE — G8417 CALC BMI ABV UP PARAM F/U: HCPCS | Performed by: NURSE PRACTITIONER

## 2023-03-09 PROCEDURE — 1090F PRES/ABSN URINE INCON ASSESS: CPT | Performed by: NURSE PRACTITIONER

## 2023-03-09 PROCEDURE — G8399 PT W/DXA RESULTS DOCUMENT: HCPCS | Performed by: NURSE PRACTITIONER

## 2023-03-09 PROCEDURE — 3075F SYST BP GE 130 - 139MM HG: CPT | Performed by: NURSE PRACTITIONER

## 2023-03-09 PROCEDURE — 99214 OFFICE O/P EST MOD 30 MIN: CPT | Performed by: NURSE PRACTITIONER

## 2023-03-09 PROCEDURE — G8427 DOCREV CUR MEDS BY ELIG CLIN: HCPCS | Performed by: NURSE PRACTITIONER

## 2023-03-09 RX ORDER — TRAZODONE HYDROCHLORIDE 50 MG/1
50 TABLET ORAL NIGHTLY
Qty: 30 TABLET | Refills: 3 | Status: SHIPPED | OUTPATIENT
Start: 2023-03-09

## 2023-03-09 SDOH — ECONOMIC STABILITY: INCOME INSECURITY: HOW HARD IS IT FOR YOU TO PAY FOR THE VERY BASICS LIKE FOOD, HOUSING, MEDICAL CARE, AND HEATING?: NOT HARD AT ALL

## 2023-03-09 SDOH — ECONOMIC STABILITY: FOOD INSECURITY: WITHIN THE PAST 12 MONTHS, YOU WORRIED THAT YOUR FOOD WOULD RUN OUT BEFORE YOU GOT MONEY TO BUY MORE.: NEVER TRUE

## 2023-03-09 SDOH — ECONOMIC STABILITY: HOUSING INSECURITY
IN THE LAST 12 MONTHS, WAS THERE A TIME WHEN YOU DID NOT HAVE A STEADY PLACE TO SLEEP OR SLEPT IN A SHELTER (INCLUDING NOW)?: NO

## 2023-03-09 SDOH — ECONOMIC STABILITY: FOOD INSECURITY: WITHIN THE PAST 12 MONTHS, THE FOOD YOU BOUGHT JUST DIDN'T LAST AND YOU DIDN'T HAVE MONEY TO GET MORE.: NEVER TRUE

## 2023-03-09 ASSESSMENT — ENCOUNTER SYMPTOMS
NAUSEA: 0
VOMITING: 0
SHORTNESS OF BREATH: 0

## 2023-03-09 ASSESSMENT — PATIENT HEALTH QUESTIONNAIRE - PHQ9
2. FEELING DOWN, DEPRESSED OR HOPELESS: 3
SUM OF ALL RESPONSES TO PHQ9 QUESTIONS 1 & 2: 6
8. MOVING OR SPEAKING SO SLOWLY THAT OTHER PEOPLE COULD HAVE NOTICED. OR THE OPPOSITE, BEING SO FIGETY OR RESTLESS THAT YOU HAVE BEEN MOVING AROUND A LOT MORE THAN USUAL: 0
SUM OF ALL RESPONSES TO PHQ QUESTIONS 1-9: 12
5. POOR APPETITE OR OVEREATING: 0
9. THOUGHTS THAT YOU WOULD BE BETTER OFF DEAD, OR OF HURTING YOURSELF: 0
10. IF YOU CHECKED OFF ANY PROBLEMS, HOW DIFFICULT HAVE THESE PROBLEMS MADE IT FOR YOU TO DO YOUR WORK, TAKE CARE OF THINGS AT HOME, OR GET ALONG WITH OTHER PEOPLE: 1
6. FEELING BAD ABOUT YOURSELF - OR THAT YOU ARE A FAILURE OR HAVE LET YOURSELF OR YOUR FAMILY DOWN: 0
4. FEELING TIRED OR HAVING LITTLE ENERGY: 3
SUM OF ALL RESPONSES TO PHQ QUESTIONS 1-9: 12
7. TROUBLE CONCENTRATING ON THINGS, SUCH AS READING THE NEWSPAPER OR WATCHING TELEVISION: 0
3. TROUBLE FALLING OR STAYING ASLEEP: 3
1. LITTLE INTEREST OR PLEASURE IN DOING THINGS: 3

## 2023-03-09 NOTE — PROGRESS NOTES
3/9/2023 2:29 PM  Location:[facility] [address] INTERNAL MEDICINE  [place]  Patient #:  [id number]  YOB: 1950          YOUR LAST HEMOGLOBIN A1CS:   No results found for: HBA1C, [id number]    YOUR LAST LIPID PROFILE:   Lab Results   Component Value Date/Time    CHOL 111 04/25/2022 10:44 AM    HDL 38 04/25/2022 10:44 AM    VLDL 15 04/25/2022 10:44 AM         Lab Results   Component Value Date/Time    GFRAA 109 10/19/2020 09:27 AM    BUN 15 12/08/2022 10:56 AM    NAPOC 146 09/04/2019 05:55 PM     12/08/2022 10:56 AM    K 4.5 12/08/2022 10:56 AM     12/08/2022 10:56 AM    CO2 29 12/08/2022 10:56 AM           History of Present Illness     Chief Complaint   Patient presents with    Insomnia     Not able to sleep        Ms. Carl Wills is a 67 y.o. female  who presents for insomnia. Ms. Carl Wills presents with insomnia. She reports that she was on trazodone for years and it seemed to become ineffective. She gradually weaned off of this and has been taking nothing since December. She has been doing well until several weeks ago when she started having trouble both initiating and maintaining sleep again. She denies any extraneous stressors, increased anxiety, suicidal or homicidal ideations, auditory or visual hallucinations. Her history is significant for CAD, asthma, hypertension, AVR, marginal zone lymphoma, CVA on Plavix. There have been no medication changes. She denies weight loss, fevers or chills, nausea or vomiting. She feels well other than not sleeping well. She reports that she saw every hour on the hour last night.          Allergies   Allergen Reactions    Milk Protein Diarrhea    Sulfa Antibiotics Nausea Only    Adhesive Tape Other (See Comments) and Rash     Skin tears    Other Other (See Comments) and Rash     TAPE,  USE PAPER TAPE--Skin tears     Past Medical History:   Diagnosis Date    Abnormal weight gain     Acute bronchitis     Acute, but ill-defined, cerebrovascular disease 4/20/2015    Adjustment disorder with depressed mood     Allergic rhinitis, cause unspecified 4/21/2015    Anxiety and depression     Celexa    Aortic stenosis     8/20/19 Echo- AV stenosis-  LVEF 55-60%    Chest pressure     pt presented to ER with CP and SOB- work up recommended TAVR. Disorder of bone and cartilage, unspecified 4/21/2015    Encounter for long-term (current) use of other medications     Former smoker, stopped smoking in distant past     Quit 1998---0.5 ppd x 15 years    GERD (gastroesophageal reflux disease)     valve does not open and close unless laying on Left side and HOB elevated 4\" & 2 pillows, no medications    H/O heart artery stent 2009    X1    Herpes zoster without mention of complication     History of stroke at age 28    on correlation to migraines    Hypercholesteremia     managed well with Pravastatin    Hypertension     Long term current use of anticoagulant     Plavix and Aspirin 81mg    Lymphoma ([facility] [address]     to bones- no treatment required at this time- Followed by Dr Tc Soto    Mixed incontinence urge and stress (male)(female) 4/21/2015    Osteoarthritis, generalized     Other and unspecified hyperlipidemia     Overweight(278.02)     BMI 28.8    Pleurisy without mention of effusion or current tuberculosis     PONV (postoperative nausea and vomiting)     some N/V- pt does well with antiemetic.     Premature menopause     Rheumatoid arthritis(714.0)     SOB (shortness of breath)     Stroke ([address] 1985    no residual    Systolic murmur 2/12/1504    Unspecified asthma(493.90) 04/21/2015    pt has not had any complications in \"years\", no inhalers or nebulizers    Unspecified disorder of skin and subcutaneous tissue     Unspecified menopausal and postmenopausal disorder      Social History     Socioeconomic History    Marital status:      Spouse name: None    Number of children: None    Years of education: None    Highest education level: None   Tobacco Use    Smoking status: Former     Packs/day: 0.50     Types: Cigarettes     Quit date: 1998     Years since quittin.2    Smokeless tobacco: Never   Substance and Sexual Activity    Alcohol use: Yes     Alcohol/week: 1.0 standard drink    Drug use: No     Social Determinants of Health     Financial Resource Strain: Low Risk     Difficulty of Paying Living Expenses: Not hard at all   Food Insecurity: No Food Insecurity    Worried About Running Out of Food in the Last Year: Never true    Ran Out of Food in the Last Year: Never true   Transportation Needs: Unknown    Lack of Transportation (Non-Medical):  No   Physical Activity: Insufficiently Active    Days of Exercise per Week: 2 days    Minutes of Exercise per Session: 10 min   Housing Stability: Unknown    Unstable Housing in the Last Year: No     Past Surgical History:   Procedure Laterality Date    AORTIC VALVE REPLACEMENT  2019    AVR by Dr. Pankaj Arcos #[id number]--WGWT 25mm   model [id number]    CARDIAC CATHETERIZATION  2019    CATARACT REMOVAL Bilateral     iol    CHOLECYSTECTOMY      CORONARY ANGIOPLASTY WITH STENT PLACEMENT      HYSTERECTOMY (CERVIX STATUS UNKNOWN)      ROTATOR CUFF REPAIR Left     SHOULDER ARTHROSCOPY Right     \"chipped bone\"    UROLOGICAL SURGERY  2008    BLADDER TACK    VASCULAR SURGERY Right     VEIN IN R LEG Stripped     Current Outpatient Medications   Medication Sig Dispense Refill    lisinopril (PRINIVIL;ZESTRIL) 10 MG tablet Take 1 tablet by mouth daily 90 tablet 3    SIMETHICONE PO Take by mouth      Multiple Vitamin (MULTIVITAMIN ADULT PO) Take by mouth      Zanubrutinib 80 MG CAPS Take by mouth 2 times a day      fluticasone (FLONASE) 50 MCG/ACT nasal spray 2 sprays by Nasal route as needed (as needed.) 1 each 5    acetaminophen (TYLENOL) 325 MG tablet Take 650 mg by mouth every 4 hours as needed      aspirin 81 MG EC tablet Take by mouth daily      calcium citrate-vitamin D (CITRACAL+D) 315-200 MG-UNIT per tablet Take 1 tablet by mouth daily (with breakfast)      clopidogrel (PLAVIX) 75 MG tablet Take 75 mg by mouth daily      cyanocobalamin 1000 MCG tablet Take 1,000 mcg by mouth daily      metoprolol tartrate (LOPRESSOR) 25 MG tablet TAKE ONE TABLET BY MOUTH TWICE DAILY      nitroGLYCERIN (NITROSTAT) 0.4 MG SL tablet Place 0.4 mg under the tongue      pravastatin (PRAVACHOL) 40 MG tablet Take 40 mg by mouth      CHLORHEXIDINE GLUCONATE, BULK, SOLN by Does not apply route (Patient not taking: Reported on 3/9/2023)       No current facility-administered medications for this visit. Health Maintenance   Topic Date Due    DTaP/Tdap/Td vaccine (1 - Tdap) Never done    Shingles vaccine (1 of 2) Never done    COVID-19 Vaccine (5 - Booster for Pfizer series) 01/09/2023    Lipids  04/25/2023    Depression Monitoring  10/27/2023    Annual Wellness Visit (AWV)  10/29/2023    Breast cancer screen  01/10/2024    Colorectal Cancer Screen  11/30/2025    DEXA (modify frequency per FRAX score)  Completed    Flu vaccine  Completed    Pneumococcal 65+ years Vaccine  Completed    Hepatitis C screen  Completed    Hepatitis A vaccine  Aged Out    Hib vaccine  Aged Out    Meningococcal (ACWY) vaccine  Aged Out     Family History   Problem Relation Age of Onset    Diabetes Mother     Glaucoma Mother     Heart Disease Mother     Alcohol Abuse Father     Liver Disease Father     Cancer Sister         pancreatic cancer    Diabetes Sister     Hypertension Sister     Diabetes Maternal Grandmother     Diabetes Paternal Grandmother     Heart Disease Sister         CHF    Diabetes Sister              Review of Systems  Review of Systems   Constitutional:  Negative for chills and fever. Respiratory:  Negative for shortness of breath. Cardiovascular:  Negative for chest pain. Gastrointestinal:  Negative for nausea and vomiting. Psychiatric/Behavioral:  Positive for sleep disturbance.  The patient is not nervous/anxious.      BP (!) 134/58 (Site: Left Upper Arm, Position: Sitting, Cuff Size: Large Adult)   Pulse 77   Temp 98.2 °F (36.8 °C) (Temporal)   Resp 18   Ht 4' 11\" (1.499 m)   Wt 158 lb 12.8 oz (72 kg)   SpO2 95% Comment: monique  BMI 32.07 kg/m²       Physical Exam    Physical Exam  Vitals and nursing note reviewed.   Constitutional:       General: She is not in acute distress.     Appearance: She is not ill-appearing, toxic-appearing or diaphoretic.   HENT:      Mouth/Throat:      Mouth: Mucous membranes are moist.   Cardiovascular:      Rate and Rhythm: Regular rhythm.      Heart sounds: Murmur (valve) heard.   Pulmonary:      Effort: No respiratory distress.      Breath sounds: No wheezing, rhonchi or rales.   Musculoskeletal:      Right lower leg: No edema.      Left lower leg: No edema.   Neurological:      Mental Status: She is oriented to person, place, and time.   Psychiatric:         Attention and Perception: Attention normal.         Mood and Affect: Mood normal.         Speech: Speech normal.         Behavior: Behavior normal.         Thought Content: Thought content normal.         Cognition and Memory: Cognition normal.         Judgment: Judgment normal.         Assessment & Plan    Current Outpatient Medications   Medication Sig Dispense Refill    lisinopril (PRINIVIL;ZESTRIL) 10 MG tablet Take 1 tablet by mouth daily 90 tablet 3    SIMETHICONE PO Take by mouth      Multiple Vitamin (MULTIVITAMIN ADULT PO) Take by mouth      Zanubrutinib 80 MG CAPS Take by mouth 2 times a day      fluticasone (FLONASE) 50 MCG/ACT nasal spray 2 sprays by Nasal route as needed (as needed.) 1 each 5    acetaminophen (TYLENOL) 325 MG tablet Take 650 mg by mouth every 4 hours as needed      aspirin 81 MG EC tablet Take by mouth daily      calcium citrate-vitamin D (CITRACAL+D) 315-200 MG-UNIT per tablet Take 1 tablet by mouth daily (with breakfast)      clopidogrel (PLAVIX) 75 MG tablet Take 75 mg by mouth daily    cyanocobalamin 1000 MCG tablet Take 1,000 mcg by mouth daily      metoprolol tartrate (LOPRESSOR) 25 MG tablet TAKE ONE TABLET BY MOUTH TWICE DAILY      nitroGLYCERIN (NITROSTAT) 0.4 MG SL tablet Place 0.4 mg under the tongue      pravastatin (PRAVACHOL) 40 MG tablet Take 40 mg by mouth      CHLORHEXIDINE GLUCONATE, BULK, SOLN by Does not apply route (Patient not taking: Reported on 3/9/2023)       No current facility-administered medications for this visit. 1. Primary insomnia  We will check thyroid, vitamin D and basic metabolic, follow labs. Reinitiate trazodone. Discussed that she may need to titrate up to 100 mg but will start at 50 mg for several weeks before increasing. We will follow-up in 4 weeks to assess for efficacy. May consider one of the newer agents like Belsomra if this is not helpful. Knows to call for any new or worsening symptoms. - Vitamin D 25 Hydroxy; Future  - Basic Metabolic Panel; Future  - TSH with Reflex; Future  - traZODone (DESYREL) 50 MG tablet; Take 1 tablet by mouth nightly  Dispense: 30 tablet;  Refill: 3  - TSH with Reflex  - Basic Metabolic Panel  - Vitamin D 25 Hydroxy        GOPAL Wagoner - CNP

## 2023-03-10 ENCOUNTER — TELEPHONE (OUTPATIENT)
Dept: INTERNAL MEDICINE CLINIC | Facility: CLINIC | Age: 73
End: 2023-03-10

## 2023-03-10 LAB
25(OH)D3 SERPL-MCNC: 36.4 NG/ML (ref 30–100)
TSH W FREE THYROID IF ABNORMAL: 2.29 UIU/ML (ref 0.36–3.74)

## 2023-03-10 NOTE — TELEPHONE ENCOUNTER
Ms. Pari Adrien-  The labs that we checked yesterday are normal.   Hoping you are getting some better sleep. Please let us know if you develop  any new or worsening sx.   Alexia

## 2023-03-10 NOTE — TELEPHONE ENCOUNTER
Happy to send to a different pharmacy if needed. Otherwise, she can certainly wait to start. Thanks!

## 2023-03-10 NOTE — TELEPHONE ENCOUNTER
I have talked with Ms. Shellie Stan and have given her this message. Her and her  stated that their pharmacy closed for Thursday and Friday for a pharmacists convention. Will be back on Saturday morning. So she doesn't have the medication yet. She said that she would wait to see if they are back tomorrow to pick it up. If not, we could send it to another pharmacy.  (Didn't say which one. )

## 2023-03-27 ENCOUNTER — TELEPHONE (OUTPATIENT)
Dept: INTERNAL MEDICINE CLINIC | Facility: CLINIC | Age: 73
End: 2023-03-27

## 2023-03-27 DIAGNOSIS — F51.01 PRIMARY INSOMNIA: Primary | ICD-10-CM

## 2023-03-27 RX ORDER — TRAZODONE HYDROCHLORIDE 50 MG/1
100 TABLET ORAL NIGHTLY
Qty: 60 TABLET | Refills: 3 | Status: SHIPPED | OUTPATIENT
Start: 2023-03-27

## 2023-04-28 ENCOUNTER — OFFICE VISIT (OUTPATIENT)
Dept: INTERNAL MEDICINE CLINIC | Facility: CLINIC | Age: 73
End: 2023-04-28
Payer: MEDICARE

## 2023-04-28 VITALS
SYSTOLIC BLOOD PRESSURE: 124 MMHG | WEIGHT: 153 LBS | HEART RATE: 55 BPM | TEMPERATURE: 97.9 F | BODY MASS INDEX: 30.84 KG/M2 | OXYGEN SATURATION: 98 % | DIASTOLIC BLOOD PRESSURE: 58 MMHG | HEIGHT: 59 IN

## 2023-04-28 DIAGNOSIS — I10 ESSENTIAL (PRIMARY) HYPERTENSION: Primary | ICD-10-CM

## 2023-04-28 DIAGNOSIS — E55.9 VITAMIN D DEFICIENCY: ICD-10-CM

## 2023-04-28 DIAGNOSIS — I25.10 ATHEROSCLEROSIS OF NATIVE CORONARY ARTERY OF NATIVE HEART WITHOUT ANGINA PECTORIS: ICD-10-CM

## 2023-04-28 DIAGNOSIS — J45.20 MILD INTERMITTENT ASTHMA, UNCOMPLICATED: ICD-10-CM

## 2023-04-28 DIAGNOSIS — E78.2 MIXED HYPERLIPIDEMIA: ICD-10-CM

## 2023-04-28 DIAGNOSIS — F51.01 PRIMARY INSOMNIA: ICD-10-CM

## 2023-04-28 PROCEDURE — 1036F TOBACCO NON-USER: CPT | Performed by: INTERNAL MEDICINE

## 2023-04-28 PROCEDURE — G8427 DOCREV CUR MEDS BY ELIG CLIN: HCPCS | Performed by: INTERNAL MEDICINE

## 2023-04-28 PROCEDURE — G8399 PT W/DXA RESULTS DOCUMENT: HCPCS | Performed by: INTERNAL MEDICINE

## 2023-04-28 PROCEDURE — 3017F COLORECTAL CA SCREEN DOC REV: CPT | Performed by: INTERNAL MEDICINE

## 2023-04-28 PROCEDURE — 99213 OFFICE O/P EST LOW 20 MIN: CPT | Performed by: INTERNAL MEDICINE

## 2023-04-28 PROCEDURE — 1090F PRES/ABSN URINE INCON ASSESS: CPT | Performed by: INTERNAL MEDICINE

## 2023-04-28 PROCEDURE — 3078F DIAST BP <80 MM HG: CPT | Performed by: INTERNAL MEDICINE

## 2023-04-28 PROCEDURE — 1123F ACP DISCUSS/DSCN MKR DOCD: CPT | Performed by: INTERNAL MEDICINE

## 2023-04-28 PROCEDURE — 3074F SYST BP LT 130 MM HG: CPT | Performed by: INTERNAL MEDICINE

## 2023-04-28 PROCEDURE — G8417 CALC BMI ABV UP PARAM F/U: HCPCS | Performed by: INTERNAL MEDICINE

## 2023-04-28 ASSESSMENT — PATIENT HEALTH QUESTIONNAIRE - PHQ9
SUM OF ALL RESPONSES TO PHQ QUESTIONS 1-9: 2
1. LITTLE INTEREST OR PLEASURE IN DOING THINGS: 1
10. IF YOU CHECKED OFF ANY PROBLEMS, HOW DIFFICULT HAVE THESE PROBLEMS MADE IT FOR YOU TO DO YOUR WORK, TAKE CARE OF THINGS AT HOME, OR GET ALONG WITH OTHER PEOPLE: 0
SUM OF ALL RESPONSES TO PHQ QUESTIONS 1-9: 2
SUM OF ALL RESPONSES TO PHQ QUESTIONS 1-9: 2
6. FEELING BAD ABOUT YOURSELF - OR THAT YOU ARE A FAILURE OR HAVE LET YOURSELF OR YOUR FAMILY DOWN: 0
9. THOUGHTS THAT YOU WOULD BE BETTER OFF DEAD, OR OF HURTING YOURSELF: 0
SUM OF ALL RESPONSES TO PHQ QUESTIONS 1-9: 2
2. FEELING DOWN, DEPRESSED OR HOPELESS: 1
8. MOVING OR SPEAKING SO SLOWLY THAT OTHER PEOPLE COULD HAVE NOTICED. OR THE OPPOSITE, BEING SO FIGETY OR RESTLESS THAT YOU HAVE BEEN MOVING AROUND A LOT MORE THAN USUAL: 0
3. TROUBLE FALLING OR STAYING ASLEEP: 0
5. POOR APPETITE OR OVEREATING: 0
SUM OF ALL RESPONSES TO PHQ9 QUESTIONS 1 & 2: 2
4. FEELING TIRED OR HAVING LITTLE ENERGY: 0
7. TROUBLE CONCENTRATING ON THINGS, SUCH AS READING THE NEWSPAPER OR WATCHING TELEVISION: 0

## 2023-07-11 RX ORDER — CLOPIDOGREL BISULFATE 75 MG/1
75 TABLET ORAL DAILY
Qty: 90 TABLET | Refills: 3 | Status: SHIPPED | OUTPATIENT
Start: 2023-07-11

## 2023-08-04 RX ORDER — PRAVASTATIN SODIUM 40 MG
40 TABLET ORAL NIGHTLY
Qty: 100 TABLET | Refills: 1 | Status: SHIPPED | OUTPATIENT
Start: 2023-08-04

## 2023-08-04 NOTE — TELEPHONE ENCOUNTER
[facility] CLINICAL PHARMACY: ADHERENCE REVIEW  Identified care gap per [employer]: fills at [facility] : ACE/ARB and Statin adherence    ASSESSMENT    ACE/ARB ADHERENCE    Insurance Records claims through 23 (Prior Year PDC = 81% - PASSED; YTD [address] = 100%; Potential Fail Date: 23): Lisinopril 10 mg tab last filled on 23 for 90 day supply. Next refill due: 10/6/23    Prescribed si tablet/capsule daily    Per Reconcile Dispense History: (seemingly incomplete)  LISINOPRIL  10 MG TABS 2023 90 90 tablet Ezekiel Orellana MD [facility]... LISINOPRIL  10 MG TABS 2023 90 90 tablet Ezekiel Orellana MD [facility]... Last Rx 2/27/23 x 1 yr supply - believe has refill at pharmacy    BP Readings from Last 3 Encounters:   23 (!) 124/58   04/10/23 (!) 137/49   23 (!) 134/58     Estimated Creatinine Clearance: 92 mL/min (A) (based on SCr of 0.5 mg/dL (L)). Lab Results   Component Value Date    CREATININE 0.50 (L) 2023     Lab Results   Component Value Date    K 3.9 2023 [facility] Records claims through 23 (Prior Year PDC = 87% - PASSED; YTD [address] = FIRST FILL; Potential Fail Date: 23):   Pravastatin 40 mg tab last filled on 3/20/23 for 90 day supply. Next refill due: 23    Prescribed si tablet/capsule daily    Per Reconcile Dispense History:   PRAVASTATIN SODIUM  40 MG TABS 2023 90 90 tablet Ezekiel Orellana MD [facility]... PRAVASTATIN SODIUM  40 MG TABS 2022 90 90 tablet Ezekiel Orellana MD [facility]... PRAVASTATIN SODIUM  40 MG TABS 2022 90 90 tablet Ezekiel Orellana MD [facility]... PRAVASTATIN SODIUM  40 MG TABS 2022 90 90 tablet [facility]... PRAVASTATIN SODIUM  40 MG TABS 2022 90 90 tablet [facility]...    Last Rx 4/25/22 x 90 day supply 3 refills - may need refill

## 2023-08-04 NOTE — TELEPHONE ENCOUNTER
Leann Marcelino MD, patient out of refills of pravastatin. Rx pended for your signature/modification as appropriate.  Eligible for 100 day supply through her insurance if OK with you    Last Visit: 4/28/23  Next Visit: 10/31/23    Thank you,  Gomez Jason, PharmD, [facility], toll free: [phone], option 2

## 2023-08-04 NOTE — TELEPHONE ENCOUNTER
Per [address] [facility], Pravastatin 40mg daily is out of refills    Routing encounter back to pharmacist to request refills

## 2023-11-03 ENCOUNTER — OFFICE VISIT (OUTPATIENT)
Dept: INTERNAL MEDICINE CLINIC | Facility: CLINIC | Age: 73
End: 2023-11-03

## 2023-11-03 VITALS
WEIGHT: 154 LBS | BODY MASS INDEX: 31.04 KG/M2 | SYSTOLIC BLOOD PRESSURE: 134 MMHG | DIASTOLIC BLOOD PRESSURE: 50 MMHG | HEART RATE: 55 BPM | HEIGHT: 59 IN | TEMPERATURE: 97.5 F | OXYGEN SATURATION: 97 %

## 2023-11-03 DIAGNOSIS — I25.10 ATHEROSCLEROSIS OF NATIVE CORONARY ARTERY OF NATIVE HEART WITHOUT ANGINA PECTORIS: ICD-10-CM

## 2023-11-03 DIAGNOSIS — I10 ESSENTIAL HYPERTENSION, BENIGN: ICD-10-CM

## 2023-11-03 DIAGNOSIS — Z00.00 MEDICARE ANNUAL WELLNESS VISIT, SUBSEQUENT: Primary | ICD-10-CM

## 2023-11-03 DIAGNOSIS — E78.5 DYSLIPIDEMIA: ICD-10-CM

## 2023-11-03 DIAGNOSIS — Z23 NEEDS FLU SHOT: ICD-10-CM

## 2023-11-03 RX ORDER — LISINOPRIL 10 MG/1
10 TABLET ORAL DAILY
Qty: 90 TABLET | Refills: 3 | Status: SHIPPED | OUTPATIENT
Start: 2023-11-03

## 2023-11-03 RX ORDER — MAGNESIUM 30 MG
30 TABLET ORAL NIGHTLY
COMMUNITY

## 2023-11-03 RX ORDER — PRAVASTATIN SODIUM 40 MG
40 TABLET ORAL NIGHTLY
Qty: 100 TABLET | Refills: 3 | Status: SHIPPED | OUTPATIENT
Start: 2023-11-03

## 2023-11-03 RX ORDER — FLUTICASONE PROPIONATE 50 MCG
2 SPRAY, SUSPENSION (ML) NASAL PRN
Qty: 1 EACH | Refills: 5 | Status: SHIPPED | OUTPATIENT
Start: 2023-11-03

## 2023-11-03 ASSESSMENT — LIFESTYLE VARIABLES
HOW MANY STANDARD DRINKS CONTAINING ALCOHOL DO YOU HAVE ON A TYPICAL DAY: 1 OR 2
HOW OFTEN DO YOU HAVE A DRINK CONTAINING ALCOHOL: 2-3 TIMES A WEEK

## 2023-11-03 ASSESSMENT — PATIENT HEALTH QUESTIONNAIRE - PHQ9
SUM OF ALL RESPONSES TO PHQ QUESTIONS 1-9: 0
10. IF YOU CHECKED OFF ANY PROBLEMS, HOW DIFFICULT HAVE THESE PROBLEMS MADE IT FOR YOU TO DO YOUR WORK, TAKE CARE OF THINGS AT HOME, OR GET ALONG WITH OTHER PEOPLE: 0
3. TROUBLE FALLING OR STAYING ASLEEP: 0
SUM OF ALL RESPONSES TO PHQ QUESTIONS 1-9: 0
9. THOUGHTS THAT YOU WOULD BE BETTER OFF DEAD, OR OF HURTING YOURSELF: 0
SUM OF ALL RESPONSES TO PHQ QUESTIONS 1-9: 0
5. POOR APPETITE OR OVEREATING: 0
SUM OF ALL RESPONSES TO PHQ9 QUESTIONS 1 & 2: 0
1. LITTLE INTEREST OR PLEASURE IN DOING THINGS: 0
4. FEELING TIRED OR HAVING LITTLE ENERGY: 0
7. TROUBLE CONCENTRATING ON THINGS, SUCH AS READING THE NEWSPAPER OR WATCHING TELEVISION: 0
SUM OF ALL RESPONSES TO PHQ QUESTIONS 1-9: 0
2. FEELING DOWN, DEPRESSED OR HOPELESS: 0
6. FEELING BAD ABOUT YOURSELF - OR THAT YOU ARE A FAILURE OR HAVE LET YOURSELF OR YOUR FAMILY DOWN: 0
8. MOVING OR SPEAKING SO SLOWLY THAT OTHER PEOPLE COULD HAVE NOTICED. OR THE OPPOSITE, BEING SO FIGETY OR RESTLESS THAT YOU HAVE BEEN MOVING AROUND A LOT MORE THAN USUAL: 0

## 2023-11-03 ASSESSMENT — COLUMBIA-SUICIDE SEVERITY RATING SCALE - C-SSRS
3. HAVE YOU BEEN THINKING ABOUT HOW YOU MIGHT KILL YOURSELF?: NO
4. HAVE YOU HAD THESE THOUGHTS AND HAD SOME INTENTION OF ACTING ON THEM?: NO
7. DID THIS OCCUR IN THE LAST THREE MONTHS: NO
5. HAVE YOU STARTED TO WORK OUT OR WORKED OUT THE DETAILS OF HOW TO KILL YOURSELF? DO YOU INTEND TO CARRY OUT THIS PLAN?: NO

## 2023-11-03 NOTE — PATIENT INSTRUCTIONS
state.  How do you choose a health care agent? Choose your health care agent carefully. This person may or may not be a family member. Talk to the person before you make your final decision. Make sure this person is comfortable with this responsibility. It's a good idea to choose someone who:  Is at least 25years old. Knows you well and understands what makes life meaningful for you. Understands your Restorationist and moral values. Will do what you want, not what that person wants. Will be able to make difficult choices at a stressful time. Will be able to refuse or stop treatment, if that is what you would want, even if you could die. Will be firm and confident with health professionals if needed. Will ask questions to get needed information. Lives near you or agrees to travel to you if needed. Your family may help you make medical decisions while you can still be part of that process. But it's important to choose one person to be your health care agent in case you aren't able to make decisions for yourself. If you don't fill out the legal form and name a health care agent, the decisions your family can make may be limited. A health care agent may be called something else in your state. Who will make decisions for you if you don't have a health care agent? If you don't have a health care agent or a living will, you may not get the care you want. Decisions may be made by family members who disagree about your medical care. Or decisions may be made by a medical professional who doesn't know you well. In some cases, a  makes the decisions. When you name a health care agent, it is very clear who has the power to make health decisions for you. How do you name a health care agent? You name your health care agent on a legal form. This form is usually called a medical power of . Ask your hospital, state bar association, or office on aging where to find these forms.   You must sign the form to

## 2023-11-03 NOTE — PROGRESS NOTES
11/03/2023   Location:[address] [place]NAL MEDICINE  [place]  Patient #:  [id number]  YOB: 1950        History of Present Illness     Chief Complaint   Patient presents with    Medicare AWV     Subsequent     6 Month Follow-Up     6 month follow-up    Hypertension       Ms. Mercedes Chapman is a 68 y.o. female  who presents for This is a combined medical follow up office visit and a Medicare Wellness Visit. The Wellness note has been reviewed. Follow up on chronic medical issues. There is compliance and tolerance with medications. Chronic active medical issues assessed today include  HTN, HLD, CAD, lymphoma. S/p AVR, depression. Chronic issues are stable on her current medications   She is under going active treatment for recurrence of her lymphoma under the care of Dr Rupal Moses of [facility]. Reviewed most recent note. Keeps regular follow up with cardiologist for CAD, AVR reviewed recent note. Walks 2-3 times a week for 30 minutes.     Last Labs  CBC:   Lab Results   Component Value Date/Time    WBC 5.2 12/08/2022 10:56 AM    RBC 3.98 12/08/2022 10:56 AM    HGB 13.0 12/08/2022 10:56 AM    HCT 39.7 12/08/2022 10:56 AM    MCV 99.7 12/08/2022 10:56 AM    MCH 32.7 12/08/2022 10:56 AM    MCHC 32.7 12/08/2022 10:56 AM    RDW 12.2 12/08/2022 10:56 AM     12/08/2022 10:56 AM    MPV 10.7 12/08/2022 10:56 AM     CMP:    Lab Results   Component Value Date/Time     03/09/2023 02:45 PM    K 3.9 03/09/2023 02:45 PM     03/09/2023 02:45 PM    CO2 28 03/09/2023 02:45 PM    BUN 13 03/09/2023 02:45 PM    CREATININE 0.50 03/09/2023 02:45 PM    GFRAA 109 10/19/2020 09:27 AM    AGRATIO 0.8 09/03/2019 08:16 AM    LABGLOM >60 03/09/2023 02:45 PM    GLUCOSE 103 03/09/2023 02:45 PM    PROT 7.4 09/03/2019 08:16 AM    LABALBU 3.2 09/03/2019 08:16 AM    CALCIUM 9.0 03/09/2023 02:45 PM    BILITOT 0.4 09/03/2019 08:16 AM    ALKPHOS 114 09/03/2019 08:16 AM    AST 9 09/03/2019
CareTeam (Including outside providers/suppliers regularly involved in providing care):   Patient Care Team:  Inocencia Blanchard MD as PCP - Katarina Bolaños MD as PCP - Empaneled Provider     Reviewed and updated this visit:  Tobacco  Allergies  Meds  Med Hx  Surg Hx  Soc Hx  Fam Hx

## 2023-11-06 ENCOUNTER — NURSE ONLY (OUTPATIENT)
Dept: INTERNAL MEDICINE CLINIC | Facility: CLINIC | Age: 73
End: 2023-11-06

## 2023-11-06 DIAGNOSIS — I10 ESSENTIAL HYPERTENSION, BENIGN: ICD-10-CM

## 2023-11-06 DIAGNOSIS — I25.10 ATHEROSCLEROSIS OF NATIVE CORONARY ARTERY OF NATIVE HEART WITHOUT ANGINA PECTORIS: ICD-10-CM

## 2023-11-06 DIAGNOSIS — E78.5 DYSLIPIDEMIA: ICD-10-CM

## 2023-11-06 LAB
CHOLEST SERPL-MCNC: 167 MG/DL
HDLC SERPL-MCNC: 48 MG/DL (ref 40–60)
HDLC SERPL: 3.5
LDLC SERPL CALC-MCNC: 97 MG/DL
TRIGL SERPL-MCNC: 110 MG/DL (ref 35–150)
TSH W FREE THYROID IF ABNORMAL: 2.35 UIU/ML (ref 0.36–3.74)
VLDLC SERPL CALC-MCNC: 22 MG/DL (ref 6–23)

## 2023-11-13 ENCOUNTER — TELEPHONE (OUTPATIENT)
Dept: INTERNAL MEDICINE CLINIC | Facility: CLINIC | Age: 73
End: 2023-11-13

## 2023-11-13 NOTE — TELEPHONE ENCOUNTER
----- Message from Ananya Lopez MD sent at 11/12/2023 11:25 PM EST -----  Thyroid and cholesterol were normal. Continue the same.    Ananya Lopez MD

## 2024-01-31 ENCOUNTER — OFFICE VISIT (OUTPATIENT)
Age: 74
End: 2024-01-31

## 2024-01-31 VITALS
HEIGHT: 59 IN | DIASTOLIC BLOOD PRESSURE: 66 MMHG | WEIGHT: 156 LBS | BODY MASS INDEX: 31.45 KG/M2 | SYSTOLIC BLOOD PRESSURE: 110 MMHG

## 2024-01-31 DIAGNOSIS — I10 ESSENTIAL HYPERTENSION: Primary | ICD-10-CM

## 2024-01-31 DIAGNOSIS — E78.5 DYSLIPIDEMIA: ICD-10-CM

## 2024-01-31 DIAGNOSIS — I25.83 CORONARY ARTERY DISEASE DUE TO LIPID RICH PLAQUE: ICD-10-CM

## 2024-01-31 DIAGNOSIS — I25.10 CORONARY ARTERY DISEASE DUE TO LIPID RICH PLAQUE: ICD-10-CM

## 2024-01-31 DIAGNOSIS — Z95.2 S/P AVR (AORTIC VALVE REPLACEMENT): ICD-10-CM

## 2024-01-31 RX ORDER — LISINOPRIL 10 MG/1
10 TABLET ORAL DAILY
COMMUNITY

## 2024-01-31 RX ORDER — MULTIVITAMIN WITH IRON
250 TABLET ORAL DAILY
COMMUNITY

## 2024-01-31 RX ORDER — NITROGLYCERIN 0.4 MG/1
0.4 TABLET SUBLINGUAL EVERY 5 MIN PRN
Qty: 25 TABLET | Refills: 3 | Status: SHIPPED | OUTPATIENT
Start: 2024-01-31

## 2024-01-31 NOTE — PROGRESS NOTES
[facility] [place] CARDIOLOGY, [address], [address]  [place] [address]  PHONE: [phone]    SUBJECTIVE: /HPI  Mandy Huffman (1950) is a 73 y.o. female seen for a follow up visit regarding the following:   Specialty Problems          Cardiology Problems    Coronary atherosclerosis of native coronary vessel        Essential hypertension, benign        Migraine         PT has a history of lymphoma which is now in remission. PT denies any chest pain. She denies any shortness of breath and does exercise regularly. She does not have any LE edema. She has not experienced any dizziness or syncope. She has no claudication.       ECHO 3/21  EF 50-55% with LV chamber is mildly dilated, LV wall thikness is normal, LV function is low normal/ There are no wall motion abnormalities observed. RSVP 31    Past Medical History, Past Surgical History, Family history, Social History, and Medications were all reviewed with the patient today and updated as necessary.    Allergies   Allergen Reactions    Milk Protein Diarrhea    Sulfa Antibiotics Nausea Only    Adhesive Tape Other (See Comments) and Rash     Skin tears    Other Other (See Comments) and Rash     TAPE,  USE PAPER TAPE--Skin tears     Past Medical History:   Diagnosis Date    Abnormal weight gain     Acute bronchitis     Acute, but ill-defined, cerebrovascular disease 4/20/2015    Adjustment disorder with depressed mood     Allergic rhinitis, cause unspecified 4/21/2015    Anxiety and depression     Celexa    Aortic stenosis     8/20/19 Echo- AV stenosis-  LVEF 55-60%    Chest pressure     pt presented to ER with CP and SOB- work up recommended TAVR.    Disorder of bone and cartilage, unspecified 4/21/2015    Encounter for long-term (current) use of other medications     Former smoker, stopped smoking in distant past     Quit 1998---0.5 ppd x 15 years    GERD (gastroesophageal reflux disease)     valve does not open and close unless laying on Left side

## 2024-05-03 ENCOUNTER — OFFICE VISIT (OUTPATIENT)
Dept: INTERNAL MEDICINE CLINIC | Facility: CLINIC | Age: 74
End: 2024-05-03
Payer: MEDICARE

## 2024-05-03 VITALS
TEMPERATURE: 97.3 F | HEIGHT: 59 IN | OXYGEN SATURATION: 97 % | BODY MASS INDEX: 31.45 KG/M2 | WEIGHT: 156 LBS | SYSTOLIC BLOOD PRESSURE: 136 MMHG | DIASTOLIC BLOOD PRESSURE: 59 MMHG | HEART RATE: 58 BPM

## 2024-05-03 DIAGNOSIS — I25.10 ATHEROSCLEROSIS OF NATIVE CORONARY ARTERY OF NATIVE HEART WITHOUT ANGINA PECTORIS: ICD-10-CM

## 2024-05-03 DIAGNOSIS — Z12.31 BREAST CANCER SCREENING BY MAMMOGRAM: ICD-10-CM

## 2024-05-03 DIAGNOSIS — I10 ESSENTIAL HYPERTENSION, BENIGN: Primary | ICD-10-CM

## 2024-05-03 DIAGNOSIS — E78.2 MIXED HYPERLIPIDEMIA: ICD-10-CM

## 2024-05-03 PROCEDURE — 1090F PRES/ABSN URINE INCON ASSESS: CPT | Performed by: INTERNAL MEDICINE

## 2024-05-03 PROCEDURE — 3017F COLORECTAL CA SCREEN DOC REV: CPT | Performed by: INTERNAL MEDICINE

## 2024-05-03 PROCEDURE — G8399 PT W/DXA RESULTS DOCUMENT: HCPCS | Performed by: INTERNAL MEDICINE

## 2024-05-03 PROCEDURE — 3078F DIAST BP <80 MM HG: CPT | Performed by: INTERNAL MEDICINE

## 2024-05-03 PROCEDURE — 1036F TOBACCO NON-USER: CPT | Performed by: INTERNAL MEDICINE

## 2024-05-03 PROCEDURE — 99213 OFFICE O/P EST LOW 20 MIN: CPT | Performed by: INTERNAL MEDICINE

## 2024-05-03 PROCEDURE — 1123F ACP DISCUSS/DSCN MKR DOCD: CPT | Performed by: INTERNAL MEDICINE

## 2024-05-03 PROCEDURE — 3075F SYST BP GE 130 - 139MM HG: CPT | Performed by: INTERNAL MEDICINE

## 2024-05-03 PROCEDURE — G8427 DOCREV CUR MEDS BY ELIG CLIN: HCPCS | Performed by: INTERNAL MEDICINE

## 2024-05-03 PROCEDURE — G8417 CALC BMI ABV UP PARAM F/U: HCPCS | Performed by: INTERNAL MEDICINE

## 2024-05-03 RX ORDER — ZANUBRUTINIB 80 MG/1
CAPSULE, GELATIN COATED ORAL 2 TIMES DAILY
COMMUNITY
Start: 2024-05-02

## 2024-05-03 RX ORDER — CLOPIDOGREL BISULFATE 75 MG/1
75 TABLET ORAL DAILY
Qty: 90 TABLET | Refills: 3 | Status: SHIPPED | OUTPATIENT
Start: 2024-05-03

## 2024-05-03 SDOH — ECONOMIC STABILITY: FOOD INSECURITY: WITHIN THE PAST 12 MONTHS, THE FOOD YOU BOUGHT JUST DIDN'T LAST AND YOU DIDN'T HAVE MONEY TO GET MORE.: NEVER TRUE

## 2024-05-03 SDOH — ECONOMIC STABILITY: FOOD INSECURITY: WITHIN THE PAST 12 MONTHS, YOU WORRIED THAT YOUR FOOD WOULD RUN OUT BEFORE YOU GOT MONEY TO BUY MORE.: NEVER TRUE

## 2024-05-03 SDOH — ECONOMIC STABILITY: INCOME INSECURITY: HOW HARD IS IT FOR YOU TO PAY FOR THE VERY BASICS LIKE FOOD, HOUSING, MEDICAL CARE, AND HEATING?: NOT HARD AT ALL

## 2024-05-03 NOTE — PROGRESS NOTES
are normal.      Palpations: Abdomen is soft.   Lymphadenopathy:      Cervical: No cervical adenopathy.   Skin:     General: Skin is warm and dry.   Neurological:      General: No focal deficit present.      Mental Status: She is alert.   Psychiatric:         Mood and Affect: Mood normal.             Assessment & Plan    There are no diagnoses linked to this encounter.       Encounter Diagnoses   Name Primary?    Essential hypertension, benign Yes    Mixed hyperlipidemia     Atherosclerosis of native coronary artery of native heart without angina pectoris     Breast cancer screening by mammogram        Orders Placed This Encounter   Medications    clopidogrel (PLAVIX) 75 MG tablet     Sig: Take 1 tablet by mouth daily     Dispense:  90 tablet     Refill:  3       Orders Placed This Encounter   Procedures    EVANGELISTA MARIO ALBERTO DIGITAL SCREEN BILATERAL     Standing Status:   Future     Standing Expiration Date:   7/10/2025     Scheduling Instructions:      [facility]     Order Specific Question:   Reason for exam:     Answer:   screeening       Discussed the importance of regular exercise and a healthy diet.   Chronic medical issues are stable. No change in medications.      Return in about 6 months (around 11/3/2024) for routine follow up of chronic medical issues, and as needed for new or worsening problems.        La Talley MD

## 2024-07-26 DIAGNOSIS — Z95.2 S/P AVR (AORTIC VALVE REPLACEMENT): ICD-10-CM

## 2024-07-26 DIAGNOSIS — I25.10 CORONARY ARTERY DISEASE DUE TO LIPID RICH PLAQUE: ICD-10-CM

## 2024-07-26 DIAGNOSIS — I25.83 CORONARY ARTERY DISEASE DUE TO LIPID RICH PLAQUE: ICD-10-CM

## 2024-07-26 RX ORDER — LISINOPRIL 10 MG/1
10 TABLET ORAL DAILY
Qty: 90 TABLET | Refills: 3 | Status: SHIPPED | OUTPATIENT
Start: 2024-07-26

## 2024-07-31 DIAGNOSIS — Z12.31 BREAST CANCER SCREENING BY MAMMOGRAM: ICD-10-CM

## 2024-08-22 ENCOUNTER — OFFICE VISIT (OUTPATIENT)
Dept: INTERNAL MEDICINE CLINIC | Facility: CLINIC | Age: 74
End: 2024-08-22
Payer: MEDICARE

## 2024-08-22 VITALS
SYSTOLIC BLOOD PRESSURE: 139 MMHG | TEMPERATURE: 97 F | BODY MASS INDEX: 31.85 KG/M2 | HEART RATE: 66 BPM | HEIGHT: 59 IN | DIASTOLIC BLOOD PRESSURE: 69 MMHG | OXYGEN SATURATION: 97 % | WEIGHT: 158 LBS

## 2024-08-22 DIAGNOSIS — R30.0 DYSURIA: ICD-10-CM

## 2024-08-22 DIAGNOSIS — N30.00 ACUTE CYSTITIS WITHOUT HEMATURIA: ICD-10-CM

## 2024-08-22 DIAGNOSIS — R30.0 DYSURIA: Primary | ICD-10-CM

## 2024-08-22 LAB
BACTERIA URNS QL MICRO: NEGATIVE /HPF
EPI CELLS #/AREA URNS HPF: ABNORMAL /HPF (ref 0–5)
HYALINE CASTS URNS QL MICRO: ABNORMAL /LPF
RBC #/AREA URNS HPF: ABNORMAL /HPF (ref 0–5)
WBC URNS QL MICRO: ABNORMAL /HPF (ref 0–4)

## 2024-08-22 PROCEDURE — 3017F COLORECTAL CA SCREEN DOC REV: CPT | Performed by: INTERNAL MEDICINE

## 2024-08-22 PROCEDURE — G8399 PT W/DXA RESULTS DOCUMENT: HCPCS | Performed by: INTERNAL MEDICINE

## 2024-08-22 PROCEDURE — 1036F TOBACCO NON-USER: CPT | Performed by: INTERNAL MEDICINE

## 2024-08-22 PROCEDURE — 1090F PRES/ABSN URINE INCON ASSESS: CPT | Performed by: INTERNAL MEDICINE

## 2024-08-22 PROCEDURE — 99213 OFFICE O/P EST LOW 20 MIN: CPT | Performed by: INTERNAL MEDICINE

## 2024-08-22 PROCEDURE — 1123F ACP DISCUSS/DSCN MKR DOCD: CPT | Performed by: INTERNAL MEDICINE

## 2024-08-22 PROCEDURE — G8427 DOCREV CUR MEDS BY ELIG CLIN: HCPCS | Performed by: INTERNAL MEDICINE

## 2024-08-22 PROCEDURE — 3078F DIAST BP <80 MM HG: CPT | Performed by: INTERNAL MEDICINE

## 2024-08-22 PROCEDURE — 3075F SYST BP GE 130 - 139MM HG: CPT | Performed by: INTERNAL MEDICINE

## 2024-08-22 PROCEDURE — G8417 CALC BMI ABV UP PARAM F/U: HCPCS | Performed by: INTERNAL MEDICINE

## 2024-08-22 RX ORDER — NITROFURANTOIN 25; 75 MG/1; MG/1
100 CAPSULE ORAL 2 TIMES DAILY
Qty: 10 CAPSULE | Refills: 0 | Status: SHIPPED | OUTPATIENT
Start: 2024-08-22 | End: 2024-08-27

## 2024-08-22 ASSESSMENT — PATIENT HEALTH QUESTIONNAIRE - PHQ9
2. FEELING DOWN, DEPRESSED OR HOPELESS: NOT AT ALL
SUM OF ALL RESPONSES TO PHQ QUESTIONS 1-9: 0
7. TROUBLE CONCENTRATING ON THINGS, SUCH AS READING THE NEWSPAPER OR WATCHING TELEVISION: NOT AT ALL
SUM OF ALL RESPONSES TO PHQ QUESTIONS 1-9: 0
3. TROUBLE FALLING OR STAYING ASLEEP: NOT AT ALL
SUM OF ALL RESPONSES TO PHQ9 QUESTIONS 1 & 2: 0
5. POOR APPETITE OR OVEREATING: NOT AT ALL
4. FEELING TIRED OR HAVING LITTLE ENERGY: NOT AT ALL
8. MOVING OR SPEAKING SO SLOWLY THAT OTHER PEOPLE COULD HAVE NOTICED. OR THE OPPOSITE, BEING SO FIGETY OR RESTLESS THAT YOU HAVE BEEN MOVING AROUND A LOT MORE THAN USUAL: NOT AT ALL
10. IF YOU CHECKED OFF ANY PROBLEMS, HOW DIFFICULT HAVE THESE PROBLEMS MADE IT FOR YOU TO DO YOUR WORK, TAKE CARE OF THINGS AT HOME, OR GET ALONG WITH OTHER PEOPLE: NOT DIFFICULT AT ALL
SUM OF ALL RESPONSES TO PHQ QUESTIONS 1-9: 0
6. FEELING BAD ABOUT YOURSELF - OR THAT YOU ARE A FAILURE OR HAVE LET YOURSELF OR YOUR FAMILY DOWN: NOT AT ALL
SUM OF ALL RESPONSES TO PHQ QUESTIONS 1-9: 0
9. THOUGHTS THAT YOU WOULD BE BETTER OFF DEAD, OR OF HURTING YOURSELF: NOT AT ALL
1. LITTLE INTEREST OR PLEASURE IN DOING THINGS: NOT AT ALL

## 2024-08-22 NOTE — PROGRESS NOTES
08/22/2024   Location:[facility] PHYSICIAN SERVICES  [facility] [place] MEDICINE  [place]  Patient #:  [id number]  YOB: 1950        History of Present Illness     Chief Complaint   Patient presents with    Dysuria     Urinary frequency and burning starting this morning        Ms. Huffman is a 73 y.o. female  who presents for  The above complaints which were reviewed with the patient in detail.         Last Labs      Allergies   Allergen Reactions    Milk Protein Diarrhea    Sulfa Antibiotics Nausea Only    Adhesive Tape Other (See Comments) and Rash     Skin tears    Other Other (See Comments) and Rash     TAPE,  USE PAPER TAPE--Skin tears     Past Medical History:   Diagnosis Date    Abnormal weight gain     Acute bronchitis     Acute, but ill-defined, cerebrovascular disease 4/20/2015    Adjustment disorder with depressed mood     Allergic rhinitis, cause unspecified 4/21/2015    Anxiety and depression     Celexa    Aortic stenosis     8/20/19 Echo- AV stenosis-  LVEF 55-60%    Chest pressure     pt presented to ER with CP and SOB- work up recommended TAVR.    Disorder of bone and cartilage, unspecified 4/21/2015    Encounter for long-term (current) use of other medications     Former smoker, stopped smoking in distant past     Quit 1998---0.5 ppd x 15 years    GERD (gastroesophageal reflux disease)     valve does not open and close unless laying on Left side and HOB elevated 4\" & 2 pillows, no medications    H/O heart artery stent 2009    X1    Herpes zoster without mention of complication     History of stroke at age 35    on correlation to migraines    Hypercholesteremia     managed well with Pravastatin    Hypertension     Long term current use of anticoagulant     Plavix and Aspirin 81mg    Lymphoma (HCC)     to bones- no treatment required at this time- Followed by Dr Corona-oncologist    Mixed incontinence urge and stress (male)(female) 4/21/2015    Osteoarthritis, generalized     Other

## 2024-08-24 LAB
BACTERIA SPEC CULT: NORMAL
BACTERIA SPEC CULT: NORMAL
SERVICE CMNT-IMP: NORMAL

## 2024-10-28 DIAGNOSIS — I10 ESSENTIAL HYPERTENSION, BENIGN: ICD-10-CM

## 2024-10-28 LAB
ALBUMIN SERPL-MCNC: 3.7 G/DL (ref 3.2–4.6)
ALBUMIN/GLOB SERPL: 1.8 (ref 1–1.9)
ALP SERPL-CCNC: 80 U/L (ref 35–104)
ALT SERPL-CCNC: 14 U/L (ref 8–45)
ANION GAP SERPL CALC-SCNC: 9 MMOL/L (ref 9–18)
AST SERPL-CCNC: 17 U/L (ref 15–37)
BASOPHILS # BLD: 0.1 K/UL (ref 0–0.2)
BASOPHILS NFR BLD: 1 % (ref 0–2)
BILIRUB SERPL-MCNC: 0.6 MG/DL (ref 0–1.2)
BUN SERPL-MCNC: 7 MG/DL (ref 8–23)
CALCIUM SERPL-MCNC: 8.8 MG/DL (ref 8.8–10.2)
CHLORIDE SERPL-SCNC: 108 MMOL/L (ref 98–107)
CHOLEST SERPL-MCNC: 176 MG/DL (ref 0–200)
CO2 SERPL-SCNC: 27 MMOL/L (ref 20–28)
CREAT SERPL-MCNC: 0.56 MG/DL (ref 0.6–1.1)
DIFFERENTIAL METHOD BLD: NORMAL
EOSINOPHIL # BLD: 0.3 K/UL (ref 0–0.8)
EOSINOPHIL NFR BLD: 6 % (ref 0.5–7.8)
ERYTHROCYTE [DISTWIDTH] IN BLOOD BY AUTOMATED COUNT: 12.3 % (ref 11.9–14.6)
GLOBULIN SER CALC-MCNC: 2.1 G/DL (ref 2.3–3.5)
GLUCOSE SERPL-MCNC: 103 MG/DL (ref 70–99)
HCT VFR BLD AUTO: 42.2 % (ref 35.8–46.3)
HDLC SERPL-MCNC: 49 MG/DL (ref 40–60)
HDLC SERPL: 3.6 (ref 0–5)
HGB BLD-MCNC: 13.5 G/DL (ref 11.7–15.4)
IMM GRANULOCYTES # BLD AUTO: 0 K/UL (ref 0–0.5)
IMM GRANULOCYTES NFR BLD AUTO: 0 % (ref 0–5)
LDLC SERPL CALC-MCNC: 101 MG/DL (ref 0–100)
LYMPHOCYTES # BLD: 1.6 K/UL (ref 0.5–4.6)
LYMPHOCYTES NFR BLD: 28 % (ref 13–44)
MCH RBC QN AUTO: 32.5 PG (ref 26.1–32.9)
MCHC RBC AUTO-ENTMCNC: 32 G/DL (ref 31.4–35)
MCV RBC AUTO: 101.4 FL (ref 82–102)
MONOCYTES # BLD: 0.6 K/UL (ref 0.1–1.3)
MONOCYTES NFR BLD: 11 % (ref 4–12)
NEUTS SEG # BLD: 3.1 K/UL (ref 1.7–8.2)
NEUTS SEG NFR BLD: 54 % (ref 43–78)
NRBC # BLD: 0 K/UL (ref 0–0.2)
PLATELET # BLD AUTO: 178 K/UL (ref 150–450)
PMV BLD AUTO: 11 FL (ref 9.4–12.3)
POTASSIUM SERPL-SCNC: 4 MMOL/L (ref 3.5–5.1)
PROT SERPL-MCNC: 5.8 G/DL (ref 6.3–8.2)
RBC # BLD AUTO: 4.16 M/UL (ref 4.05–5.2)
SODIUM SERPL-SCNC: 144 MMOL/L (ref 136–145)
TRIGL SERPL-MCNC: 134 MG/DL (ref 0–150)
TSH W FREE THYROID IF ABNORMAL: 3.2 UIU/ML (ref 0.27–4.2)
VLDLC SERPL CALC-MCNC: 27 MG/DL (ref 6–23)
WBC # BLD AUTO: 5.6 K/UL (ref 4.3–11.1)

## 2024-10-29 ENCOUNTER — TELEPHONE (OUTPATIENT)
Dept: INTERNAL MEDICINE CLINIC | Facility: CLINIC | Age: 74
End: 2024-10-29

## 2024-10-29 NOTE — TELEPHONE ENCOUNTER
----- Message from Dr. La Talley MD sent at 10/28/2024  6:05 PM EDT -----  Labs are stable   Will discuss further at upcoming appt.  La Talley MD

## 2024-11-11 ENCOUNTER — OFFICE VISIT (OUTPATIENT)
Dept: INTERNAL MEDICINE CLINIC | Facility: CLINIC | Age: 74
End: 2024-11-11

## 2024-11-11 VITALS
TEMPERATURE: 97.5 F | OXYGEN SATURATION: 98 % | WEIGHT: 157.4 LBS | SYSTOLIC BLOOD PRESSURE: 147 MMHG | RESPIRATION RATE: 16 BRPM | HEART RATE: 79 BPM | BODY MASS INDEX: 31.73 KG/M2 | DIASTOLIC BLOOD PRESSURE: 70 MMHG | HEIGHT: 59 IN

## 2024-11-11 DIAGNOSIS — I10 ESSENTIAL HYPERTENSION, BENIGN: ICD-10-CM

## 2024-11-11 DIAGNOSIS — E78.2 MIXED HYPERLIPIDEMIA: ICD-10-CM

## 2024-11-11 DIAGNOSIS — Z23 FLU VACCINE NEED: ICD-10-CM

## 2024-11-11 DIAGNOSIS — I25.83 CORONARY ARTERY DISEASE DUE TO LIPID RICH PLAQUE: ICD-10-CM

## 2024-11-11 DIAGNOSIS — I25.10 CORONARY ARTERY DISEASE DUE TO LIPID RICH PLAQUE: ICD-10-CM

## 2024-11-11 DIAGNOSIS — Z00.00 MEDICARE ANNUAL WELLNESS VISIT, SUBSEQUENT: Primary | ICD-10-CM

## 2024-11-11 RX ORDER — PRAVASTATIN SODIUM 40 MG
40 TABLET ORAL NIGHTLY
Qty: 100 TABLET | Refills: 3 | Status: SHIPPED | OUTPATIENT
Start: 2024-11-11

## 2024-11-11 RX ORDER — FLUTICASONE PROPIONATE 50 MCG
2 SPRAY, SUSPENSION (ML) NASAL PRN
Qty: 1 EACH | Refills: 5 | Status: SHIPPED | OUTPATIENT
Start: 2024-11-11

## 2024-11-11 RX ORDER — METOPROLOL TARTRATE 25 MG/1
TABLET, FILM COATED ORAL
Qty: 180 TABLET | Refills: 3 | Status: SHIPPED | OUTPATIENT
Start: 2024-11-11

## 2024-11-11 ASSESSMENT — PATIENT HEALTH QUESTIONNAIRE - PHQ9
SUM OF ALL RESPONSES TO PHQ QUESTIONS 1-9: 1
SUM OF ALL RESPONSES TO PHQ QUESTIONS 1-9: 1
9. THOUGHTS THAT YOU WOULD BE BETTER OFF DEAD, OR OF HURTING YOURSELF: NOT AT ALL
6. FEELING BAD ABOUT YOURSELF - OR THAT YOU ARE A FAILURE OR HAVE LET YOURSELF OR YOUR FAMILY DOWN: NOT AT ALL
SUM OF ALL RESPONSES TO PHQ9 QUESTIONS 1 & 2: 0
SUM OF ALL RESPONSES TO PHQ QUESTIONS 1-9: 1
8. MOVING OR SPEAKING SO SLOWLY THAT OTHER PEOPLE COULD HAVE NOTICED. OR THE OPPOSITE, BEING SO FIGETY OR RESTLESS THAT YOU HAVE BEEN MOVING AROUND A LOT MORE THAN USUAL: NOT AT ALL
3. TROUBLE FALLING OR STAYING ASLEEP: SEVERAL DAYS
10. IF YOU CHECKED OFF ANY PROBLEMS, HOW DIFFICULT HAVE THESE PROBLEMS MADE IT FOR YOU TO DO YOUR WORK, TAKE CARE OF THINGS AT HOME, OR GET ALONG WITH OTHER PEOPLE: NOT DIFFICULT AT ALL
5. POOR APPETITE OR OVEREATING: NOT AT ALL
7. TROUBLE CONCENTRATING ON THINGS, SUCH AS READING THE NEWSPAPER OR WATCHING TELEVISION: NOT AT ALL
2. FEELING DOWN, DEPRESSED OR HOPELESS: NOT AT ALL
1. LITTLE INTEREST OR PLEASURE IN DOING THINGS: NOT AT ALL
4. FEELING TIRED OR HAVING LITTLE ENERGY: NOT AT ALL
SUM OF ALL RESPONSES TO PHQ QUESTIONS 1-9: 1

## 2024-11-11 ASSESSMENT — LIFESTYLE VARIABLES
HOW OFTEN DO YOU HAVE A DRINK CONTAINING ALCOHOL: 2-3 TIMES A WEEK
HOW MANY STANDARD DRINKS CONTAINING ALCOHOL DO YOU HAVE ON A TYPICAL DAY: 1 OR 2

## 2024-11-11 NOTE — PATIENT INSTRUCTIONS
for Mandy Huffman - 11/11/2024  Medicare offers a range of preventive health benefits. Some of the tests and screenings are paid in full while other may be subject to a deductible, co-insurance, and/or copay.    Some of these benefits include a comprehensive review of your medical history including lifestyle, illnesses that may run in your family, and various assessments and screenings as appropriate.    After reviewing your medical record and screening and assessments performed today your provider may have ordered immunizations, labs, imaging, and/or referrals for you.  A list of these orders (if applicable) as well as your Preventive Care list are included within your After Visit Summary for your review.    Other Preventive Recommendations:    A preventive eye exam performed by an eye specialist is recommended every 1-2 years to screen for glaucoma; cataracts, macular degeneration, and other eye disorders.  A preventive dental visit is recommended every 6 months.  Try to get at least 150 minutes of exercise per week or 10,000 steps per day on a pedometer .  Order or download the FREE \"Exercise & Physical Activity: Your Everyday Guide\" from [employer]. Call 1-710.868.6257 or search [employer] online.  You need 8719-1711 mg of calcium and 7185-6187 IU of vitamin D per day. It is possible to meet your calcium requirement with diet alone, but a vitamin D supplement is usually necessary to meet this goal.  When exposed to the sun, use a sunscreen that protects against both UVA and UVB radiation with an SPF of 30 or greater. Reapply every 2 to 3 hours or after sweating, drying off with a towel, or swimming.  Always wear a seat belt when traveling in a car. Always wear a helmet when riding a bicycle or motorcycle.

## 2024-11-11 NOTE — PROGRESS NOTES
Medicare Annual Wellness Visit    Mandy Huffman is here for Medicare AWV (Subsequent) and Follow-up Chronic Condition (Pt presents to the office today for a routine check-up )    Assessment & Plan   Medicare annual wellness visit, subsequent  Flu vaccine need  -     Influenza, FLUAD Trivalent, (age 65 y+), IM, Preservative Free, 0.5mL    Recommendations for Preventive Services Due: see orders and patient instructions/AVS.  Recommended screening schedule for the next 5-10 years is provided to the patient in written form: see Patient Instructions/AVS.     No follow-ups on file.     Subjective   The following acute and/or chronic problems were also addressed today:  See separate progress note.      Patient's complete Health Risk Assessment and screening values have been reviewed and are found in Flowsheets. The following problems were reviewed today and where indicated follow up appointments were made and/or referrals ordered.    Positive Risk Factor Screenings with Interventions:                Abnormal BMI (obese):  Body mass index is 31.79 kg/m². (!) Abnormal  Interventions:  See AVS for additional education material                           Objective   Vitals:    11/11/24 1435 11/11/24 1447   BP: (!) 154/56 (!) 147/70   Site: Left Upper Arm Right Upper Arm   Position: Sitting Standing   Cuff Size: Large Adult Large Adult   Pulse: 67 79   Resp: 16    Temp: 97.5 °F (36.4 °C)    TempSrc: Temporal    SpO2: 98%    Weight: 71.4 kg (157 lb 6.4 oz)    Height: 1.499 m (4' 11\")       Body mass index is 31.79 kg/m².                    Allergies   Allergen Reactions    Milk Protein Diarrhea    Sulfa Antibiotics Nausea Only    Adhesive Tape Other (See Comments) and Rash     Skin tears    Other Other (See Comments) and Rash     TAPE,  USE PAPER TAPE--Skin tears     Prior to Visit Medications    Medication Sig Taking? Authorizing Provider   lisinopril (PRINIVIL;ZESTRIL) 10 MG tablet TAKE 1 TABLET BY MOUTH DAILY Yes

## 2024-11-11 NOTE — PROGRESS NOTES
11/11/2024   Location:[facility] PHYSICIAN SERVICES  [facility] [place]ICINE  [place]  Patient #:  [id number]  YOB: 1950        History of Present Illness     Chief Complaint   Patient presents with    Medicare AWV     Subsequent    Follow-up Chronic Condition     Pt presents to the office today for a routine check-up        Ms. Huffman is a 74 y.o. female  who presents for This is a combined medical follow up office visit and a Medicare Wellness Visit.  The Wellness note has been reviewed.   Follow up on chronic medical issues. There is compliance and tolerance with medications.    Chronic active medical issues assessed today include  HTN, HLD, CAD, lymphoma. S/p AVR, depression.  Chronic issues are stable on her current medications   She is under going active treatment for recurrence of her lymphoma under the care of Dr Hartmann of [facility]. Reviewed most recent note.  Keeps regular follow up with cardiologist for CAD, AVR reviewed recent note.      Last Labs  CBC:   Lab Results   Component Value Date/Time    WBC 5.6 10/28/2024 08:24 AM    RBC 4.16 10/28/2024 08:24 AM    HGB 13.5 10/28/2024 08:24 AM    HCT 42.2 10/28/2024 08:24 AM    .4 10/28/2024 08:24 AM    MCH 32.5 10/28/2024 08:24 AM    MCHC 32.0 10/28/2024 08:24 AM    RDW 12.3 10/28/2024 08:24 AM     10/28/2024 08:24 AM    MPV 11.0 10/28/2024 08:24 AM     CMP:    Lab Results   Component Value Date/Time     10/28/2024 08:24 AM    K 4.0 10/28/2024 08:24 AM     10/28/2024 08:24 AM    CO2 27 10/28/2024 08:24 AM    BUN 7 10/28/2024 08:24 AM    CREATININE 0.56 10/28/2024 08:24 AM    GFRAA 109 10/19/2020 09:27 AM    AGRATIO 0.8 09/03/2019 08:16 AM    LABGLOM >90 10/28/2024 08:24 AM    LABGLOM >60 03/09/2023 02:45 PM    GLUCOSE 103 10/28/2024 08:24 AM    CALCIUM 8.8 10/28/2024 08:24 AM    BILITOT 0.6 10/28/2024 08:24 AM    ALKPHOS 80 10/28/2024 08:24 AM    ALKPHOS 114 09/03/2019 08:16 AM    AST 17 10/28/2024 08:24 AM

## 2025-01-21 ENCOUNTER — OFFICE VISIT (OUTPATIENT)
Age: 75
End: 2025-01-21
Payer: MEDICARE

## 2025-01-21 VITALS
DIASTOLIC BLOOD PRESSURE: 88 MMHG | HEART RATE: 54 BPM | HEIGHT: 59 IN | BODY MASS INDEX: 31.65 KG/M2 | WEIGHT: 157 LBS | SYSTOLIC BLOOD PRESSURE: 132 MMHG

## 2025-01-21 DIAGNOSIS — I25.10 CORONARY ARTERY DISEASE DUE TO LIPID RICH PLAQUE: ICD-10-CM

## 2025-01-21 DIAGNOSIS — Z95.2 S/P AVR (AORTIC VALVE REPLACEMENT): ICD-10-CM

## 2025-01-21 DIAGNOSIS — I73.9 CLAUDICATION (HCC): ICD-10-CM

## 2025-01-21 DIAGNOSIS — I25.83 CORONARY ARTERY DISEASE DUE TO LIPID RICH PLAQUE: ICD-10-CM

## 2025-01-21 DIAGNOSIS — I10 ESSENTIAL HYPERTENSION: Primary | ICD-10-CM

## 2025-01-21 DIAGNOSIS — E78.5 DYSLIPIDEMIA: ICD-10-CM

## 2025-01-21 PROCEDURE — 1090F PRES/ABSN URINE INCON ASSESS: CPT | Performed by: INTERNAL MEDICINE

## 2025-01-21 PROCEDURE — G8417 CALC BMI ABV UP PARAM F/U: HCPCS | Performed by: INTERNAL MEDICINE

## 2025-01-21 PROCEDURE — G8427 DOCREV CUR MEDS BY ELIG CLIN: HCPCS | Performed by: INTERNAL MEDICINE

## 2025-01-21 PROCEDURE — 1159F MED LIST DOCD IN RCRD: CPT | Performed by: INTERNAL MEDICINE

## 2025-01-21 PROCEDURE — 1126F AMNT PAIN NOTED NONE PRSNT: CPT | Performed by: INTERNAL MEDICINE

## 2025-01-21 PROCEDURE — 3017F COLORECTAL CA SCREEN DOC REV: CPT | Performed by: INTERNAL MEDICINE

## 2025-01-21 PROCEDURE — 3075F SYST BP GE 130 - 139MM HG: CPT | Performed by: INTERNAL MEDICINE

## 2025-01-21 PROCEDURE — 99214 OFFICE O/P EST MOD 30 MIN: CPT | Performed by: INTERNAL MEDICINE

## 2025-01-21 PROCEDURE — 1036F TOBACCO NON-USER: CPT | Performed by: INTERNAL MEDICINE

## 2025-01-21 PROCEDURE — 1123F ACP DISCUSS/DSCN MKR DOCD: CPT | Performed by: INTERNAL MEDICINE

## 2025-01-21 PROCEDURE — G8399 PT W/DXA RESULTS DOCUMENT: HCPCS | Performed by: INTERNAL MEDICINE

## 2025-01-21 PROCEDURE — 3079F DIAST BP 80-89 MM HG: CPT | Performed by: INTERNAL MEDICINE

## 2025-01-21 PROCEDURE — 93000 ELECTROCARDIOGRAM COMPLETE: CPT | Performed by: INTERNAL MEDICINE

## 2025-01-21 RX ORDER — NITROGLYCERIN 0.4 MG/1
0.4 TABLET SUBLINGUAL EVERY 5 MIN PRN
Qty: 25 TABLET | Refills: 3 | Status: SHIPPED | OUTPATIENT
Start: 2025-01-21

## 2025-01-21 NOTE — PROGRESS NOTES
[facility], [address], [address]  [place] [address]  PHONE: [phone]    SUBJECTIVE: /HPI  Mandy Huffman (1950) is a 74 y.o. female seen for a follow up visit regarding the following:   Specialty Problems          Cardiology Problems    Coronary atherosclerosis of native coronary vessel        Essential hypertension, benign        Migraine         Pt doing well. No chest pain. No palpitations. Patient denies syncope. No dyspnea. States they are taking meds. Maintains a normal level of activity for them without symptoms. No dizziness or lightheadedness. All above conditions stable.    Following a positive screening for peripheral arterial disease performing an YOBANY duplex is a critical next step for confirming the diagnosis.  Patient exhibits either multiple risk factors for PAD or symptoms that could include leg pain or cramping during walking or exercise that may have sores or wounds that are slow to heal and may experience temperature differences in her extremities or color changes in the lower extremities they may experience numbness tingling or weakness in the legs there may also have leg pain at night that improves when sitting up or standing.  That may also endorse reduced level of activity due to leg symptoms the YOBANY is a noninvasive but reliable tool to assess the severity of arterial blockages and to guide further management.  Combining YOBANY with duplex ultrasound enhances diagnostic accuracy by visualizing the arterial system thus providing a comprehensive evaluation of both flow dynamics in the presence of any arterial stenosis.  Early detection and accurate assessment through these tools are essential for preventing disease progression and improving outcomes.  Patient has undergone thorough history physical examination and review of systems including E and M time management of 40 minutes to rise [place] [address] billing criteria.  Patient will be offered and YOBANY

## 2025-04-03 DIAGNOSIS — Z95.2 S/P AVR (AORTIC VALVE REPLACEMENT): ICD-10-CM

## 2025-04-03 DIAGNOSIS — I25.10 ATHEROSCLEROSIS OF NATIVE CORONARY ARTERY OF NATIVE HEART WITHOUT ANGINA PECTORIS: Primary | ICD-10-CM

## 2025-04-03 RX ORDER — CLOPIDOGREL BISULFATE 75 MG/1
TABLET ORAL
Qty: 90 TABLET | Refills: 3 | Status: SHIPPED | OUTPATIENT
Start: 2025-04-03

## 2025-05-09 SDOH — HEALTH STABILITY: PHYSICAL HEALTH: ON AVERAGE, HOW MANY DAYS PER WEEK DO YOU ENGAGE IN MODERATE TO STRENUOUS EXERCISE (LIKE A BRISK WALK)?: 4 DAYS

## 2025-05-09 SDOH — HEALTH STABILITY: PHYSICAL HEALTH: ON AVERAGE, HOW MANY MINUTES DO YOU ENGAGE IN EXERCISE AT THIS LEVEL?: 30 MIN

## 2025-05-09 ASSESSMENT — LIFESTYLE VARIABLES
HOW OFTEN DO YOU HAVE SIX OR MORE DRINKS ON ONE OCCASION: 1
HOW MANY STANDARD DRINKS CONTAINING ALCOHOL DO YOU HAVE ON A TYPICAL DAY: 1 OR 2
HOW MANY STANDARD DRINKS CONTAINING ALCOHOL DO YOU HAVE ON A TYPICAL DAY: 1
HOW OFTEN DO YOU HAVE A DRINK CONTAINING ALCOHOL: MONTHLY OR LESS
HOW OFTEN DO YOU HAVE A DRINK CONTAINING ALCOHOL: 2

## 2025-05-09 ASSESSMENT — PATIENT HEALTH QUESTIONNAIRE - PHQ9
SUM OF ALL RESPONSES TO PHQ QUESTIONS 1-9: 0
SUM OF ALL RESPONSES TO PHQ QUESTIONS 1-9: 0
1. LITTLE INTEREST OR PLEASURE IN DOING THINGS: NOT AT ALL
SUM OF ALL RESPONSES TO PHQ QUESTIONS 1-9: 0
SUM OF ALL RESPONSES TO PHQ QUESTIONS 1-9: 0
2. FEELING DOWN, DEPRESSED OR HOPELESS: NOT AT ALL

## 2025-05-13 ENCOUNTER — OFFICE VISIT (OUTPATIENT)
Dept: INTERNAL MEDICINE CLINIC | Facility: CLINIC | Age: 75
End: 2025-05-13
Payer: MEDICARE

## 2025-05-13 VITALS
BODY MASS INDEX: 31.71 KG/M2 | SYSTOLIC BLOOD PRESSURE: 148 MMHG | DIASTOLIC BLOOD PRESSURE: 78 MMHG | TEMPERATURE: 97.3 F | HEART RATE: 56 BPM | OXYGEN SATURATION: 95 % | WEIGHT: 157 LBS

## 2025-05-13 DIAGNOSIS — I25.10 CORONARY ARTERY DISEASE DUE TO LIPID RICH PLAQUE: ICD-10-CM

## 2025-05-13 DIAGNOSIS — Z00.00 MEDICARE ANNUAL WELLNESS VISIT, SUBSEQUENT: Primary | ICD-10-CM

## 2025-05-13 DIAGNOSIS — I10 ESSENTIAL HYPERTENSION, BENIGN: ICD-10-CM

## 2025-05-13 DIAGNOSIS — I25.83 CORONARY ARTERY DISEASE DUE TO LIPID RICH PLAQUE: ICD-10-CM

## 2025-05-13 DIAGNOSIS — E78.2 MIXED HYPERLIPIDEMIA: ICD-10-CM

## 2025-05-13 LAB
CHOLEST SERPL-MCNC: 195 MG/DL (ref 0–200)
HDLC SERPL-MCNC: 45 MG/DL (ref 40–60)
HDLC SERPL: 4.3 (ref 0–5)
LDLC SERPL CALC-MCNC: 107 MG/DL (ref 0–100)
TRIGL SERPL-MCNC: 216 MG/DL (ref 0–150)
VLDLC SERPL CALC-MCNC: 43 MG/DL (ref 6–23)

## 2025-05-13 PROCEDURE — G0439 PPPS, SUBSEQ VISIT: HCPCS | Performed by: INTERNAL MEDICINE

## 2025-05-13 PROCEDURE — 99213 OFFICE O/P EST LOW 20 MIN: CPT | Performed by: INTERNAL MEDICINE

## 2025-05-13 PROCEDURE — 1160F RVW MEDS BY RX/DR IN RCRD: CPT | Performed by: INTERNAL MEDICINE

## 2025-05-13 PROCEDURE — G8427 DOCREV CUR MEDS BY ELIG CLIN: HCPCS | Performed by: INTERNAL MEDICINE

## 2025-05-13 PROCEDURE — 3077F SYST BP >= 140 MM HG: CPT | Performed by: INTERNAL MEDICINE

## 2025-05-13 PROCEDURE — G8417 CALC BMI ABV UP PARAM F/U: HCPCS | Performed by: INTERNAL MEDICINE

## 2025-05-13 PROCEDURE — 3078F DIAST BP <80 MM HG: CPT | Performed by: INTERNAL MEDICINE

## 2025-05-13 PROCEDURE — 1036F TOBACCO NON-USER: CPT | Performed by: INTERNAL MEDICINE

## 2025-05-13 PROCEDURE — 1090F PRES/ABSN URINE INCON ASSESS: CPT | Performed by: INTERNAL MEDICINE

## 2025-05-13 PROCEDURE — 3017F COLORECTAL CA SCREEN DOC REV: CPT | Performed by: INTERNAL MEDICINE

## 2025-05-13 PROCEDURE — G8399 PT W/DXA RESULTS DOCUMENT: HCPCS | Performed by: INTERNAL MEDICINE

## 2025-05-13 PROCEDURE — 1123F ACP DISCUSS/DSCN MKR DOCD: CPT | Performed by: INTERNAL MEDICINE

## 2025-05-13 PROCEDURE — 1159F MED LIST DOCD IN RCRD: CPT | Performed by: INTERNAL MEDICINE

## 2025-05-13 SDOH — ECONOMIC STABILITY: FOOD INSECURITY: WITHIN THE PAST 12 MONTHS, THE FOOD YOU BOUGHT JUST DIDN'T LAST AND YOU DIDN'T HAVE MONEY TO GET MORE.: NEVER TRUE

## 2025-05-13 SDOH — ECONOMIC STABILITY: FOOD INSECURITY: WITHIN THE PAST 12 MONTHS, YOU WORRIED THAT YOUR FOOD WOULD RUN OUT BEFORE YOU GOT MONEY TO BUY MORE.: NEVER TRUE

## 2025-05-13 ASSESSMENT — PATIENT HEALTH QUESTIONNAIRE - PHQ9
9. THOUGHTS THAT YOU WOULD BE BETTER OFF DEAD, OR OF HURTING YOURSELF: NOT AT ALL
SUM OF ALL RESPONSES TO PHQ QUESTIONS 1-9: 2
5. POOR APPETITE OR OVEREATING: NOT AT ALL
3. TROUBLE FALLING OR STAYING ASLEEP: SEVERAL DAYS
1. LITTLE INTEREST OR PLEASURE IN DOING THINGS: NOT AT ALL
8. MOVING OR SPEAKING SO SLOWLY THAT OTHER PEOPLE COULD HAVE NOTICED. OR THE OPPOSITE, BEING SO FIGETY OR RESTLESS THAT YOU HAVE BEEN MOVING AROUND A LOT MORE THAN USUAL: NOT AT ALL
7. TROUBLE CONCENTRATING ON THINGS, SUCH AS READING THE NEWSPAPER OR WATCHING TELEVISION: NOT AT ALL
2. FEELING DOWN, DEPRESSED OR HOPELESS: NOT AT ALL
6. FEELING BAD ABOUT YOURSELF - OR THAT YOU ARE A FAILURE OR HAVE LET YOURSELF OR YOUR FAMILY DOWN: NOT AT ALL
SUM OF ALL RESPONSES TO PHQ QUESTIONS 1-9: 2
SUM OF ALL RESPONSES TO PHQ QUESTIONS 1-9: 2
4. FEELING TIRED OR HAVING LITTLE ENERGY: SEVERAL DAYS
SUM OF ALL RESPONSES TO PHQ QUESTIONS 1-9: 2
10. IF YOU CHECKED OFF ANY PROBLEMS, HOW DIFFICULT HAVE THESE PROBLEMS MADE IT FOR YOU TO DO YOUR WORK, TAKE CARE OF THINGS AT HOME, OR GET ALONG WITH OTHER PEOPLE: NOT DIFFICULT AT ALL

## 2025-05-13 NOTE — PROGRESS NOTES
05/13/2025   Location:[facility] PHYSICIAN SERVICES  [facility] [place]ICINE  [place]  Patient #:  [id number]  YOB: 1950        History of Present Illness     Chief Complaint   Patient presents with    Medicare AWV       Ms. Huffman is a 74 y.o. female  who presents for who presents for This is a combined medical follow up office visit and a Medicare Wellness Visit.  The Wellness note has been reviewed.   Follow up on chronic medical issues. There is compliance and tolerance with medications.    Chronic active medical issues assessed today include  HTN, HLD, CAD, lymphoma. S/p AVR, depression.  Chronic issues are stable on her current medications     History of Present Illness  The patient is a 74-year-old female who presents for a routine follow-up.    She reports no new health issues since her last visit. She has been monitoring her blood pressure at home, although inconsistently. During a recent visit to her cardiologist last month, her blood pressure was noted to be normal. Her cardiologist appointment is scheduled for 07/29/2025. She has not experienced any recent episodes of chest pain or shortness of breath. She has not experienced any ankle swelling. She has a healthcare power of  in place but does not have a living will. She has been adhering to her medication regimen, including her cholesterol medication. She maintains an active lifestyle, engaging in regular exercise such as using a glider and cycling. She has been making efforts to improve her diet and increase her physical activity.    She has been under the care of Dr. Bustillos, an oncologist, for lymphoma management. She had a consultation with her oncologist a few weeks ago.    She reports no bowel or bladder issues. She has a single joint that causes her discomfort. She has not required any interventions for her legs and has been managing with rest.        Last Labs  CBC:   Lab Results   Component Value Date/Time

## 2025-05-13 NOTE — PROGRESS NOTES
Medicare Annual Wellness Visit    Mandy Huffman is here for Medicare AWV    Assessment & Plan   Medicare annual wellness visit, subsequent  Coronary artery disease due to lipid rich plaque  -     Lipid Panel; Future  Mixed hyperlipidemia  -     Lipid Panel; Future  Essential hypertension, benign  -     Lipid Panel; Future       No follow-ups on file.     Subjective   The following acute and/or chronic problems were also addressed today:  See separate progress note.      Patient's complete Health Risk Assessment and screening values have been reviewed and are found in Flowsheets. The following problems were reviewed today and where indicated follow up appointments were made and/or referrals ordered.    Positive Risk Factor Screenings with Interventions:                Abnormal BMI (obese):  Body mass index is 31.71 kg/m². (!) Abnormal  Interventions:  See AVS for additional education material             Advanced Directives:  Do you have a Living Will?: (!) (Proxy-Rptd) No    Intervention:  has NO advanced directive - information provided                     Objective   Vitals:    05/13/25 0932 05/13/25 0939 05/13/25 0940 05/13/25 1004   BP: (!) 176/80 (!) 165/96 (!) 164/82 (!) 148/78   Pulse: 56      Temp: 97.3 °F (36.3 °C)      TempSrc: Temporal      SpO2: 95%      Weight: 71.2 kg (157 lb)         Body mass index is 31.71 kg/m².                    Allergies   Allergen Reactions    Milk Protein Diarrhea    Sulfa Antibiotics Nausea Only    Adhesive Tape Other (See Comments) and Rash     Skin tears    Other Other (See Comments) and Rash     TAPE,  USE PAPER TAPE--Skin tears     Prior to Visit Medications    Medication Sig Taking? Authorizing Provider   clopidogrel (PLAVIX) 75 MG tablet TAKE ONE TABLET BY MOUTH ONCE A DAY TO PREVENT BLOOD CLOTS Yes Zoe Nam APRN - NP   nitroGLYCERIN (NITROSTAT) 0.4 MG SL tablet Place 1 tablet under the tongue every 5 minutes as needed for Chest pain Yes Ham Puentes,

## 2025-05-20 ENCOUNTER — TELEPHONE (OUTPATIENT)
Dept: INTERNAL MEDICINE CLINIC | Facility: CLINIC | Age: 75
End: 2025-05-20

## 2025-05-20 DIAGNOSIS — I25.83 CORONARY ARTERY DISEASE DUE TO LIPID RICH PLAQUE: ICD-10-CM

## 2025-05-20 DIAGNOSIS — Z95.2 S/P AVR (AORTIC VALVE REPLACEMENT): ICD-10-CM

## 2025-05-20 DIAGNOSIS — I25.10 CORONARY ARTERY DISEASE DUE TO LIPID RICH PLAQUE: ICD-10-CM

## 2025-05-20 RX ORDER — LISINOPRIL 20 MG/1
20 TABLET ORAL DAILY
Qty: 90 TABLET | Refills: 3 | Status: SHIPPED | OUTPATIENT
Start: 2025-05-20

## 2025-05-20 NOTE — TELEPHONE ENCOUNTER
Ms. Huffman and has stated that her blood pressure has been up and down.  Dr. Talley had told her that she would up her dose on her blood pressure medication.      Metoprolol tartrate 25 mg  Lisinopril 10 mg       Tuesday,  May 13-  148/77 Wednesday, May 14- 161/88  Thursday  May 15-   145/71  Friday May 16-    136/61  Saturday May 17 -152/70  Miky May 18- 161/72

## 2025-05-20 NOTE — TELEPHONE ENCOUNTER
Increase Lisinopril to 20mg dialy   Take 2 of her 10mg tablets daily.   I will send in a refill for 20mg tablets to be taken once a day  Schedule BP follow up with me or NP in  2 -3 weeks.   La Talley MD

## 2025-06-10 ENCOUNTER — OFFICE VISIT (OUTPATIENT)
Dept: INTERNAL MEDICINE CLINIC | Facility: CLINIC | Age: 75
End: 2025-06-10
Payer: MEDICARE

## 2025-06-10 VITALS
DIASTOLIC BLOOD PRESSURE: 70 MMHG | SYSTOLIC BLOOD PRESSURE: 138 MMHG | BODY MASS INDEX: 31.45 KG/M2 | HEIGHT: 59 IN | HEART RATE: 56 BPM | OXYGEN SATURATION: 100 % | WEIGHT: 156 LBS

## 2025-06-10 DIAGNOSIS — I10 ESSENTIAL HYPERTENSION, BENIGN: Primary | ICD-10-CM

## 2025-06-10 PROCEDURE — G8399 PT W/DXA RESULTS DOCUMENT: HCPCS | Performed by: INTERNAL MEDICINE

## 2025-06-10 PROCEDURE — 3017F COLORECTAL CA SCREEN DOC REV: CPT | Performed by: INTERNAL MEDICINE

## 2025-06-10 PROCEDURE — 1036F TOBACCO NON-USER: CPT | Performed by: INTERNAL MEDICINE

## 2025-06-10 PROCEDURE — 3078F DIAST BP <80 MM HG: CPT | Performed by: INTERNAL MEDICINE

## 2025-06-10 PROCEDURE — 99213 OFFICE O/P EST LOW 20 MIN: CPT | Performed by: INTERNAL MEDICINE

## 2025-06-10 PROCEDURE — 1090F PRES/ABSN URINE INCON ASSESS: CPT | Performed by: INTERNAL MEDICINE

## 2025-06-10 PROCEDURE — G8427 DOCREV CUR MEDS BY ELIG CLIN: HCPCS | Performed by: INTERNAL MEDICINE

## 2025-06-10 PROCEDURE — 1159F MED LIST DOCD IN RCRD: CPT | Performed by: INTERNAL MEDICINE

## 2025-06-10 PROCEDURE — 1123F ACP DISCUSS/DSCN MKR DOCD: CPT | Performed by: INTERNAL MEDICINE

## 2025-06-10 PROCEDURE — 1160F RVW MEDS BY RX/DR IN RCRD: CPT | Performed by: INTERNAL MEDICINE

## 2025-06-10 PROCEDURE — G8417 CALC BMI ABV UP PARAM F/U: HCPCS | Performed by: INTERNAL MEDICINE

## 2025-06-10 PROCEDURE — 3075F SYST BP GE 130 - 139MM HG: CPT | Performed by: INTERNAL MEDICINE

## 2025-06-10 ASSESSMENT — PATIENT HEALTH QUESTIONNAIRE - PHQ9
SUM OF ALL RESPONSES TO PHQ QUESTIONS 1-9: 0
2. FEELING DOWN, DEPRESSED OR HOPELESS: NOT AT ALL
SUM OF ALL RESPONSES TO PHQ QUESTIONS 1-9: 0
1. LITTLE INTEREST OR PLEASURE IN DOING THINGS: NOT AT ALL

## 2025-06-10 NOTE — PROGRESS NOTES
06/10/2025   Location:[facility] PHYSICIAN SERVICES  [facility] [place]INE  [place]  Patient #:  [id number]  YOB: 1950        History of Present Illness     Chief Complaint   Patient presents with    Hypertension       Ms. Huffman is a 74 y.o. female  who presents for BP follow up after medication adjustment.   [id number]    st Labs  CBC:   Lab Results   Component Value Date/Time    WBC 5.6 10/28/2024 08:24 AM    RBC 4.16 10/28/2024 08:24 AM    HGB 13.5 10/28/2024 08:24 AM    HCT 42.2 10/28/2024 08:24 AM    .4 10/28/2024 08:24 AM    MCH 32.5 10/28/2024 08:24 AM    MCHC 32.0 10/28/2024 08:24 AM    RDW 12.3 10/28/2024 08:24 AM     10/28/2024 08:24 AM    MPV 11.0 10/28/2024 08:24 AM     CMP:    Lab Results   Component Value Date/Time     10/28/2024 08:24 AM    K 4.0 10/28/2024 08:24 AM     10/28/2024 08:24 AM    CO2 27 10/28/2024 08:24 AM    BUN 7 10/28/2024 08:24 AM    CREATININE 0.56 10/28/2024 08:24 AM    GFRAA 109 10/19/2020 09:27 AM    AGRATIO 0.8 09/03/2019 08:16 AM    LABGLOM >90 10/28/2024 08:24 AM    LABGLOM >60 03/09/2023 02:45 PM    GLUCOSE 103 10/28/2024 08:24 AM    CALCIUM 8.8 10/28/2024 08:24 AM    BILITOT 0.6 10/28/2024 08:24 AM    ALKPHOS 80 10/28/2024 08:24 AM    ALKPHOS 114 09/03/2019 08:16 AM    AST 17 10/28/2024 08:24 AM    ALT 14 10/28/2024 08:24 AM       Allergies   Allergen Reactions    Milk Protein Diarrhea    Sulfa Antibiotics Nausea Only    Adhesive Tape Other (See Comments) and Rash     Skin tears    Other/Food Other (See Comments) and Rash     TAPE,  USE PAPER TAPE--Skin tears     Past Medical History:   Diagnosis Date    Acute, but ill-defined, cerebrovascular disease 4/20/2015    Adjustment disorder with depressed mood     Allergic rhinitis, cause unspecified 4/21/2015    Anxiety and depression     Celexa    Aortic stenosis     8/20/19 Echo- AV stenosis-  LVEF 55-60%    Disorder of bone and cartilage, unspecified 4/21/2015    Former

## 2025-07-29 ENCOUNTER — OFFICE VISIT (OUTPATIENT)
Age: 75
End: 2025-07-29
Payer: MEDICARE

## 2025-07-29 VITALS
HEIGHT: 59 IN | WEIGHT: 157 LBS | BODY MASS INDEX: 31.65 KG/M2 | SYSTOLIC BLOOD PRESSURE: 110 MMHG | DIASTOLIC BLOOD PRESSURE: 78 MMHG | HEART RATE: 64 BPM

## 2025-07-29 DIAGNOSIS — I73.9 CLAUDICATION: Primary | ICD-10-CM

## 2025-07-29 DIAGNOSIS — Z95.2 S/P AVR (AORTIC VALVE REPLACEMENT): ICD-10-CM

## 2025-07-29 DIAGNOSIS — I25.10 CORONARY ARTERY DISEASE DUE TO LIPID RICH PLAQUE: ICD-10-CM

## 2025-07-29 DIAGNOSIS — I10 ESSENTIAL HYPERTENSION: ICD-10-CM

## 2025-07-29 DIAGNOSIS — I25.83 CORONARY ARTERY DISEASE DUE TO LIPID RICH PLAQUE: ICD-10-CM

## 2025-07-29 DIAGNOSIS — E78.5 DYSLIPIDEMIA: ICD-10-CM

## 2025-07-29 PROCEDURE — G8428 CUR MEDS NOT DOCUMENT: HCPCS | Performed by: INTERNAL MEDICINE

## 2025-07-29 PROCEDURE — 1123F ACP DISCUSS/DSCN MKR DOCD: CPT | Performed by: INTERNAL MEDICINE

## 2025-07-29 PROCEDURE — 1126F AMNT PAIN NOTED NONE PRSNT: CPT | Performed by: INTERNAL MEDICINE

## 2025-07-29 PROCEDURE — 3078F DIAST BP <80 MM HG: CPT | Performed by: INTERNAL MEDICINE

## 2025-07-29 PROCEDURE — 3017F COLORECTAL CA SCREEN DOC REV: CPT | Performed by: INTERNAL MEDICINE

## 2025-07-29 PROCEDURE — G8417 CALC BMI ABV UP PARAM F/U: HCPCS | Performed by: INTERNAL MEDICINE

## 2025-07-29 PROCEDURE — 3074F SYST BP LT 130 MM HG: CPT | Performed by: INTERNAL MEDICINE

## 2025-07-29 PROCEDURE — 99215 OFFICE O/P EST HI 40 MIN: CPT | Performed by: INTERNAL MEDICINE

## 2025-07-29 PROCEDURE — 1036F TOBACCO NON-USER: CPT | Performed by: INTERNAL MEDICINE

## 2025-07-29 PROCEDURE — G8399 PT W/DXA RESULTS DOCUMENT: HCPCS | Performed by: INTERNAL MEDICINE

## 2025-07-29 PROCEDURE — 1090F PRES/ABSN URINE INCON ASSESS: CPT | Performed by: INTERNAL MEDICINE

## 2025-07-29 RX ORDER — EZETIMIBE 10 MG/1
10 TABLET ORAL DAILY
Qty: 90 TABLET | Refills: 3 | Status: SHIPPED | OUTPATIENT
Start: 2025-07-29

## 2025-07-29 NOTE — PROGRESS NOTES
[facility] CARDIOLOGY, [address], [address]  [place] [address]  PHONE: [phone]    SUBJECTIVE: /HPI  Mandy Huffman (1950) is a 74 y.o. female seen for a follow up visit regarding the following:   Specialty Problems          Cardiology Problems    Coronary atherosclerosis of native coronary vessel        Essential hypertension, benign        Migraine         Patient presents for follow-up. She states that she is feeling. Denies chest pain, shortness of breath, palpitations, syncope, pre-syncope.    Bilateral lower extremity vascular study 2/2025 revealed normal YOBANY with no stenosis or occlusion.    Lipid panel 5/2025 elevated trig, mildly elevated LDL. Compliant on Pravastatin.    Denies snoring, denies daytime fatigue, denies waking up at night. Low suspicion for DARRELL.    Past Medical History, Past Surgical History, Family history, Social History, and Medications were all reviewed with the patient today and updated as necessary.    Allergies   Allergen Reactions    Milk Protein Diarrhea    Sulfa Antibiotics Nausea Only    Adhesive Tape Other (See Comments) and Rash     Skin tears    Other Other (See Comments) and Rash     TAPE,  USE PAPER TAPE--Skin tears     Past Medical History:   Diagnosis Date    Acute, but ill-defined, cerebrovascular disease 4/20/2015    Adjustment disorder with depressed mood     Allergic rhinitis, cause unspecified 4/21/2015    Anxiety and depression     Celexa    Aortic stenosis     8/20/19 Echo- AV stenosis-  LVEF 55-60%    Disorder of bone and cartilage, unspecified 4/21/2015    Former smoker, stopped smoking in distant past     Quit 1998---0.5 ppd x 15 years    GERD (gastroesophageal reflux disease)     valve does not open and close unless laying on Left side and HOB elevated 4\" & 2 pillows, no medications    H/O heart artery stent 2009    X1    Herpes zoster without mention of complication     History of stroke at age 35    on correlation to migraines

## (undated) DEVICE — SUTURE S STL SZ 6 L18IN NONABSORBABLE SIL L48MM CCS 1/2 CIR M654G

## (undated) DEVICE — Device

## (undated) DEVICE — MEDI-TRACE CADENCE ADULT, DEFIBRILLATION ELECTRODE -RTS  (10 PR/PK) - ZOLL: Brand: MEDI-TRACE CADENCE

## (undated) DEVICE — 6 FOOT DISPOSABLE EXTENSION CABLE WITH SAFE CONNECT / SCREW-DOWN

## (undated) DEVICE — SUTURE VCRL SZ 3-0 L36IN ABSRB UD L36MM CT-1 1/2 CIR J944H

## (undated) DEVICE — SUT PROL 3-0 36IN SH DA BLU --

## (undated) DEVICE — INTENDED USED TO PROTECT, TAG AND HELP LOCATED SUTURES DURING SURGERY: Brand: STERION®SUTURE AID BOOTIES

## (undated) DEVICE — INTENT TO BE USED WITH SUTURE MATERIAL FOR TISSUE CLOSURE: Brand: RICHARD-ALLAN® NEEDLE 1/2 CIRCLE TAPER

## (undated) DEVICE — 3000CC GUARDIAN II: Brand: GUARDIAN

## (undated) DEVICE — (D)PREP SKN CHLRAPRP APPL 26ML -- CONVERT TO ITEM 371833

## (undated) DEVICE — 48" PROBE COVER W/GEL, ULTRASOUND, STERILE: Brand: SITE-RITE

## (undated) DEVICE — CLAMP INSERT: Brand: STEALTH® CLAMP INSERT

## (undated) DEVICE — AMD ANTIMICROBIAL GAUZE SPONGES,12 PLY USP TYPE VII, 0.2% POLYHEXAMETHYLENE BIGUANIDE HCI (PHMB): Brand: CURITY

## (undated) DEVICE — SUT PROL 4-0 30IN SH1 DA BLU --

## (undated) DEVICE — CATH URETH INTMIT ROB 14FR FUN -- USE ITEM 179521

## (undated) DEVICE — SUT CHRMC 4-0 27IN SH BRN --

## (undated) DEVICE — COR-KNOT® QUICK LOAD® 6-POUCH: Brand: COR-KNOT® QUICK LOAD®

## (undated) DEVICE — BUTTON SWITCH PENCIL BLADE ELECTRODE, HOLSTER: Brand: EDGE

## (undated) DEVICE — SUTURE ETHBND EXCEL SZ 0 L18IN NONABSORBABLE GRN L36MM CT-1 CX21D

## (undated) DEVICE — DRAPE SLUSH DISC W44XL66IN ST FOR RND BSIN HUSH SLUSH SYS

## (undated) DEVICE — SUTURE ETHBND EXCEL 2-0 L30IN NONABSORBABLE GRN L26MM SH MX563

## (undated) DEVICE — (D)STRIP SKN CLSR 0.5X4IN WHT --

## (undated) DEVICE — COR-KNOT MINI® COMBO KITBASE PACKAGE TYPE - KITEACH STERILE PACKAGE KIT CONTAINS (2) SINGLE PATIENT USE COR-KNOT MINI® DEVICES AND (12) COR-KNOT® QUICK LOADS®.: Brand: COR-KNOT MINI®

## (undated) DEVICE — REM POLYHESIVE ADULT PATIENT RETURN ELECTRODE: Brand: VALLEYLAB

## (undated) DEVICE — SS SUTURE, 3 PER SLEEVE: Brand: MYO/WIRE II

## (undated) DEVICE — SUTURE TEMP PACE WIRE SZ 2-0 L24IN NONABSORBABLE LIGHT/DARK

## (undated) DEVICE — SURGIFOAM SPNG SZ 100

## (undated) DEVICE — SOLUTION IRRIG 3000ML 0.9% SOD CHL FLX CONT 0797208] ICU MEDICAL INC]

## (undated) DEVICE — CATHETER THOR 32FR L23IN PVC 6 EYELET STR ATRAUM

## (undated) DEVICE — SUTURE VCRL SZ 4-0 L27IN ABSRB UD L19MM PS-2 3/8 CIR PRIM J426H

## (undated) DEVICE — BLADE SAW W10XL54MM FOR PRI REPEAT STRNOTMY

## (undated) DEVICE — SUTURE SILK PERMAHAND PRECUT 6 X 30 IN SZ 1 BLK BRAID A307H

## (undated) DEVICE — CATHETER ETER TY TEMP SENS F MBO W URIN M FOLLOWS CDC GUIDELINES TO

## (undated) DEVICE — SOLUTION IV 1000ML 0.9% SOD CHL

## (undated) DEVICE — GLOVE SURG SZ 7 L11.2IN THK9.8MIL STRW LTX POLYMER BEAD CUF

## (undated) DEVICE — SUTURE PDS II SZ 1 L36IN ABSRB VLT L48MM CTX 1/2 CIR Z371T

## (undated) DEVICE — PLEDGET VASC W3/16XL3/8IN THK1/16IN PTFE SFT